# Patient Record
Sex: MALE | Employment: OTHER | URBAN - METROPOLITAN AREA
[De-identification: names, ages, dates, MRNs, and addresses within clinical notes are randomized per-mention and may not be internally consistent; named-entity substitution may affect disease eponyms.]

---

## 2017-01-04 ENCOUNTER — APPOINTMENT (OUTPATIENT)
Dept: LAB | Age: 82
End: 2017-01-04
Attending: INTERNAL MEDICINE
Payer: MEDICARE

## 2017-01-04 ENCOUNTER — OFFICE VISIT (OUTPATIENT)
Dept: NEPHROLOGY | Facility: CLINIC | Age: 82
End: 2017-01-04

## 2017-01-04 VITALS — BODY MASS INDEX: 35 KG/M2 | DIASTOLIC BLOOD PRESSURE: 72 MMHG | SYSTOLIC BLOOD PRESSURE: 144 MMHG | WEIGHT: 230 LBS

## 2017-01-04 DIAGNOSIS — N18.5 CKD (CHRONIC KIDNEY DISEASE), STAGE 5: ICD-10-CM

## 2017-01-04 DIAGNOSIS — N18.5 CKD (CHRONIC KIDNEY DISEASE), STAGE 5: Primary | ICD-10-CM

## 2017-01-04 LAB — HBV SURFACE AG SERPL QL IA: NONREACTIVE

## 2017-01-04 PROCEDURE — 36415 COLL VENOUS BLD VENIPUNCTURE: CPT

## 2017-01-04 PROCEDURE — 87340 HEPATITIS B SURFACE AG IA: CPT

## 2017-01-04 PROCEDURE — 99214 OFFICE O/P EST MOD 30 MIN: CPT | Performed by: INTERNAL MEDICINE

## 2017-01-04 RX ORDER — ACETAMINOPHEN 325 MG/1
650 TABLET ORAL EVERY 6 HOURS PRN
COMMUNITY
End: 2019-07-31 | Stop reason: ALTCHOICE

## 2017-01-04 NOTE — PROGRESS NOTES
Nephrology Progress Note      Sheridan Centeno is a 80year old male.     HPI:   Patient presents with:  Chronic Kidney Disease  Hypertension      Mr. Tracey Eason was seen in the nephrology clinic today in follow-up for management of progressive chronic kidne SUBSTANCE, EPIDURAL/SUBARACHNOID; LUMBAR/SACRAL N/A 4/17/2015    Comment Procedure: CAUDAL;  Surgeon: Alex Licea MD;  Location: [address] [place] [address] BY [facility] [address] [facility] 59  [facility] 5+ YR N/A 4/17/2015    Comment Procedure: CAUDAL;  Surge Medications (Active prior to today's visit):    Current Outpatient Prescriptions:  HYDROcodone-acetaminophen 5-325 MG Oral Tab Take 1 tablet by mouth every 4 (four) hours as needed for Pain.  Disp: 30 tablet Rfl: 0   TRAMADOL HCL 50 MG Oral Tab TAKE ONE scleral icterus, MMM  Neck: Supple, no SAY or thyromegaly  Cardiac: Regular rate and rhythm, S1, S2 normal, no murmur or rub  Lungs: Clear without wheezes, rales, rhonchi.     Abdomen: Soft, non-tender. + bowel sounds, no palpable organomegaly  Extremities: At this point he has developed symptoms related to worsening kidney function and would appear to have symptomatic uremia.   Although he is not in need of dialysis acutely at this time we had a long discussion regarding the fact that he should initiate dial

## 2017-01-11 ENCOUNTER — TELEPHONE (OUTPATIENT)
Dept: NEPHROLOGY | Facility: CLINIC | Age: 82
End: 2017-01-11

## 2017-01-15 ENCOUNTER — ANESTHESIA EVENT (OUTPATIENT)
Dept: CARDIAC SURGERY | Facility: HOSPITAL | Age: 82
End: 2017-01-15
Payer: MEDICARE

## 2017-01-16 ENCOUNTER — ANESTHESIA (OUTPATIENT)
Dept: CARDIAC SURGERY | Facility: HOSPITAL | Age: 82
End: 2017-01-16
Payer: MEDICARE

## 2017-01-16 NOTE — ANESTHESIA PREPROCEDURE EVALUATION
PRE-OP EVALUATION    Patient Name: Perla Calvert    Pre-op Diagnosis: end stage renal disease    Procedure(s):  revision of left arm AV fistula  no SA per surgeon    Surgeon(s) and Role:     Mary Alice Christensen MD - Primary    Pre-op vitals reviewed. reviewed.           (+) obesity  (+) hypertension   (+) hyperlipidemia                                  Endo/Other      (+) diabetes  type 2,                          Pulmonary                           Neuro/Psych                 Neuromuscular disease: lum EVENTS N/A 4/17/2015    Comment Procedure: CAUDAL;  Surgeon: Sathya Barnes MD;  Location: [facility] FOR PAIN MANAGEMENT    INJECTION, W/WO CONTRAST, DX/THERAPEUTIC SUBSTANCE, EPIDURAL/SUBARACHNOID; LUMBAR/SACRAL N/A 5/5/2015    Comment Procedure: CAUD Results  Component Value Date   INR 1.0 01/11/2017         Airway      Mallampati: II  Mouth opening: >3 FB  TM distance: 4 - 6 cm  Neck ROM: full Cardiovascular    Cardiovascular exam normal.         Dental      Dental appliance(s): upper dentures       P

## 2017-01-23 ENCOUNTER — SURGERY (OUTPATIENT)
Age: 82
End: 2017-01-23

## 2017-01-23 ENCOUNTER — HOSPITAL ENCOUNTER (OUTPATIENT)
Facility: HOSPITAL | Age: 82
Setting detail: OBSERVATION
LOS: 1 days | Discharge: HOME | End: 2017-01-23
Attending: SURGERY | Admitting: SURGERY
Payer: MEDICARE

## 2017-01-23 ENCOUNTER — APPOINTMENT (OUTPATIENT)
Dept: GENERAL RADIOLOGY | Facility: HOSPITAL | Age: 82
End: 2017-01-23
Attending: SURGERY
Payer: MEDICARE

## 2017-01-23 VITALS
RESPIRATION RATE: 16 BRPM | DIASTOLIC BLOOD PRESSURE: 65 MMHG | OXYGEN SATURATION: 96 % | HEART RATE: 68 BPM | SYSTOLIC BLOOD PRESSURE: 139 MMHG | WEIGHT: 217.19 LBS | TEMPERATURE: 98 F | HEIGHT: 68 IN | BODY MASS INDEX: 32.92 KG/M2

## 2017-01-23 LAB
ATRIAL RATE: 72 BPM
BUN BLD-MCNC: 44 MG/DL (ref 8–20)
CALCIUM BLD-MCNC: 8.1 MG/DL (ref 8.3–10.3)
CHLORIDE: 113 MMOL/L (ref 101–111)
CO2: 21 MMOL/L (ref 22–32)
CREAT BLD-MCNC: 5.51 MG/DL (ref 0.7–1.3)
GLUCOSE BLD-MCNC: 92 MG/DL (ref 70–99)
GLUCOSE BLD-MCNC: 95 MG/DL (ref 65–99)
P AXIS: 45 DEGREES
P-R INTERVAL: 172 MS
POTASSIUM SERPL-SCNC: 4.3 MMOL/L (ref 3.6–5.1)
Q-T INTERVAL: 438 MS
QRS DURATION: 138 MS
QTC CALCULATION (BEZET): 479 MS
R AXIS: 16 DEGREES
SODIUM SERPL-SCNC: 143 MMOL/L (ref 136–144)
T AXIS: 24 DEGREES
VENTRICULAR RATE: 72 BPM

## 2017-01-23 PROCEDURE — 71010 XR CHEST AP PORTABLE  (CPT=71010): CPT

## 2017-01-23 PROCEDURE — 05WY07Z REVISION OF AUTOLOGOUS TISSUE SUBSTITUTE IN UPPER VEIN, OPEN APPROACH: ICD-10-PCS | Performed by: SURGERY

## 2017-01-23 PROCEDURE — 80048 BASIC METABOLIC PNL TOTAL CA: CPT | Performed by: SURGERY

## 2017-01-23 PROCEDURE — 93010 ELECTROCARDIOGRAM REPORT: CPT | Performed by: INTERNAL MEDICINE

## 2017-01-23 PROCEDURE — 93005 ELECTROCARDIOGRAM TRACING: CPT

## 2017-01-23 PROCEDURE — 82962 GLUCOSE BLOOD TEST: CPT

## 2017-01-23 RX ORDER — NALOXONE HYDROCHLORIDE 0.4 MG/ML
80 INJECTION, SOLUTION INTRAMUSCULAR; INTRAVENOUS; SUBCUTANEOUS AS NEEDED
Status: DISCONTINUED | OUTPATIENT
Start: 2017-01-23 | End: 2017-01-23

## 2017-01-23 RX ORDER — LABETALOL HYDROCHLORIDE 5 MG/ML
5 INJECTION, SOLUTION INTRAVENOUS EVERY 5 MIN PRN
Status: DISCONTINUED | OUTPATIENT
Start: 2017-01-23 | End: 2017-01-23

## 2017-01-23 RX ORDER — HYDROCODONE BITARTRATE AND ACETAMINOPHEN 5; 325 MG/1; MG/1
1 TABLET ORAL EVERY 4 HOURS PRN
Qty: 30 TABLET | Refills: 0 | Status: SHIPPED | OUTPATIENT
Start: 2017-01-23 | End: 2017-04-05

## 2017-01-23 RX ORDER — HYDROCODONE BITARTRATE AND ACETAMINOPHEN 5; 325 MG/1; MG/1
2 TABLET ORAL AS NEEDED
Status: DISCONTINUED | OUTPATIENT
Start: 2017-01-23 | End: 2017-01-23

## 2017-01-23 RX ORDER — HYDROCODONE BITARTRATE AND ACETAMINOPHEN 5; 325 MG/1; MG/1
1 TABLET ORAL EVERY 4 HOURS PRN
Status: DISCONTINUED | OUTPATIENT
Start: 2017-01-23 | End: 2017-01-23

## 2017-01-23 RX ORDER — SODIUM CHLORIDE, SODIUM LACTATE, POTASSIUM CHLORIDE, CALCIUM CHLORIDE 600; 310; 30; 20 MG/100ML; MG/100ML; MG/100ML; MG/100ML
INJECTION, SOLUTION INTRAVENOUS CONTINUOUS
Status: DISCONTINUED | OUTPATIENT
Start: 2017-01-23 | End: 2017-01-23

## 2017-01-23 RX ORDER — HYDROMORPHONE HYDROCHLORIDE 1 MG/ML
0.4 INJECTION, SOLUTION INTRAMUSCULAR; INTRAVENOUS; SUBCUTANEOUS EVERY 5 MIN PRN
Status: DISCONTINUED | OUTPATIENT
Start: 2017-01-23 | End: 2017-01-23

## 2017-01-23 RX ORDER — CEFAZOLIN SODIUM 1 G/3ML
INJECTION, POWDER, FOR SOLUTION INTRAMUSCULAR; INTRAVENOUS
Status: DISCONTINUED | OUTPATIENT
Start: 2017-01-23 | End: 2017-01-23

## 2017-01-23 RX ORDER — DEXTROSE MONOHYDRATE 25 G/50ML
50 INJECTION, SOLUTION INTRAVENOUS
Status: DISCONTINUED | OUTPATIENT
Start: 2017-01-23 | End: 2017-01-23

## 2017-01-23 RX ORDER — BUPIVACAINE HYDROCHLORIDE 5 MG/ML
INJECTION, SOLUTION PERINEURAL AS NEEDED
Status: DISCONTINUED | OUTPATIENT
Start: 2017-01-23 | End: 2017-01-23 | Stop reason: HOSPADM

## 2017-01-23 RX ORDER — HYDROCODONE BITARTRATE AND ACETAMINOPHEN 5; 325 MG/1; MG/1
2 TABLET ORAL EVERY 4 HOURS PRN
Status: DISCONTINUED | OUTPATIENT
Start: 2017-01-23 | End: 2017-01-23

## 2017-01-23 RX ORDER — HYDROCODONE BITARTRATE AND ACETAMINOPHEN 5; 325 MG/1; MG/1
1 TABLET ORAL AS NEEDED
Status: DISCONTINUED | OUTPATIENT
Start: 2017-01-23 | End: 2017-01-23

## 2017-01-23 NOTE — ANESTHESIA POSTPROCEDURE EVALUATION
[facility] Patient Status:  Observation   Age/Gender 80year old male MRN [id number]   Location [address] Attending Nick Lange MD   Hosp Day # 0 PCP Sam Chan MD       Anesthesia Post-op Not

## 2017-01-23 NOTE — OPERATIVE REPORT
[facility] POST ANESTHESIA CARE UNIT  Brief Op Note     Orlean Edge Location: OR   [facility] [id number] MRN [id number]   Admission Date 1/23/2017 Operation Date 1/23/2017   Attending Physician Filomena Gonzalez MD Operating Physician Sergio De Oliveira MD

## 2017-01-23 NOTE — BRIEF OP NOTE
[address] [place] POST ANESTHESIA CARE UNIT  Brief Op Note     Cindi Payal Location: OR   [facility] [id number] MRN [id number]   Admission Date 1/23/2017 Operation Date 1/23/2017   Attending Physician Otilio Meigs, MD Operating Physician Mark Tai MD

## 2017-01-23 NOTE — PROGRESS NOTES
[facility]  Vascular Surgery    Junior Thomas Patient Status:  Inpatient    1935 MRN [id number]   [facility] Attending Javan Rojas MD   [facility] Day # 0 PCP Kaushik Romo MD         History of Present Il Stella Sibley MD;  Location: [facility] FOR PAIN MANAGEMENT    PATIENT [address] FOR IV ANTIBIOTIC SURGICAL SITE INFECTION PROPHYLAXIS.  N/A 4/17/2015    Comment Procedure: CAUDAL;  Surgeon: Stella Sibley MD;  Location: [address] [place] [address] facility-administered medications for this encounter.     Review of Systems:    CONSTITUTIONAL: denies fever, chills  ENT: denies sore throat, nasal drainage/congestion  CHEST/CVS: denies cough, sputum, trouble breathing/SOB, chest pain, CORBIN  GI: denies abd MD  1/23/2017  6:49 AM

## 2017-02-08 ENCOUNTER — TELEPHONE (OUTPATIENT)
Dept: NEPHROLOGY | Facility: CLINIC | Age: 82
End: 2017-02-08

## 2017-02-08 NOTE — TELEPHONE ENCOUNTER
I spoke with pt today, AVF ready to use. C/o ongoing wt loss/lack of appetite/fatigue. Plan to initiate HD at [address].   Paperwork is being done

## 2017-03-20 RX ORDER — AMLODIPINE BESYLATE 10 MG/1
TABLET ORAL
Qty: 30 TABLET | Refills: 5 | OUTPATIENT
Start: 2017-03-20

## 2017-03-27 ENCOUNTER — ANESTHESIA EVENT (OUTPATIENT)
Dept: CARDIAC SURGERY | Facility: HOSPITAL | Age: 82
End: 2017-03-27
Payer: MEDICARE

## 2017-03-27 ENCOUNTER — HOSPITAL ENCOUNTER (OUTPATIENT)
Facility: HOSPITAL | Age: 82
Setting detail: HOSPITAL OUTPATIENT SURGERY
Discharge: HOME | End: 2017-03-27
Attending: SURGERY | Admitting: SURGERY
Payer: MEDICARE

## 2017-03-27 ENCOUNTER — SURGERY (OUTPATIENT)
Age: 82
End: 2017-03-27

## 2017-03-27 ENCOUNTER — ANESTHESIA (OUTPATIENT)
Dept: CARDIAC SURGERY | Facility: HOSPITAL | Age: 82
End: 2017-03-27
Payer: MEDICARE

## 2017-03-27 VITALS
OXYGEN SATURATION: 99 % | HEART RATE: 59 BPM | TEMPERATURE: 97 F | DIASTOLIC BLOOD PRESSURE: 66 MMHG | BODY MASS INDEX: 31.57 KG/M2 | RESPIRATION RATE: 14 BRPM | SYSTOLIC BLOOD PRESSURE: 136 MMHG | WEIGHT: 208.31 LBS | HEIGHT: 68 IN

## 2017-03-27 LAB
ALBUMIN SERPL-MCNC: 2.6 G/DL (ref 3.5–4.8)
ALP LIVER SERPL-CCNC: 94 U/L (ref 45–117)
ALT SERPL-CCNC: 11 U/L (ref 17–63)
AST SERPL-CCNC: 15 U/L (ref 15–41)
ATRIAL RATE: 68 BPM
BASOPHILS # BLD AUTO: 0.04 X10(3) UL (ref 0–0.1)
BASOPHILS NFR BLD AUTO: 0.5 %
BILIRUB SERPL-MCNC: 0.4 MG/DL (ref 0.1–2)
BUN BLD-MCNC: 30 MG/DL (ref 8–20)
CALCIUM BLD-MCNC: 8.5 MG/DL (ref 8.3–10.3)
CHLORIDE: 105 MMOL/L (ref 101–111)
CO2: 30 MMOL/L (ref 22–32)
CREAT BLD-MCNC: 4.99 MG/DL (ref 0.7–1.3)
EOSINOPHIL # BLD AUTO: 0.17 X10(3) UL (ref 0–0.3)
EOSINOPHIL NFR BLD AUTO: 2.1 %
ERYTHROCYTE [DISTWIDTH] IN BLOOD BY AUTOMATED COUNT: 14.9 % (ref 11.5–16)
GLUCOSE BLD-MCNC: 101 MG/DL (ref 70–99)
GLUCOSE BLD-MCNC: 93 MG/DL (ref 65–99)
GLUCOSE BLD-MCNC: 98 MG/DL (ref 65–99)
HCT VFR BLD AUTO: 34.9 % (ref 37–53)
HGB BLD-MCNC: 11.1 G/DL (ref 13–17)
IMMATURE GRANULOCYTE COUNT: 0.03 X10(3) UL (ref 0–1)
IMMATURE GRANULOCYTE RATIO %: 0.4 %
LYMPHOCYTES # BLD AUTO: 1.84 X10(3) UL (ref 0.9–4)
LYMPHOCYTES NFR BLD AUTO: 22.7 %
M PROTEIN MFR SERPL ELPH: 5.9 G/DL (ref 6.1–8.3)
MCH RBC QN AUTO: 33.2 PG (ref 27–33.2)
MCHC RBC AUTO-ENTMCNC: 31.8 G/DL (ref 31–37)
MCV RBC AUTO: 104.5 FL (ref 80–99)
MONOCYTES # BLD AUTO: 0.63 X10(3) UL (ref 0.1–0.6)
MONOCYTES NFR BLD AUTO: 7.8 %
NEUTROPHIL ABS PRELIM: 5.4 X10 (3) UL (ref 1.3–6.7)
NEUTROPHILS # BLD AUTO: 5.4 X10(3) UL (ref 1.3–6.7)
NEUTROPHILS NFR BLD AUTO: 66.5 %
P AXIS: 49 DEGREES
P-R INTERVAL: 170 MS
PLATELET # BLD AUTO: 177 10(3)UL (ref 150–450)
POTASSIUM SERPL-SCNC: 4.8 MMOL/L (ref 3.6–5.1)
Q-T INTERVAL: 458 MS
QRS DURATION: 132 MS
QTC CALCULATION (BEZET): 487 MS
R AXIS: 7 DEGREES
RBC # BLD AUTO: 3.34 X10(6)UL (ref 3.8–5.8)
RED CELL DISTRIBUTION WIDTH-SD: 57.1 FL (ref 35.1–46.3)
SODIUM SERPL-SCNC: 142 MMOL/L (ref 136–144)
T AXIS: 14 DEGREES
VENTRICULAR RATE: 68 BPM
WBC # BLD AUTO: 8.1 X10(3) UL (ref 4–13)

## 2017-03-27 PROCEDURE — 05WY07Z REVISION OF AUTOLOGOUS TISSUE SUBSTITUTE IN UPPER VEIN, OPEN APPROACH: ICD-10-PCS | Performed by: SURGERY

## 2017-03-27 PROCEDURE — 80053 COMPREHEN METABOLIC PANEL: CPT | Performed by: SURGERY

## 2017-03-27 PROCEDURE — 82962 GLUCOSE BLOOD TEST: CPT

## 2017-03-27 PROCEDURE — 93010 ELECTROCARDIOGRAM REPORT: CPT | Performed by: INTERNAL MEDICINE

## 2017-03-27 PROCEDURE — 85025 COMPLETE CBC W/AUTO DIFF WBC: CPT | Performed by: SURGERY

## 2017-03-27 PROCEDURE — 93005 ELECTROCARDIOGRAM TRACING: CPT

## 2017-03-27 RX ORDER — DEXTROSE MONOHYDRATE 25 G/50ML
50 INJECTION, SOLUTION INTRAVENOUS
Status: DISCONTINUED | OUTPATIENT
Start: 2017-03-27 | End: 2017-03-27

## 2017-03-27 RX ORDER — HYDROCODONE BITARTRATE AND ACETAMINOPHEN 5; 325 MG/1; MG/1
1 TABLET ORAL EVERY 4 HOURS PRN
Qty: 30 TABLET | Refills: 0 | Status: SHIPPED | OUTPATIENT
Start: 2017-03-27 | End: 2017-04-05

## 2017-03-27 RX ORDER — HYDROMORPHONE HYDROCHLORIDE 1 MG/ML
INJECTION, SOLUTION INTRAMUSCULAR; INTRAVENOUS; SUBCUTANEOUS
Status: COMPLETED
Start: 2017-03-27 | End: 2017-03-27

## 2017-03-27 RX ORDER — HYDROCODONE BITARTRATE AND ACETAMINOPHEN 5; 325 MG/1; MG/1
1 TABLET ORAL AS NEEDED
Status: DISCONTINUED | OUTPATIENT
Start: 2017-03-27 | End: 2017-03-27

## 2017-03-27 RX ORDER — HYDROCODONE BITARTRATE AND ACETAMINOPHEN 5; 325 MG/1; MG/1
2 TABLET ORAL AS NEEDED
Status: DISCONTINUED | OUTPATIENT
Start: 2017-03-27 | End: 2017-03-27

## 2017-03-27 RX ORDER — NALOXONE HYDROCHLORIDE 0.4 MG/ML
80 INJECTION, SOLUTION INTRAMUSCULAR; INTRAVENOUS; SUBCUTANEOUS AS NEEDED
Status: DISCONTINUED | OUTPATIENT
Start: 2017-03-27 | End: 2017-03-27

## 2017-03-27 RX ORDER — SODIUM CHLORIDE, SODIUM LACTATE, POTASSIUM CHLORIDE, CALCIUM CHLORIDE 600; 310; 30; 20 MG/100ML; MG/100ML; MG/100ML; MG/100ML
INJECTION, SOLUTION INTRAVENOUS CONTINUOUS
Status: DISCONTINUED | OUTPATIENT
Start: 2017-03-27 | End: 2017-03-27

## 2017-03-27 RX ORDER — ONDANSETRON 2 MG/ML
4 INJECTION INTRAMUSCULAR; INTRAVENOUS AS NEEDED
Status: DISCONTINUED | OUTPATIENT
Start: 2017-03-27 | End: 2017-03-27

## 2017-03-27 RX ORDER — MEPERIDINE HYDROCHLORIDE 25 MG/ML
12.5 INJECTION INTRAMUSCULAR; INTRAVENOUS; SUBCUTANEOUS AS NEEDED
Status: DISCONTINUED | OUTPATIENT
Start: 2017-03-27 | End: 2017-03-27

## 2017-03-27 RX ORDER — MIDAZOLAM HYDROCHLORIDE 1 MG/ML
1 INJECTION INTRAMUSCULAR; INTRAVENOUS EVERY 5 MIN PRN
Status: DISCONTINUED | OUTPATIENT
Start: 2017-03-27 | End: 2017-03-27

## 2017-03-27 RX ORDER — CLINDAMYCIN PHOSPHATE 900 MG/50ML
INJECTION INTRAVENOUS
Status: DISCONTINUED | OUTPATIENT
Start: 2017-03-27 | End: 2017-03-27

## 2017-03-27 RX ORDER — BUPIVACAINE HYDROCHLORIDE 5 MG/ML
INJECTION, SOLUTION PERINEURAL AS NEEDED
Status: DISCONTINUED | OUTPATIENT
Start: 2017-03-27 | End: 2017-03-27 | Stop reason: HOSPADM

## 2017-03-27 RX ORDER — HYDROMORPHONE HYDROCHLORIDE 1 MG/ML
0.4 INJECTION, SOLUTION INTRAMUSCULAR; INTRAVENOUS; SUBCUTANEOUS EVERY 5 MIN PRN
Status: DISCONTINUED | OUTPATIENT
Start: 2017-03-27 | End: 2017-03-27

## 2017-03-27 NOTE — ANESTHESIA PREPROCEDURE EVALUATION
PRE-OP EVALUATION    Patient Name: Davidson Sandra    Pre-op Diagnosis: end stage renal     Procedure(s):  revision of left arteriovenous fistula   NO SA PER DR BANDA    Surgeon(s) and Role:     Jed Crow MD - Primary    Pre-op vitals reviewed. noted on gated SPECT analysis. - Normal ejection fraction. Prepared and signed by  Amita Centeno M.D.  11/09/2016 15:30      ECG reviewed.           (+) obesity  (+) hypertension   (+) hyperlipidemia                                  Endo/Other      (+) d Lior Jones MD;  Location: [facility] FOR PAIN MANAGEMENT    PATIENT DOCUMENTED NOT TO HAVE EXPERIENCED ANY OF THE FOLLOWING EVENTS N/A 4/17/2015    Comment Procedure: CAUDAL;  Surgeon: Lior Jones MD;  Location: [address] [place] [address] Results  Component Value Date    03/27/2017    01/11/2017   K 4.8 03/27/2017   K 5.1 01/11/2017    03/27/2017    01/11/2017   CO2 30.0 03/27/2017   CO2 22.0 01/11/2017   BUN 30* 03/27/2017   BUN 38* 01/11/2017   CREATSERUM 4.99* 03/

## 2017-03-27 NOTE — OPERATIVE REPORT
[facility] POST ANESTHESIA CARE UNIT  Brief Op Note     Tamisonia Malodnado Location: OR   [facility] [id number] MRN [id number]   Admission Date 3/27/2017 Operation Date 3/27/2017   Attending Physician Emily Antoine MD Operating Physician Emma Telles MD was Doppler interrogated to confirm no significant change. Vein branch was ligated. This was repeated a third time at the level of the deltopectoral groove. All incisions were then irrigated and closed with interrupted Vicryl and Monocryl.   Areas for Lg Das

## 2017-03-27 NOTE — H&P
Patient seen and marked for revision of fistula. Left arm with branches diverting flow.  I have discussed with the patient and/or legal representative the potential benefits, risks, and side effects of the procedure; the likelihood of the patient achieving

## 2017-03-27 NOTE — BRIEF OP NOTE
[address] [place] POST ANESTHESIA CARE UNIT  Brief Op Note     Terris Ban Location: OR   [facility] [id number] MRN [id number]   Admission Date 3/27/2017 Operation Date 3/27/2017   Attending Physician Sreekanth Gilmore MD Operating Physician Miriam Carter MD

## 2017-03-27 NOTE — ANESTHESIA POSTPROCEDURE EVALUATION
[facility] [place] Patient Status:  Inpatient   Age/Gender 80year old male MRN [id number]   Location [address] [facility] Attending Nina Villanueva MD   [facility] Day # 0 PCP Pascual Reno MD       Anesthesia Post-op Note

## 2017-03-29 NOTE — PAYOR COMM NOTE
Review Type: ADMISSION   Reviewer: Tamiko Laguerre       Date: March 29, 2017 - 10:47 AM  Payor: [address] [place] [address] Number: [id number]  Admit date: 3/27/2017  5:24 AM   Admitted from Emergency Dept.:     REVIEWER COMMENTS    Shyanne Newman

## 2017-04-05 PROBLEM — I15.0 RENOVASCULAR HYPERTENSION: Status: ACTIVE | Noted: 2017-04-05

## 2017-05-17 PROBLEM — E11.29 MICROALBUMINURIA DUE TO TYPE 2 DIABETES MELLITUS (HCC): Status: ACTIVE | Noted: 2017-05-17

## 2017-05-17 PROBLEM — N18.6 DIABETES MELLITUS WITH ESRD (END-STAGE RENAL DISEASE) (HCC): Status: ACTIVE | Noted: 2017-05-17

## 2017-05-17 PROBLEM — E11.42 DM TYPE 2 WITH DIABETIC PERIPHERAL NEUROPATHY (HCC): Status: ACTIVE | Noted: 2017-05-17

## 2017-05-17 PROBLEM — R80.9 MICROALBUMINURIA DUE TO TYPE 2 DIABETES MELLITUS: Status: ACTIVE | Noted: 2017-05-17

## 2017-05-17 PROBLEM — E11.8 TYPE 2 DIABETES MELLITUS WITH COMPLICATION, WITHOUT LONG-TERM CURRENT USE OF INSULIN (HCC): Status: ACTIVE | Noted: 2017-05-17

## 2017-05-17 PROBLEM — E11.22 DIABETES MELLITUS WITH ESRD (END-STAGE RENAL DISEASE) (HCC): Status: ACTIVE | Noted: 2017-05-17

## 2017-05-17 PROBLEM — R80.9 MICROALBUMINURIA DUE TO TYPE 2 DIABETES MELLITUS (HCC): Status: ACTIVE | Noted: 2017-05-17

## 2017-05-17 PROBLEM — E11.29 MICROALBUMINURIA DUE TO TYPE 2 DIABETES MELLITUS: Status: ACTIVE | Noted: 2017-05-17

## 2017-05-17 PROBLEM — Z86.39 HX OF DIABETIC RETINOPATHY: Status: ACTIVE | Noted: 2017-05-17

## 2017-05-19 PROCEDURE — 82570 ASSAY OF URINE CREATININE: CPT | Performed by: INTERNAL MEDICINE

## 2017-05-19 PROCEDURE — 82043 UR ALBUMIN QUANTITATIVE: CPT | Performed by: INTERNAL MEDICINE

## 2017-06-29 PROBLEM — N18.6 END STAGE RENAL DISEASE (HCC): Status: ACTIVE | Noted: 2017-06-29

## 2017-08-16 PROBLEM — E11.3293 MILD NONPROLIFERATIVE DIABETIC RETINOPATHY OF BOTH EYES WITHOUT MACULAR EDEMA ASSOCIATED WITH TYPE 2 DIABETES MELLITUS (HCC): Status: ACTIVE | Noted: 2017-08-16

## 2017-08-16 PROBLEM — Z86.39 HX OF DIABETIC RETINOPATHY: Status: RESOLVED | Noted: 2017-05-17 | Resolved: 2017-08-16

## 2018-04-11 PROBLEM — M65.331 TRIGGER MIDDLE FINGER OF RIGHT HAND: Status: ACTIVE | Noted: 2018-04-11

## 2018-05-02 PROBLEM — R80.9 MICROALBUMINURIA DUE TO TYPE 2 DIABETES MELLITUS (HCC): Status: RESOLVED | Noted: 2017-05-17 | Resolved: 2018-05-02

## 2018-05-02 PROBLEM — E11.29 MICROALBUMINURIA DUE TO TYPE 2 DIABETES MELLITUS: Status: RESOLVED | Noted: 2017-05-17 | Resolved: 2018-05-02

## 2018-05-02 PROBLEM — R80.9 MICROALBUMINURIA DUE TO TYPE 2 DIABETES MELLITUS: Status: RESOLVED | Noted: 2017-05-17 | Resolved: 2018-05-02

## 2018-05-02 PROBLEM — M65.331 TRIGGER MIDDLE FINGER OF RIGHT HAND: Status: RESOLVED | Noted: 2018-04-11 | Resolved: 2018-05-02

## 2018-05-02 PROBLEM — E11.29 MICROALBUMINURIA DUE TO TYPE 2 DIABETES MELLITUS (HCC): Status: RESOLVED | Noted: 2017-05-17 | Resolved: 2018-05-02

## 2018-08-01 ENCOUNTER — OFFICE VISIT (OUTPATIENT)
Dept: PODIATRY CLINIC | Facility: CLINIC | Age: 83
End: 2018-08-01
Payer: MEDICARE

## 2018-08-01 DIAGNOSIS — E11.3293 MILD NONPROLIFERATIVE DIABETIC RETINOPATHY OF BOTH EYES WITHOUT MACULAR EDEMA ASSOCIATED WITH TYPE 2 DIABETES MELLITUS (HCC): ICD-10-CM

## 2018-08-01 DIAGNOSIS — E11.42 DM TYPE 2 WITH DIABETIC PERIPHERAL NEUROPATHY (HCC): Primary | ICD-10-CM

## 2018-08-01 DIAGNOSIS — B35.1 ONYCHOMYCOSIS: ICD-10-CM

## 2018-08-01 DIAGNOSIS — L84 TYLOMA: ICD-10-CM

## 2018-08-01 DIAGNOSIS — M20.12 VALGUS DEFORMITY OF BOTH GREAT TOES: ICD-10-CM

## 2018-08-01 DIAGNOSIS — M48.062 SPINAL STENOSIS OF LUMBAR REGION WITH NEUROGENIC CLAUDICATION: ICD-10-CM

## 2018-08-01 DIAGNOSIS — M20.11 VALGUS DEFORMITY OF BOTH GREAT TOES: ICD-10-CM

## 2018-08-01 PROCEDURE — 11721 DEBRIDE NAIL 6 OR MORE: CPT | Performed by: PODIATRIST

## 2018-08-01 PROCEDURE — 99213 OFFICE O/P EST LOW 20 MIN: CPT | Performed by: PODIATRIST

## 2018-08-01 RX ORDER — CHOLECALCIFEROL (VITAMIN D3) 50 MCG
2000 CAPSULE ORAL DAILY
Refills: 6 | COMMUNITY
Start: 2018-06-28

## 2018-08-01 RX ORDER — ROPINIROLE 0.25 MG/1
TABLET, FILM COATED ORAL
Refills: 6 | COMMUNITY
Start: 2018-06-13 | End: 2019-04-19

## 2018-08-01 NOTE — PROGRESS NOTES
Jemma Rollins is a 82year old male. Patient presents with:  Diabetic Foot Care: Saw Dr. Gil Louis 05/02/18. Last A1c was 5.1 on 04/18/18. BG was 110 this AM. Denies any pain, numbness or tingling in feet today.          HPI:   This pleasant gentleman retur [employer]    • DM kidney disease ([facility] [address]    • GOUT    • HYPERLIPIDEMIA    • HYPERTENSION    • Osteoarthritis       Past Surgical History:  No date: BACK SURGERY      Comment: lumbar fusion  No date: CATARACT      Comment: left eye  5/5/2015: PATIENT Homer Risa PREOPERATIVE ORDER FOR IV ANT* N/A      Comment: Procedure: CAUDAL;  Surgeon: Stella Sibley MD;  Location: [address] [place] [address] MANAGEMENT  5/5/2015: PATIENT [facility] PREOPERATIVE ORDER FOR IV ANT* N/A      Comment: Proced headaches    EXAM:   There were no vitals taken for this visit. Physical Exam  GENERAL: well developed, well nourished, in no apparent distress  EXTREMITIES:   1.  Integument: The skin was examined on both feet is warm and dry his toenails 1-5 or significa

## 2018-09-17 ENCOUNTER — HOSPITAL ENCOUNTER (OUTPATIENT)
Dept: INTERVENTIONAL RADIOLOGY/VASCULAR | Facility: HOSPITAL | Age: 83
Discharge: HOME | End: 2018-09-17
Attending: SURGERY | Admitting: SURGERY
Payer: MEDICARE

## 2018-09-17 VITALS
SYSTOLIC BLOOD PRESSURE: 176 MMHG | DIASTOLIC BLOOD PRESSURE: 66 MMHG | RESPIRATION RATE: 20 BRPM | OXYGEN SATURATION: 99 % | TEMPERATURE: 98 F | HEART RATE: 62 BPM

## 2018-09-17 DIAGNOSIS — N18.6 ESRD (END STAGE RENAL DISEASE) (HCC): ICD-10-CM

## 2018-09-17 LAB — GLUCOSE BLD-MCNC: 91 MG/DL (ref 65–99)

## 2018-09-17 PROCEDURE — 99153 MOD SED SAME PHYS/QHP EA: CPT

## 2018-09-17 PROCEDURE — 82962 GLUCOSE BLOOD TEST: CPT

## 2018-09-17 PROCEDURE — 36902 INTRO CATH DIALYSIS CIRCUIT: CPT

## 2018-09-17 PROCEDURE — 057F3ZZ DILATION OF LEFT CEPHALIC VEIN, PERCUTANEOUS APPROACH: ICD-10-PCS | Performed by: SURGERY

## 2018-09-17 PROCEDURE — 99152 MOD SED SAME PHYS/QHP 5/>YRS: CPT

## 2018-09-17 RX ORDER — MIDAZOLAM HYDROCHLORIDE 1 MG/ML
INJECTION INTRAMUSCULAR; INTRAVENOUS
Status: COMPLETED
Start: 2018-09-17 | End: 2018-09-17

## 2018-09-17 RX ORDER — LIDOCAINE HYDROCHLORIDE 10 MG/ML
INJECTION, SOLUTION EPIDURAL; INFILTRATION; INTRACAUDAL; PERINEURAL
Status: COMPLETED
Start: 2018-09-17 | End: 2018-09-17

## 2018-09-17 RX ORDER — HEPARIN SODIUM 5000 [USP'U]/ML
INJECTION, SOLUTION INTRAVENOUS; SUBCUTANEOUS
Status: COMPLETED
Start: 2018-09-17 | End: 2018-09-17

## 2018-09-17 NOTE — BRIEF OP NOTE
Pre-Operative Diagnosis: ESRD     Post-Operative Diagnosis: same     Procedure Performed:   1. Fistulagram   2.  Balloon angioplasty of left cephalic vein for 40 percent stenosis with 8 mm and 10 mm balloon    Anesthesia: monitored conscious sedation 2 mg v

## 2018-09-17 NOTE — PROCEDURES
[facility]    PATIENT'S NAME: ST Tom BORREGO   ATTENDING PHYSICIAN: Devin Gaines M.D. OPERATING PHYSICIAN: Devin Gaines M.D.    PATIENT ACCOUNT#:   [de-identified]    LOCATION:  [place] [address] ED  MEDICAL RECORD #:   [id number] mm balloon with an acceptable result. I then turned my attention and evaluated the central veins by injecting contrast through sheath. There was no evidence of a central vein stenosis. Satisfied with the result, we then removed all devices.     Dictated

## 2018-09-17 NOTE — PROGRESS NOTES
Rc'd pt from IR in stable condition. VSS. Stitch with dressing to left arm is sift, clean and dry. No bleeding or hematoma. Good bruit and thrill. Pt denies c/o pain or discomfort. Dr Lona Martell at bedside. 12:00: Dressing stable. No c/o.  IV dc'd with ca

## 2018-09-26 ENCOUNTER — OFFICE VISIT (OUTPATIENT)
Dept: PODIATRY CLINIC | Facility: CLINIC | Age: 83
End: 2018-09-26
Payer: MEDICARE

## 2018-09-26 DIAGNOSIS — B35.1 ONYCHOMYCOSIS: ICD-10-CM

## 2018-09-26 DIAGNOSIS — L84 TYLOMA: ICD-10-CM

## 2018-09-26 DIAGNOSIS — M20.11 VALGUS DEFORMITY OF BOTH GREAT TOES: ICD-10-CM

## 2018-09-26 DIAGNOSIS — M20.12 VALGUS DEFORMITY OF BOTH GREAT TOES: ICD-10-CM

## 2018-09-26 DIAGNOSIS — E11.3293 MILD NONPROLIFERATIVE DIABETIC RETINOPATHY OF BOTH EYES WITHOUT MACULAR EDEMA ASSOCIATED WITH TYPE 2 DIABETES MELLITUS (HCC): ICD-10-CM

## 2018-09-26 DIAGNOSIS — M48.062 SPINAL STENOSIS OF LUMBAR REGION WITH NEUROGENIC CLAUDICATION: ICD-10-CM

## 2018-09-26 DIAGNOSIS — E11.42 DM TYPE 2 WITH DIABETIC PERIPHERAL NEUROPATHY (HCC): Primary | ICD-10-CM

## 2018-09-26 PROCEDURE — 11721 DEBRIDE NAIL 6 OR MORE: CPT | Performed by: PODIATRIST

## 2018-09-26 PROCEDURE — 99213 OFFICE O/P EST LOW 20 MIN: CPT | Performed by: PODIATRIST

## 2018-09-26 NOTE — PROGRESS NOTES
Elisa Fisher is a 82year old male. Patient presents with:  Diabetic Foot Care: Saw Dr Carlos Zepeda on 5/2/18. Last A1c was 5.1 on 4/18/18. BG was 97 this AM. Denies any pain in feet today.          HPI:   This pleasant patient returns to the clinic today for FISTULA; Left      Comment:  Performed by Sreekanth Gilmore MD at [facility] [employer]  1/23/2017: A.V. FISTULA; Left      Comment:  Performed by Sreekanth Gilmore MD at [address] [employer] [address]  11/21/2016: A.V. FISTULA;  Left      Comment:  Performed by Sreekanth Gilmore MD at [address] [place] [address] Drive POSSIBLE POLYPECTOMY [address];  Surgeon: Florrie Nageotte, MD;               Location: [address] [place] [address]  4/17/2015: PATIENT DOCUMENTED NOT TO HAVE EXPERIENCED ANY OF THE   FOLLOWING EVENTS; N/A      Comment:  Procedure: CAUDAL;  Surgeon: Christina Washburn, • Diabetes Brother       Social History    Socioeconomic History      Marital status:       Spouse name: Not on file      Number of children: Not on file      Years of education: Not on file      Highest education level: Not on file    Social Need skin was examined on both feet is warm and dry his toenails 1-5 or significantly thickened with yellow dystrophic changes subungual debris and distal lysis from the nailbed. This includes all 10 toenails. 2. Vascular:  The patient does have palpable puls

## 2018-12-05 ENCOUNTER — OFFICE VISIT (OUTPATIENT)
Dept: PODIATRY CLINIC | Facility: CLINIC | Age: 83
End: 2018-12-05
Payer: MEDICARE

## 2018-12-05 DIAGNOSIS — L84 TYLOMA: ICD-10-CM

## 2018-12-05 DIAGNOSIS — E11.3293 MILD NONPROLIFERATIVE DIABETIC RETINOPATHY OF BOTH EYES WITHOUT MACULAR EDEMA ASSOCIATED WITH TYPE 2 DIABETES MELLITUS (HCC): ICD-10-CM

## 2018-12-05 DIAGNOSIS — L97.511 SKIN ULCER OF TOE OF RIGHT FOOT, LIMITED TO BREAKDOWN OF SKIN (HCC): ICD-10-CM

## 2018-12-05 DIAGNOSIS — M20.12 VALGUS DEFORMITY OF BOTH GREAT TOES: ICD-10-CM

## 2018-12-05 DIAGNOSIS — M20.11 VALGUS DEFORMITY OF BOTH GREAT TOES: ICD-10-CM

## 2018-12-05 DIAGNOSIS — B35.1 ONYCHOMYCOSIS: ICD-10-CM

## 2018-12-05 DIAGNOSIS — E11.42 DM TYPE 2 WITH DIABETIC PERIPHERAL NEUROPATHY (HCC): Primary | ICD-10-CM

## 2018-12-05 DIAGNOSIS — M48.062 SPINAL STENOSIS OF LUMBAR REGION WITH NEUROGENIC CLAUDICATION: ICD-10-CM

## 2018-12-05 PROCEDURE — 99213 OFFICE O/P EST LOW 20 MIN: CPT | Performed by: PODIATRIST

## 2018-12-09 NOTE — PROGRESS NOTES
Andrés Tavarez is a 82year old male. Patient presents with:  Diabetic Foot Care: Saw Dr. Viral Judd on 10/17/18. Last A1c was 4.9. BG was not taken this AM. States BG is usually 110. Denies any pain in feet.          HPI:   Patient returns to the clinic for Past Surgical History:   Procedure Laterality Date   • A.V. FISTULA Left 3/27/2017    Performed by Nicolas Beck MD at [facility]   • A.V. FISTULA Left 1/23/2017    Performed by Nicolas Beck MD at [facility]   • A.V. FISTULA Left 11/21/2016    Performed denies heartburn  NEURO: denies headaches    EXAM:   There were no vitals taken for this visit. Physical Exam  GENERAL: well developed, well nourished, in no apparent distress  EXTREMITIES:   1.  Integument: The skin on both feet is warm but excessively dr progressing. The patient is very active he participates in exercise classes for right now I recommend that he discontinue until these continue to improve or show improvement. Once healed he may resume.   The importance of this and staying off his feet whi

## 2018-12-21 ENCOUNTER — OFFICE VISIT (OUTPATIENT)
Dept: PODIATRY CLINIC | Facility: CLINIC | Age: 83
End: 2018-12-21
Payer: MEDICARE

## 2018-12-21 ENCOUNTER — APPOINTMENT (OUTPATIENT)
Dept: GENERAL RADIOLOGY | Age: 83
End: 2018-12-21
Attending: EMERGENCY MEDICINE
Payer: MEDICARE

## 2018-12-21 ENCOUNTER — HOSPITAL ENCOUNTER (OUTPATIENT)
Age: 83
Discharge: HOME | End: 2018-12-21
Attending: EMERGENCY MEDICINE
Payer: MEDICARE

## 2018-12-21 VITALS
HEART RATE: 70 BPM | OXYGEN SATURATION: 95 % | RESPIRATION RATE: 18 BRPM | BODY MASS INDEX: 31.37 KG/M2 | WEIGHT: 207 LBS | SYSTOLIC BLOOD PRESSURE: 146 MMHG | TEMPERATURE: 98 F | HEIGHT: 68 IN | DIASTOLIC BLOOD PRESSURE: 52 MMHG

## 2018-12-21 DIAGNOSIS — L84 TYLOMA: ICD-10-CM

## 2018-12-21 DIAGNOSIS — M25.511 ACUTE PAIN OF RIGHT SHOULDER: Primary | ICD-10-CM

## 2018-12-21 DIAGNOSIS — B35.1 ONYCHOMYCOSIS: ICD-10-CM

## 2018-12-21 DIAGNOSIS — L97.511 SKIN ULCER OF TOE OF RIGHT FOOT, LIMITED TO BREAKDOWN OF SKIN (HCC): ICD-10-CM

## 2018-12-21 DIAGNOSIS — M20.12 VALGUS DEFORMITY OF BOTH GREAT TOES: ICD-10-CM

## 2018-12-21 DIAGNOSIS — M20.11 VALGUS DEFORMITY OF BOTH GREAT TOES: ICD-10-CM

## 2018-12-21 DIAGNOSIS — E11.42 DM TYPE 2 WITH DIABETIC PERIPHERAL NEUROPATHY (HCC): Primary | ICD-10-CM

## 2018-12-21 PROCEDURE — 99213 OFFICE O/P EST LOW 20 MIN: CPT

## 2018-12-21 PROCEDURE — 99213 OFFICE O/P EST LOW 20 MIN: CPT | Performed by: PODIATRIST

## 2018-12-21 PROCEDURE — 73030 X-RAY EXAM OF SHOULDER: CPT | Performed by: EMERGENCY MEDICINE

## 2018-12-21 PROCEDURE — 99203 OFFICE O/P NEW LOW 30 MIN: CPT

## 2018-12-21 NOTE — ED PROVIDER NOTES
Patient Seen in: [address] [facility]    History   Patient presents with:  Fall (musculoskeletal, neurologic)  Upper Extremity Injury (musculoskeletal)    Stated Complaint: Fall/Shoulder Pain    HPI    Patient states he slipped whi Pack years: 63        Types: Cigarettes        Quit date: 1998        Years since quittin.1      Smokeless tobacco: Never Used      Tobacco comment: quit 20 years ago    Alcohol use: No      Alcohol/week: 0.0 oz    Drug use: No      Review of Sys Approved by (CST): Alyssia Jim MD on 12/21/2018 at 14:42              MDM   Discussed with patient could have rotator cuff tear and would likely need further imaging of the shoulder.   Will place arm in sling for 3 days            Disposition an

## 2018-12-21 NOTE — ED INITIAL ASSESSMENT (HPI)
PATIENT STATES MISSING THE LAST STEP \"ON A STAIRWAY THIS AM\"  STATES HE REGAINED HIS BALANCE BUT STRUCK HIS RIGHT SHOULDER AGAINST A WALL WITH INCREASED SHOULDER PAIN AND DECREASED ROM OF HIS RIGHT SHOULDER SINCE INCIDENT.   PATIENT DENIES STRIKING HIS HE

## 2018-12-26 NOTE — PROGRESS NOTES
Hallie Dumont is a 82year old male. Patient presents with:  Diabetic Ulcer: bilateral hallux -- Denies drainage or pain in toes.  BG was 110 this AM.         HPI:   Patient returns the clinic for checkup on his ulceration of the right hallux and a smal [facility])    • Dialysis patient [facility])     [place] [address].    • DM kidney disease ([facility] [address]    • GOUT    • HYPERLIPIDEMIA    • HYPERTENSION    • Osteoarthritis       Past Surgical History:   Procedure Laterality Date   • A.V. FISTULA Left 3/27/2017    Pe any unusual skin lesions or rashes  RESPIRATORY: denies shortness of breath with exertion  CARDIOVASCULAR: denies chest pain on exertion  GI: denies abdominal pain and denies heartburn  NEURO: denies headaches    EXAM:   There were no vitals taken for this

## 2019-01-09 ENCOUNTER — OFFICE VISIT (OUTPATIENT)
Dept: PODIATRY CLINIC | Facility: CLINIC | Age: 84
End: 2019-01-09
Payer: MEDICARE

## 2019-01-09 DIAGNOSIS — M20.11 VALGUS DEFORMITY OF BOTH GREAT TOES: ICD-10-CM

## 2019-01-09 DIAGNOSIS — L84 TYLOMA: ICD-10-CM

## 2019-01-09 DIAGNOSIS — E11.42 DM TYPE 2 WITH DIABETIC PERIPHERAL NEUROPATHY (HCC): Primary | ICD-10-CM

## 2019-01-09 DIAGNOSIS — M20.12 VALGUS DEFORMITY OF BOTH GREAT TOES: ICD-10-CM

## 2019-01-09 PROCEDURE — 99213 OFFICE O/P EST LOW 20 MIN: CPT | Performed by: PODIATRIST

## 2019-01-09 NOTE — PROGRESS NOTES
Jaxon Villa is a 82year old male. Patient presents with:  Diabetic Ulcer: bilateral hallux -- Right hallux has scab. Denies any pain in hallux today.          HPI:   Patient returns to the clinic he has no complaints of fever chills thinks that the u Medical History:   Diagnosis Date   • CKD (chronic kidney disease) stage 4, GFR 15-29 ml/min ([facility])    • Diabetes ([facility]    • Dialysis patient [facility])     [place] [address].    • DM kidney disease ([facility]    • GOUT    • HYPERLIPIDEMIA    • HYPERTENSION Belt: Yes          REVIEW OF SYSTEMS:   Review of Systems  GENERAL HEALTH: feels well otherwise  SKIN: denies any unusual skin lesions or rashes  RESPIRATORY: denies shortness of breath with exertion  CARDIOVASCULAR: denies chest pain on exertion  GI: nadir

## 2019-01-11 ENCOUNTER — PATIENT MESSAGE (OUTPATIENT)
Dept: PODIATRY CLINIC | Facility: CLINIC | Age: 84
End: 2019-01-11

## 2019-01-11 NOTE — TELEPHONE ENCOUNTER
Regarding: Other  Contact: [phone]  ----- Message from Mychart Generic sent at 1/11/2019 11:43 AM CST -----    Dear Doctor Yoshi Campos, I just received a letter from [place] that you are no longer an \"in-network\" provider however because I ha

## 2019-02-20 ENCOUNTER — OFFICE VISIT (OUTPATIENT)
Dept: PODIATRY CLINIC | Facility: CLINIC | Age: 84
End: 2019-02-20
Payer: MEDICARE

## 2019-02-20 DIAGNOSIS — B35.1 ONYCHOMYCOSIS: ICD-10-CM

## 2019-02-20 DIAGNOSIS — M20.12 VALGUS DEFORMITY OF BOTH GREAT TOES: ICD-10-CM

## 2019-02-20 DIAGNOSIS — L84 TYLOMA: ICD-10-CM

## 2019-02-20 DIAGNOSIS — L97.521 SKIN ULCER OF TOE OF LEFT FOOT, LIMITED TO BREAKDOWN OF SKIN (HCC): Primary | ICD-10-CM

## 2019-02-20 DIAGNOSIS — E11.42 DM TYPE 2 WITH DIABETIC PERIPHERAL NEUROPATHY (HCC): ICD-10-CM

## 2019-02-20 DIAGNOSIS — M20.11 VALGUS DEFORMITY OF BOTH GREAT TOES: ICD-10-CM

## 2019-02-20 PROBLEM — R58 BLEEDING: Status: ACTIVE | Noted: 2018-09-25

## 2019-02-20 PROBLEM — D69.6 THROMBOCYTOPENIA (HCC): Status: ACTIVE | Noted: 2018-09-25

## 2019-02-20 PROBLEM — D63.1 ANEMIA DUE TO STAGE 5 CHRONIC KIDNEY DISEASE: Status: ACTIVE | Noted: 2019-02-20

## 2019-02-20 PROBLEM — R58 BLEEDING: Status: RESOLVED | Noted: 2018-09-25 | Resolved: 2019-02-20

## 2019-02-20 PROBLEM — T16.9XXA FB EAR: Status: ACTIVE | Noted: 2017-02-20

## 2019-02-20 PROBLEM — D53.9 MACROCYTIC ANEMIA: Status: ACTIVE | Noted: 2018-09-25

## 2019-02-20 PROBLEM — N18.5 ANEMIA DUE TO STAGE 5 CHRONIC KIDNEY DISEASE (HCC): Status: ACTIVE | Noted: 2019-02-20

## 2019-02-20 PROBLEM — N18.5 ANEMIA DUE TO STAGE 5 CHRONIC KIDNEY DISEASE: Status: ACTIVE | Noted: 2019-02-20

## 2019-02-20 PROBLEM — T16.9XXA FB EAR: Status: RESOLVED | Noted: 2017-02-20 | Resolved: 2019-02-20

## 2019-02-20 PROBLEM — D63.1 ANEMIA DUE TO STAGE 5 CHRONIC KIDNEY DISEASE (HCC): Status: ACTIVE | Noted: 2019-02-20

## 2019-02-20 PROBLEM — M10.9 GOUT OF MULTIPLE SITES: Status: ACTIVE | Noted: 2019-02-20

## 2019-02-20 PROCEDURE — 99213 OFFICE O/P EST LOW 20 MIN: CPT | Performed by: PODIATRIST

## 2019-02-25 NOTE — PROGRESS NOTES
Saud Hou is a 82year old male.  Patient presents with:  Diabetic Ulcer: Bilateral big toe f/u - states he is good - no more drainage - no pain - he is also here for diabetic care f/u - this AM his XD=995        HPI:   Patient returns to the clinic • Dialysis patient [facility])     [place] [address].    • DM kidney disease ([facility] [address]    • GOUT    • HYPERLIPIDEMIA    • HYPERTENSION    • Osteoarthritis       Past Surgical History:   Procedure Laterality Date   • A.V. FISTULA Left 3/27/2017    Performed unusual skin lesions or rashes  RESPIRATORY: denies shortness of breath with exertion  CARDIOVASCULAR: denies chest pain on exertion  GI: denies abdominal pain and denies heartburn  NEURO: denies headaches    EXAM:   There were no vitals taken for this vis works out and does exercise programs without therefore I have encouraged him to wear his diabetic shoes all the time because that is in part why this keeps recurring.   I explained to the patient he understood when he comes back next until bring his shoe ge

## 2019-04-10 PROBLEM — I77.0 AVF (ARTERIOVENOUS FISTULA) (HCC): Status: ACTIVE | Noted: 2019-04-10

## 2019-04-19 PROBLEM — R42 DIZZINESS: Status: ACTIVE | Noted: 2019-04-19

## 2019-04-22 ENCOUNTER — OFFICE VISIT (OUTPATIENT)
Dept: PODIATRY CLINIC | Facility: CLINIC | Age: 84
End: 2019-04-22
Payer: MEDICARE

## 2019-04-22 DIAGNOSIS — M20.12 VALGUS DEFORMITY OF BOTH GREAT TOES: ICD-10-CM

## 2019-04-22 DIAGNOSIS — M20.11 VALGUS DEFORMITY OF BOTH GREAT TOES: ICD-10-CM

## 2019-04-22 DIAGNOSIS — B35.1 ONYCHOMYCOSIS: ICD-10-CM

## 2019-04-22 DIAGNOSIS — N18.6 DIABETES MELLITUS WITH ESRD (END-STAGE RENAL DISEASE) (HCC): Primary | ICD-10-CM

## 2019-04-22 DIAGNOSIS — E11.22 DIABETES MELLITUS WITH ESRD (END-STAGE RENAL DISEASE) (HCC): Primary | ICD-10-CM

## 2019-04-22 DIAGNOSIS — L84 TYLOMA: ICD-10-CM

## 2019-04-22 DIAGNOSIS — E11.42 DM TYPE 2 WITH DIABETIC PERIPHERAL NEUROPATHY (HCC): ICD-10-CM

## 2019-04-22 PROCEDURE — 99213 OFFICE O/P EST LOW 20 MIN: CPT | Performed by: PODIATRIST

## 2019-04-22 NOTE — PROGRESS NOTES
Deena Stoner is a 82year old male.  Patient presents with:  Diabetic Foot Care: 2 mo f/u - last KuQ5O=4.9 from 4/17/19 and LOV with PCP Dr Mark Garcia was 4/10/19 - has no c/o regarding his feet - this AM his BS=92        HPI:   Patient returns to the clini GOUT    • HYPERLIPIDEMIA    • HYPERTENSION    • Osteoarthritis       Past Surgical History:   Procedure Laterality Date   • A.V. FISTULA Left 3/27/2017    Performed by Kemi Bernardo MD at [facility]   • A.V. FISTULA Left 1/23/2017    Performed by Colton Walker rashes  RESPIRATORY: denies shortness of breath with exertion  CARDIOVASCULAR: denies chest pain on exertion  GI: denies abdominal pain and denies heartburn  NEURO: denies headaches    EXAM:   There were no vitals taken for this visit.   Physical Exam    GE have insoles he can take the insoles out of the ones that do not fit use them and his other shoes. Today gave him a prescription for [employer] [facility] for new shoes. There were no sores ulcerations or other problems noted at this visit.     No follow-ups on rebeca

## 2019-04-23 ENCOUNTER — TELEPHONE (OUTPATIENT)
Dept: PODIATRY CLINIC | Facility: CLINIC | Age: 84
End: 2019-04-23

## 2019-04-29 ENCOUNTER — TELEPHONE (OUTPATIENT)
Dept: PODIATRY CLINIC | Facility: CLINIC | Age: 84
End: 2019-04-29

## 2019-04-29 NOTE — TELEPHONE ENCOUNTER
Pt calling in regards to forms for orthopedic shoes fitting. pls reference pt e-mail that was sent directly to provider thank you.

## 2019-04-30 NOTE — TELEPHONE ENCOUNTER
S/w pt and he states he went to [place] [address] in [place] with [facility] and they told him they needed us to call them with more info. Informed him I would call and see what was needed.    Called [employer] and s/w [facility] and she states pt needs a diabetic statement

## 2019-05-24 PROBLEM — I15.1 HYPERTENSION SECONDARY TO OTHER RENAL DISORDERS: Status: ACTIVE | Noted: 2017-04-05

## 2019-05-24 PROBLEM — N28.89 HYPERTENSION SECONDARY TO OTHER RENAL DISORDERS: Status: ACTIVE | Noted: 2017-04-05

## 2019-06-15 PROBLEM — Z96.652 S/P TKR (TOTAL KNEE REPLACEMENT), LEFT: Status: ACTIVE | Noted: 2019-06-15

## 2019-08-13 PROBLEM — D64.9 ANEMIA, UNSPECIFIED TYPE: Status: ACTIVE | Noted: 2018-09-25

## 2019-08-13 PROBLEM — Z79.4 TYPE 2 DIABETES MELLITUS WITH STAGE 5 CHRONIC KIDNEY DISEASE NOT ON CHRONIC DIALYSIS, WITH LONG-TERM CURRENT USE OF INSULIN (HCC): Status: ACTIVE | Noted: 2017-05-17

## 2019-08-13 PROBLEM — Z99.2 ESRD (END STAGE RENAL DISEASE) ON DIALYSIS (HCC): Status: ACTIVE | Noted: 2017-06-29

## 2019-08-13 PROBLEM — N18.6 ESRD (END STAGE RENAL DISEASE) ON DIALYSIS (HCC): Status: ACTIVE | Noted: 2017-06-29

## 2019-08-13 PROBLEM — N18.5 TYPE 2 DIABETES MELLITUS WITH STAGE 5 CHRONIC KIDNEY DISEASE NOT ON CHRONIC DIALYSIS, WITH LONG-TERM CURRENT USE OF INSULIN (HCC): Status: ACTIVE | Noted: 2017-05-17

## 2019-08-13 PROBLEM — R19.5 OCCULT BLOOD IN STOOLS: Status: ACTIVE | Noted: 2019-08-13

## 2019-08-13 PROBLEM — E11.22 TYPE 2 DIABETES MELLITUS WITH STAGE 5 CHRONIC KIDNEY DISEASE NOT ON CHRONIC DIALYSIS, WITH LONG-TERM CURRENT USE OF INSULIN (HCC): Status: ACTIVE | Noted: 2017-05-17

## 2019-09-10 PROBLEM — D12.6 ADENOMATOUS POLYP OF COLON, UNSPECIFIED PART OF COLON: Status: ACTIVE | Noted: 2019-09-10

## 2019-09-10 PROBLEM — K21.9 GASTROESOPHAGEAL REFLUX DISEASE WITHOUT ESOPHAGITIS: Status: ACTIVE | Noted: 2019-09-10

## 2019-09-10 PROBLEM — D12.6 HIGH GRADE DYSPLASIA IN COLONIC ADENOMA: Status: ACTIVE | Noted: 2019-09-10

## 2019-09-26 PROBLEM — E66.9 OBESITY (BMI 30-39.9): Status: ACTIVE | Noted: 2019-09-26

## 2020-03-03 PROBLEM — R01.1 CARDIAC MURMUR: Status: ACTIVE | Noted: 2020-03-03

## 2020-04-15 PROBLEM — L97.521 SKIN ULCER OF TOE OF LEFT FOOT, LIMITED TO BREAKDOWN OF SKIN (HCC): Status: ACTIVE | Noted: 2020-04-15

## 2020-09-01 ENCOUNTER — APPOINTMENT (OUTPATIENT)
Dept: GENERAL RADIOLOGY | Facility: HOSPITAL | Age: 85
DRG: 233 | End: 2020-09-01
Attending: EMERGENCY MEDICINE
Payer: MEDICARE

## 2020-09-01 ENCOUNTER — APPOINTMENT (OUTPATIENT)
Dept: NUCLEAR MEDICINE | Facility: HOSPITAL | Age: 85
DRG: 233 | End: 2020-09-01
Attending: EMERGENCY MEDICINE
Payer: MEDICARE

## 2020-09-01 ENCOUNTER — HOSPITAL ENCOUNTER (INPATIENT)
Facility: HOSPITAL | Age: 85
LOS: 13 days | Discharge: HOME HEALTH | DRG: 233 | End: 2020-09-14
Attending: EMERGENCY MEDICINE | Admitting: THORACIC SURGERY (CARDIOTHORACIC VASCULAR SURGERY)
Payer: MEDICARE

## 2020-09-01 ENCOUNTER — APPOINTMENT (OUTPATIENT)
Dept: CT IMAGING | Facility: HOSPITAL | Age: 85
DRG: 233 | End: 2020-09-01
Attending: EMERGENCY MEDICINE
Payer: MEDICARE

## 2020-09-01 DIAGNOSIS — R06.00 DYSPNEA ON EXERTION: ICD-10-CM

## 2020-09-01 DIAGNOSIS — I50.31 ACUTE DIASTOLIC CONGESTIVE HEART FAILURE (HCC): Primary | ICD-10-CM

## 2020-09-01 PROBLEM — R06.09 DYSPNEA ON EXERTION: Status: ACTIVE | Noted: 2020-09-01

## 2020-09-01 LAB
ALBUMIN SERPL-MCNC: 2.9 G/DL (ref 3.4–5)
ALBUMIN/GLOB SERPL: 0.8 {RATIO} (ref 1–2)
ALP LIVER SERPL-CCNC: 114 U/L (ref 45–117)
ALT SERPL-CCNC: 15 U/L (ref 16–61)
ANION GAP SERPL CALC-SCNC: 0 MMOL/L (ref 0–18)
APTT PPP: 33.2 SECONDS (ref 25.4–36.1)
AST SERPL-CCNC: 8 U/L (ref 15–37)
ATRIAL RATE: 66 BPM
BASOPHILS # BLD AUTO: 0.07 X10(3) UL (ref 0–0.2)
BASOPHILS NFR BLD AUTO: 0.8 %
BILIRUB SERPL-MCNC: 0.7 MG/DL (ref 0.1–2)
BUN BLD-MCNC: 28 MG/DL (ref 7–18)
BUN/CREAT SERPL: 4.5 (ref 10–20)
CALCIUM BLD-MCNC: 8.7 MG/DL (ref 8.5–10.1)
CHLORIDE SERPL-SCNC: 98 MMOL/L (ref 98–112)
CK SERPL-CCNC: 37 U/L (ref 39–308)
CO2 SERPL-SCNC: 33 MMOL/L (ref 21–32)
CREAT BLD-MCNC: 6.29 MG/DL (ref 0.7–1.3)
D-DIMER: 2.02 UG/ML FEU (ref ?–0.83)
DEPRECATED RDW RBC AUTO: 55.1 FL (ref 35.1–46.3)
EOSINOPHIL # BLD AUTO: 0.25 X10(3) UL (ref 0–0.7)
EOSINOPHIL NFR BLD AUTO: 3 %
ERYTHROCYTE [DISTWIDTH] IN BLOOD BY AUTOMATED COUNT: 14.5 % (ref 11–15)
GLOBULIN PLAS-MCNC: 3.8 G/DL (ref 2.8–4.4)
GLUCOSE BLD-MCNC: 108 MG/DL (ref 70–99)
GLUCOSE BLD-MCNC: 145 MG/DL (ref 70–99)
HCT VFR BLD AUTO: 28.6 % (ref 39–53)
HGB BLD-MCNC: 9.1 G/DL (ref 13–17.5)
IMM GRANULOCYTES # BLD AUTO: 0.04 X10(3) UL (ref 0–1)
IMM GRANULOCYTES NFR BLD: 0.5 %
INR BLD: 1.13 (ref 0.89–1.11)
LYMPHOCYTES # BLD AUTO: 1.1 X10(3) UL (ref 1–4)
LYMPHOCYTES NFR BLD AUTO: 13.2 %
M PROTEIN MFR SERPL ELPH: 6.7 G/DL (ref 6.4–8.2)
MCH RBC QN AUTO: 34.2 PG (ref 26–34)
MCHC RBC AUTO-ENTMCNC: 31.8 G/DL (ref 31–37)
MCV RBC AUTO: 107.5 FL (ref 80–100)
MONOCYTES # BLD AUTO: 0.74 X10(3) UL (ref 0.1–1)
MONOCYTES NFR BLD AUTO: 8.9 %
NEUTROPHILS # BLD AUTO: 6.13 X10 (3) UL (ref 1.5–7.7)
NEUTROPHILS # BLD AUTO: 6.13 X10(3) UL (ref 1.5–7.7)
NEUTROPHILS NFR BLD AUTO: 73.6 %
NT-PROBNP SERPL-MCNC: ABNORMAL PG/ML (ref ?–450)
OSMOLALITY SERPL CALC.SUM OF ELEC: 280 MOSM/KG (ref 275–295)
P AXIS: 42 DEGREES
P-R INTERVAL: 184 MS
PLATELET # BLD AUTO: 196 10(3)UL (ref 150–450)
POTASSIUM SERPL-SCNC: 3.9 MMOL/L (ref 3.5–5.1)
PSA SERPL DL<=0.01 NG/ML-MCNC: 14.9 SECONDS (ref 12.4–14.6)
Q-T INTERVAL: 476 MS
QRS DURATION: 152 MS
QTC CALCULATION (BEZET): 499 MS
R AXIS: -11 DEGREES
RBC # BLD AUTO: 2.66 X10(6)UL (ref 3.8–5.8)
SODIUM SERPL-SCNC: 131 MMOL/L (ref 136–145)
T AXIS: -7 DEGREES
TROPONIN I SERPL-MCNC: <0.045 NG/ML (ref ?–0.04)
TROPONIN I SERPL-MCNC: <0.045 NG/ML (ref ?–0.04)
VENTRICULAR RATE: 66 BPM
WBC # BLD AUTO: 8.3 X10(3) UL (ref 4–11)

## 2020-09-01 PROCEDURE — 85025 COMPLETE CBC W/AUTO DIFF WBC: CPT | Performed by: EMERGENCY MEDICINE

## 2020-09-01 PROCEDURE — 85379 FIBRIN DEGRADATION QUANT: CPT | Performed by: EMERGENCY MEDICINE

## 2020-09-01 PROCEDURE — 84484 ASSAY OF TROPONIN QUANT: CPT | Performed by: EMERGENCY MEDICINE

## 2020-09-01 PROCEDURE — 93005 ELECTROCARDIOGRAM TRACING: CPT

## 2020-09-01 PROCEDURE — 85730 THROMBOPLASTIN TIME PARTIAL: CPT | Performed by: EMERGENCY MEDICINE

## 2020-09-01 PROCEDURE — 82550 ASSAY OF CK (CPK): CPT | Performed by: INTERNAL MEDICINE

## 2020-09-01 PROCEDURE — 82962 GLUCOSE BLOOD TEST: CPT

## 2020-09-01 PROCEDURE — 99285 EMERGENCY DEPT VISIT HI MDM: CPT

## 2020-09-01 PROCEDURE — 70450 CT HEAD/BRAIN W/O DYE: CPT | Performed by: EMERGENCY MEDICINE

## 2020-09-01 PROCEDURE — 85610 PROTHROMBIN TIME: CPT | Performed by: EMERGENCY MEDICINE

## 2020-09-01 PROCEDURE — 93010 ELECTROCARDIOGRAM REPORT: CPT

## 2020-09-01 PROCEDURE — 78580 LUNG PERFUSION IMAGING: CPT | Performed by: EMERGENCY MEDICINE

## 2020-09-01 PROCEDURE — 83880 ASSAY OF NATRIURETIC PEPTIDE: CPT | Performed by: EMERGENCY MEDICINE

## 2020-09-01 PROCEDURE — 71045 X-RAY EXAM CHEST 1 VIEW: CPT | Performed by: EMERGENCY MEDICINE

## 2020-09-01 PROCEDURE — 80053 COMPREHEN METABOLIC PANEL: CPT | Performed by: EMERGENCY MEDICINE

## 2020-09-01 PROCEDURE — 84484 ASSAY OF TROPONIN QUANT: CPT | Performed by: INTERNAL MEDICINE

## 2020-09-01 PROCEDURE — 96374 THER/PROPH/DIAG INJ IV PUSH: CPT

## 2020-09-01 RX ORDER — AMLODIPINE BESYLATE 5 MG/1
10 TABLET ORAL DAILY
Status: DISCONTINUED | OUTPATIENT
Start: 2020-09-02 | End: 2020-09-08

## 2020-09-01 RX ORDER — CLONIDINE HYDROCHLORIDE 0.1 MG/1
0.1 TABLET ORAL 3 TIMES DAILY
Status: DISCONTINUED | OUTPATIENT
Start: 2020-09-01 | End: 2020-09-01

## 2020-09-01 RX ORDER — FUROSEMIDE 10 MG/ML
40 INJECTION INTRAMUSCULAR; INTRAVENOUS ONCE
Status: COMPLETED | OUTPATIENT
Start: 2020-09-01 | End: 2020-09-01

## 2020-09-01 RX ORDER — LOSARTAN POTASSIUM 50 MG/1
50 TABLET ORAL 2 TIMES DAILY
Status: DISCONTINUED | OUTPATIENT
Start: 2020-09-01 | End: 2020-09-01

## 2020-09-01 RX ORDER — DEXTROSE MONOHYDRATE 25 G/50ML
50 INJECTION, SOLUTION INTRAVENOUS
Status: DISCONTINUED | OUTPATIENT
Start: 2020-09-01 | End: 2020-09-08

## 2020-09-01 RX ORDER — AMLODIPINE BESYLATE 5 MG/1
10 TABLET ORAL DAILY
Status: DISCONTINUED | OUTPATIENT
Start: 2020-09-02 | End: 2020-09-01

## 2020-09-01 RX ORDER — ALLOPURINOL 300 MG/1
300 TABLET ORAL DAILY
Status: DISCONTINUED | OUTPATIENT
Start: 2020-09-02 | End: 2020-09-14

## 2020-09-01 RX ORDER — ATORVASTATIN CALCIUM 20 MG/1
20 TABLET, FILM COATED ORAL NIGHTLY
Status: DISCONTINUED | OUTPATIENT
Start: 2020-09-01 | End: 2020-09-01

## 2020-09-01 RX ORDER — FUROSEMIDE 40 MG/1
40 TABLET ORAL DAILY
Status: DISCONTINUED | OUTPATIENT
Start: 2020-09-02 | End: 2020-09-01

## 2020-09-01 RX ORDER — HEPARIN SODIUM 5000 [USP'U]/ML
5000 INJECTION, SOLUTION INTRAVENOUS; SUBCUTANEOUS EVERY 12 HOURS SCHEDULED
Status: DISCONTINUED | OUTPATIENT
Start: 2020-09-01 | End: 2020-09-08

## 2020-09-01 RX ORDER — ATORVASTATIN CALCIUM 20 MG/1
20 TABLET, FILM COATED ORAL NIGHTLY
Status: DISCONTINUED | OUTPATIENT
Start: 2020-09-01 | End: 2020-09-14

## 2020-09-01 RX ORDER — LOSARTAN POTASSIUM 50 MG/1
50 TABLET ORAL 2 TIMES DAILY
Status: DISCONTINUED | OUTPATIENT
Start: 2020-09-01 | End: 2020-09-08

## 2020-09-01 RX ORDER — ONDANSETRON 2 MG/ML
4 INJECTION INTRAMUSCULAR; INTRAVENOUS EVERY 4 HOURS PRN
Status: DISCONTINUED | OUTPATIENT
Start: 2020-09-01 | End: 2020-09-08

## 2020-09-01 RX ORDER — ASCORBIC ACID, THIAMINE, RIBOFLAVIN, NIACINAMIDE, PYRIDOXINE, FOLIC ACID, COBALAMIN, BIOTIN, PANTOTHENIC ACID 100; 1.5; 1.7; 20; 10; 1; 6; 300; 1 MG/1; MG/1; MG/1; MG/1; MG/1; MG/1; UG/1; UG/1; MG/1
1 TABLET, COATED ORAL DAILY
Status: DISCONTINUED | OUTPATIENT
Start: 2020-09-02 | End: 2020-09-14

## 2020-09-01 NOTE — ED PROVIDER NOTES
Patient Seen in: [facility] Emergency Department      History   Patient presents with:  Dyspnea WILLIE SOB  Fatigue    Stated Complaint: Dizzy, SOB on dialysis     HPI    82year-old with past medical history of CKD, on dialysis last fully dialyzed yes PROCEDURE UNLISTED     • TOTAL KNEE REPLACEMENT                      Social History    Tobacco Use      Smoking status: Former Smoker        Packs/day: 2.00        Years: 30.00        Pack years: 62        Types: Cigarettes        Quit date: 11/1/1998 Coordination: Coordination normal.   Psychiatric:         Mood and Affect: Mood normal.          ED Course     Labs Reviewed   COMP METABOLIC PANEL (14) - Abnormal; Notable for the following components:       Result Value    Glucose 145 (*)     Sodium stemi              Ct Brain Or Head (17212)    Result Date: 9/1/2020  PROCEDURE:  CT BRAIN OR HEAD (95331)  COMPARISON:  None. INDICATIONS:  Dizzy, SOB on dialysis  TECHNIQUE:  Noncontrast CT scanning is performed through the brain.  Dose reduction techniq on 9/01/2020 at 12:12 PM     Finalized by (CST): Sarita Ayon MD on 9/01/2020 at 12:15 PM       Xr Chest Ap Portable  (cpt=71045)    Result Date: 9/1/2020            PROCEDURE:  XR CHEST AP PORTABLE  (CPT=71045)  TECHNIQUE:  AP chest radiograph was obtain diagnosis)  Dyspnea on exertion    Disposition:  Admit  9/1/2020  1:01 pm    Follow-up:  No follow-up provider specified.         Medications Prescribed:  Current Discharge Medication List                       Present on Admission  Date Reviewed: 8/20/2020

## 2020-09-01 NOTE — CONSULTS
[facility]  Report of Consultation    Laney Kelly Patient Status:  Inpatient    1935 MRN [id number]   [facility] 2NE-A Attending Bella Kimball MD   Hosp Day # 0 PCP Kalen Lopez MD     Reason for Cons Infusion:       PRN Medications:   ondansetron HCl, glucose **OR** Glucose-Vitamin C **OR** dextrose **OR** glucose **OR** Glucose-Vitamin C    Outpatient Medications:   No current facility-administered medications on file prior to encounter.    atorvastati parts and rash    Review of Systems:   No fevers, chills, change in weight or bowel habits. Ten point review of systems is otherwise negative or unremarkable.     Physical Exam:    09/01/20  1425   BP: 105/37   Pulse: 70   Resp: (!) 165   Temp:      Wt Steven Brown Left ventricle: The cavity size is normal. Wall thickness is normal.     Systolic function is normal. The estimated ejection fraction is 55-60%. Wall motion is normal; there are no regional wall motion abnormalities.      Features are consistent with a

## 2020-09-01 NOTE — ED INITIAL ASSESSMENT (HPI)
Pt to ED from home with wife at bedside. Pt reports for one week he has felt dizzy, increased weakness and gets SOB with small amounts of activity. Pt reports he normal does exercise classes every morning but this week he has been unable to do so.  Pt gets

## 2020-09-01 NOTE — CONSULTS
[facility]    Report of Consultation    [facility] Patient Status:  Inpatient    1935 MRN [id number]   [facility] 2NE-A Attending Luis Fernando Mcintyre MD   Hosp Day # 0 PCP Aniya Rivas MD     Date of consult: 2020 COLONOSCOPY      30 yrs ago    • ESOPHAGOGASTRODUODENOSCOPY, POSSIBLE BIOPSY, POSSIBLE POLYPECTOMY [address]93 N/A 10/8/2012    Performed by Tho Raman MD at [facility] N/A 4/17/2015    Performed by Sathya Barnes MD at Sodium (Porcine) 5000 UNIT/ML injection 5,000 Units, 5,000 Units, Subcutaneous, 2 times per day  No current outpatient medications on file.         PHYSICAL EXAM:     Vital Signs: /37 (BP Location: Right arm)   Pulse 70   Temp 97.9 °F (36.6 °C) (Oral) 572.00 05/19/2017    CREUR 72.80 05/19/2017     Lab Results   Component Value Date    SPECGRAVITY 1.025 07/25/2014    NITRITE Negative 07/25/2014         IMAGING:     All imaging studies personally reviewed.     Ct Brain Or Head (03585)    Result Date: 9/1/ Schedule: MWF  -- Access: FLORINA JOE  -- Over the years have had trouble with removing more than 1.5L for the last few years due to side effects. He is very resistant despite many attempts educate him.  Discussed w him today, will aim for 2L tomorrow and see h

## 2020-09-01 NOTE — PLAN OF CARE
Received from ER. Alert and ox3. No sob room air. Denies c/o chest pain at this time but concerne about progressingly getting weaker. Tele NSR with hr-70s. Routine admission care rendered. Will notify MD for admitting orders. No BP left arm. HL right ac. Will initi

## 2020-09-02 LAB
ALBUMIN SERPL-MCNC: 2.8 G/DL (ref 3.4–5)
ANION GAP SERPL CALC-SCNC: 8 MMOL/L (ref 0–18)
BUN BLD-MCNC: 40 MG/DL (ref 7–18)
BUN/CREAT SERPL: 5.2 (ref 10–20)
CALCIUM BLD-MCNC: 9.2 MG/DL (ref 8.5–10.1)
CHLORIDE SERPL-SCNC: 99 MMOL/L (ref 98–112)
CO2 SERPL-SCNC: 27 MMOL/L (ref 21–32)
CREAT BLD-MCNC: 7.69 MG/DL (ref 0.7–1.3)
GLUCOSE BLD-MCNC: 104 MG/DL (ref 70–99)
GLUCOSE BLD-MCNC: 122 MG/DL (ref 70–99)
GLUCOSE BLD-MCNC: 132 MG/DL (ref 70–99)
GLUCOSE BLD-MCNC: 90 MG/DL (ref 70–99)
GLUCOSE BLD-MCNC: 97 MG/DL (ref 70–99)
HAV IGM SER QL: 2.8 MG/DL (ref 1.6–2.6)
OSMOLALITY SERPL CALC.SUM OF ELEC: 290 MOSM/KG (ref 275–295)
PHOSPHATE SERPL-MCNC: 5.2 MG/DL (ref 2.5–4.9)
POTASSIUM SERPL-SCNC: 4.4 MMOL/L (ref 3.5–5.1)
SARS-COV-2 RNA RESP QL NAA+PROBE: NOT DETECTED
SODIUM SERPL-SCNC: 134 MMOL/L (ref 136–145)

## 2020-09-02 PROCEDURE — 5A1D70Z PERFORMANCE OF URINARY FILTRATION, INTERMITTENT, LESS THAN 6 HOURS PER DAY: ICD-10-PCS | Performed by: INTERNAL MEDICINE

## 2020-09-02 PROCEDURE — 80069 RENAL FUNCTION PANEL: CPT | Performed by: INTERNAL MEDICINE

## 2020-09-02 PROCEDURE — 97161 PT EVAL LOW COMPLEX 20 MIN: CPT

## 2020-09-02 PROCEDURE — 82962 GLUCOSE BLOOD TEST: CPT

## 2020-09-02 PROCEDURE — 83735 ASSAY OF MAGNESIUM: CPT | Performed by: INTERNAL MEDICINE

## 2020-09-02 PROCEDURE — 90935 HEMODIALYSIS ONE EVALUATION: CPT | Performed by: INTERNAL MEDICINE

## 2020-09-02 RX ORDER — ACETAMINOPHEN 325 MG/1
650 TABLET ORAL EVERY 6 HOURS PRN
Status: DISCONTINUED | OUTPATIENT
Start: 2020-09-02 | End: 2020-09-08

## 2020-09-02 NOTE — PROGRESS NOTES
[facility] hospitalist daily note  Patient was seen/examined on 9/2/20    S: pt was orthostatic, clonidine and diuretic on hold for now  No chest pain, no SOB at rest, no abd pain, no nausea    Medications in Epic    PE    09/02/20  1215   BP: (!) 183/80   Pulse:

## 2020-09-02 NOTE — PLAN OF CARE
Assumed care at 0730. Pt A&O x4. Denies dizziness. Wears glasses, dentures and hearing aids. Denies chest pain and SOB. Lung sounds clear. NSR with rare PVCs on tele. Up with standby assist. Left AV fistula present, thrill and bruit noted.      POC: orthost

## 2020-09-02 NOTE — PAYOR COMM NOTE
--------------  ADMISSION REVIEW     Payor: [facility] [place] #:  [id number]  Authorization Number: [id number]       ED Provider Notes             Patient Seen in: [facility] Emergency Department      History   Patient presents wit by Mena Izquierdo MD at [address]   • OTHER      AV fistula creation-Nov/2016   • REVISION AV FISTULA      January/2017   • SPINE SURGERY PROCEDURE UNLISTED     • TOTAL KNEE REPLACEMENT         Review of Systems    Positive for stat Glucose 145 (*)     Sodium 131 (*)     CO2 33.0 (*)     BUN 28 (*)     Creatinine 6.29 (*)     BUN/CREA Ratio 4.5 (*)     GFR, Non- 8 (*)     GFR, -American 9 (*)     AST 8 (*)     ALT 15 (*)     Albumin 2.9 (*)     A/G Ratio 0.8 (*) brain. Dose reduction techniques were used. Dose information is transmitted to the [facility] [employer] [facility]) NRDR ([address]) which includes the Dose Index Registry.   PATIENT STATED HISTORY: (As transcribed by Technologist) TECHNIQUE:  AP chest radiograph was obtained.   COMPARISON:  [facility] , XR, XR CHEST AP PORTABLE  (CPT=71010), 1/23/2017, 6:14 AM.  INDICATIONS:  Dizzy, SOB on dialysis  PATIENT STATED HISTORY: (As transcribed by Technologist)  Patient shares he gets dialysis to dizziness for 1 week, generalized weakness and CORBIN. No chest pain or SOB at rest. No abd pain, no nausea. No bleeding. No fever. No PND/orthopnea.  Pain quantity, quality, duration, radiation absent  No new numbness/tingling/vision changes      ROS 10 sy antihypertensives               -D/C Clonidine  2. Continue Norvasc  3. Continue Cozaar at 50mg PO BID  4. Hold diuretic  5. Statin  6. Volume optimization with HD  7. F/U orthostatics  8. Repeat CV isoenzymes.     9.  Consider stress cardiolite once orthostatics  Monitor on telemetry  Fall precautions  Up with assist  PT eval  No focal neuro deficit on exam and CT head with chronic changes     SOB; CXR unremarkable, VQ low prob for PE.  Suspect symptoms due to Acute on chronic diastolic CHF  proBNP 31,

## 2020-09-02 NOTE — PROGRESS NOTES
[facility]    Nephrology Progress Note    Sheridan Revel Attending:  Jessica Santamaria MD     Cc: ESRD    SUBJECTIVE     Getting HD  No complaints    PHYSICAL EXAM   Vital signs: BP (!) 183/80 (BP Location: Right arm)   Pulse 69   Temp 98.3 °F 09/02/2020    CL 99 09/02/2020    CO2 27.0 09/02/2020     09/02/2020    CA 9.2 09/02/2020    ALB 2.8 09/02/2020    MG 2.8 09/02/2020    PHOS 5.2 09/02/2020    TROP <0.045 09/01/2020    CK 37 09/01/2020    PGLU 97 09/02/2020       IMAGING   All imagi Moderate vascular     calcification at the skull base. SINUSES:           No sign of acute sinusitis. MASTOIDS:          No sign of acute inflammation. SKULL:             No evidence for fracture or osseous abnormality.     OTHER:             Mul    HTN  -- Ideally more UF w HD but pt resistant in past, now agreeable. In ESRD pts volume management will help. -- off clonidine. Remains on amlodipine and cozaar  -- +orthostatic, so would aim for 150-160 pre dialysis    Hyponatremia  -- monitor.  Wi

## 2020-09-02 NOTE — PLAN OF CARE
Assumed care for pt at 23:30 on 9/1    A&Ox4  Denies dizziness or chest discomfort  VSS on Ra, spO2 93%  NSR with BBB on tele, rare PVC  L AV fistula with thrill/bruit, CORDELL  Up with standby assist.     Plan: Orthostatic BP daily, Dialysis on 9/3, possible

## 2020-09-02 NOTE — PROGRESS NOTES
Assumed pt care at [facility]. A&Ox4, glasses, dentures, and hearing aids at bedside. RA, denies chestpain/SOB, lung sounds clear. VSS, NSR on tele. Voids. Up with SBA. L AV fistula present, bruit and thrill noted.  POC HD in AM, orthos daily, poss stress test. P

## 2020-09-02 NOTE — PROGRESS NOTES
[facility] Cardiology  Progress Note    Bety Mccann Patient Status:  Inpatient    1935 MRN [id number]   [facility] 2NE-A Attending Luis Fernando Mcintyre MD   Hosp Day # 1 PCP Aniya Rivas MD     Subjective:   No chest p distress. HEENT:  Normocephalic. Atraumatic. No icterus. Neck:  There is no jugular venous distention. Cardiovascular:  Cardiovascular examination demonstrates a regular rate and rhythm. There is normal S1, S2. There is no S3 or S4.   There are no m increased filling     pressure (grade 2 diastolic dysfunction). 2. Aortic valve: Trileaflet; mildly thickened, mildly calcified leaflets.     There is mild stenosis. There is mild regurgitation. Peak velocity (S):     2.4m/sec. Mean gradient (S): 12mm Hg.

## 2020-09-02 NOTE — CERTIFICATION
**Certification    PHYSICIAN Certification of Need for Inpatient Hospitalization    Based on the his current state of illness, Agus Krueger requires inpatient hospitalization for his suspect CHF

## 2020-09-02 NOTE — PROGRESS NOTES
09/02/20 0516 09/02/20 0519 09/02/20 0522   Vital Signs   BP (!) 175/62 (!) 173/70 148/51   BP Location Right arm Right arm Right arm   BP Method Automatic Automatic Automatic   Patient Position Lying Sitting Standing

## 2020-09-02 NOTE — PHYSICAL THERAPY NOTE
PHYSICAL THERAPY QUICK EVALUATION - INPATIENT    Room Number: 4145/6499-E  Evaluation Date: 9/2/2020  Presenting Problem: acute diastolic congestive hear failure  Physician Order: PT Eval and Treat    CT brain dated 9/1/20:  =====  CONCLUSION:  Chronic c Two level  Stairs to Enter : 2  Railing: Yes  Stairs to Bedroom: 12  Railing: Yes    Lives With: Spouse  Drives: Yes     Patient Regularly Uses: Glasses    Prior Level of [place]: per pt report, pt lives in a two level home with spouse.  PTA pt is I wi (ft): 20  Assistive Device: None  Pattern: Within Functional Limits  Stoop/Curb Assistance: Not tested       Skilled Therapy Provided: PT orders received, chart reviewed, RN approved PT session.  PT with PPE donned to include: 2 masks, gloves, goggles, and presents during this admission. GOALS  Patient was able to achieve the following goals . ..     Patient was able to transfer At previous, functional level  Safely and independently   Patient able to ambulate on level surfaces At previous, functional level

## 2020-09-03 ENCOUNTER — APPOINTMENT (OUTPATIENT)
Dept: GENERAL RADIOLOGY | Facility: HOSPITAL | Age: 85
DRG: 233 | End: 2020-09-03
Attending: HOSPITALIST
Payer: MEDICARE

## 2020-09-03 ENCOUNTER — APPOINTMENT (OUTPATIENT)
Dept: CV DIAGNOSTICS | Facility: HOSPITAL | Age: 85
DRG: 233 | End: 2020-09-03
Attending: HOSPITALIST
Payer: MEDICARE

## 2020-09-03 LAB
ALBUMIN SERPL-MCNC: 3 G/DL (ref 3.4–5)
ANION GAP SERPL CALC-SCNC: 9 MMOL/L (ref 0–18)
BUN BLD-MCNC: 24 MG/DL (ref 7–18)
BUN/CREAT SERPL: 4.7 (ref 10–20)
CALCIUM BLD-MCNC: 9.7 MG/DL (ref 8.5–10.1)
CHLORIDE SERPL-SCNC: 101 MMOL/L (ref 98–112)
CO2 SERPL-SCNC: 25 MMOL/L (ref 21–32)
CREAT BLD-MCNC: 5.06 MG/DL (ref 0.7–1.3)
GLUCOSE BLD-MCNC: 100 MG/DL (ref 70–99)
GLUCOSE BLD-MCNC: 102 MG/DL (ref 70–99)
GLUCOSE BLD-MCNC: 103 MG/DL (ref 70–99)
GLUCOSE BLD-MCNC: 105 MG/DL (ref 70–99)
GLUCOSE BLD-MCNC: 125 MG/DL (ref 70–99)
HAV IGM SER QL: 2.6 MG/DL (ref 1.6–2.6)
OSMOLALITY SERPL CALC.SUM OF ELEC: 284 MOSM/KG (ref 275–295)
PHOSPHATE SERPL-MCNC: 3.8 MG/DL (ref 2.5–4.9)
POTASSIUM SERPL-SCNC: 4.2 MMOL/L (ref 3.5–5.1)
SARS-COV-2 RNA RESP QL NAA+PROBE: NOT DETECTED
SODIUM SERPL-SCNC: 135 MMOL/L (ref 136–145)

## 2020-09-03 PROCEDURE — 80069 RENAL FUNCTION PANEL: CPT | Performed by: INTERNAL MEDICINE

## 2020-09-03 PROCEDURE — 83735 ASSAY OF MAGNESIUM: CPT | Performed by: INTERNAL MEDICINE

## 2020-09-03 PROCEDURE — 71045 X-RAY EXAM CHEST 1 VIEW: CPT | Performed by: HOSPITALIST

## 2020-09-03 PROCEDURE — 82962 GLUCOSE BLOOD TEST: CPT

## 2020-09-03 PROCEDURE — 94640 AIRWAY INHALATION TREATMENT: CPT

## 2020-09-03 RX ORDER — IPRATROPIUM BROMIDE AND ALBUTEROL SULFATE 2.5; .5 MG/3ML; MG/3ML
3 SOLUTION RESPIRATORY (INHALATION) EVERY 6 HOURS PRN
Status: DISCONTINUED | OUTPATIENT
Start: 2020-09-03 | End: 2020-09-14

## 2020-09-03 RX ORDER — POLYETHYLENE GLYCOL 3350 17 G/17G
17 POWDER, FOR SOLUTION ORAL DAILY PRN
Status: DISCONTINUED | OUTPATIENT
Start: 2020-09-03 | End: 2020-09-08

## 2020-09-03 NOTE — PROGRESS NOTES
[facility]    Nephrology Progress Note    Jaxon Villa Attending:  Charis Rivers MD     Cc: ESRD    SUBJECTIVE     Attempted to see pt x 2, unavailable.  Discussed with pt over the phone  Notes SOB/Dyspnea overnight, improved this am  No n 5000 UNIT/ML injection 5,000 Units, 5,000 Units, Subcutaneous, 2 times per day  losartan Potassium (COZAAR) tab 50 mg, 50 mg, Oral, BID        LABS     Lab Results   Component Value Date    CREATSERUM 5.06 09/03/2020    BUN 24 09/03/2020     09/03/20 AM         Finalized by (CST): Milton Waldrop MD on 9/03/2020 at 7:48 AM        NM LUNG PERFUSION SCAN ONLY  (CPT=78580)   Final Result    PROCEDURE:  NM LUNG PERFUSION SCAN ONLY  (CPT=78580)         COMPARISON:  [facility] , XR, XR CHEST AP PORTABLE  (CPT=71 osseous abnormality. OTHER:             Multiple calcifications within the subcutaneous tissues     of the scalp noted may be vascular                               =====    CONCLUSION:  Chronic changes. No acute disease.               Dictated by (CST) dialysis today. ECHO from last week noted and reviewed. D/w pulm and cards and hospitalist. Repeat COVID sent. Will plan for R + LHC possibly tomorrow if covid neg to get better sense of volume status to guide fluid removal w HD.  If breathing worsens would

## 2020-09-03 NOTE — PROGRESS NOTES
[facility] Hospitalist Progress Note     PCP: Shira Yang MD    SUBJECTIVE:  Pt continues to have dizziness and LH. He had a lot of dyspnea overnight with chest pain. He states that this morning, he finally felt less chest pain and dyspnea.   He states he sti <0.045 <0.045           Meds:     • lidocaine-menthol  1 patch Transdermal Daily   • amLODIPine Besylate  10 mg Oral Daily   • atorvastatin  20 mg Oral Nightly   • allopurinol  300 mg Oral Daily   • multivitamin  1 tablet Oral Daily   • Heparin Sodium (Por patient and coordinating care:  35 minutes    Thank You,  Kyree Rayo,   [place] Hospitalist

## 2020-09-03 NOTE — PLAN OF CARE
Assumed care at 0730. A&O x4. RA O2 99% w/ occasional episodes of SOB. SR with a BBB. Positive orthostatics, otherwise VSS. No complaints of pain at this time. Stand by assist, continue to monitor dizziness and SOB.      POC: Stress test scheduled today, da

## 2020-09-03 NOTE — CONSULTS
Pulmonary / Critical Care H&P/Consult       NAME: Elva Mohamud Street: 8239/6256-V - MRN: [id number] - Age: 82year old - :  1935    Date of Admission: 2020  9:30 AM  Admission Diagnosis: Dyspnea on exertion [G11.42]  Acute diastolic conges N/A 10/8/2012    Performed by Juan Luis Smiley MD at [address] [facility],[address] N/A 4/17/2015    Performed by Stella Sibley MD at [address]   • OTHER      AV fistula creation-Nov/2016   • REVISION AV FISTULA      J on file        Attends meetings of clubs or organizations: Not on file        Relationship status: Not on file      Intimate partner violence:        Fear of current or ex partner: Not on file        Emotionally abused: Not on file        Physically abused 300 MG Oral Tab, TAKE 1 TABLET BY MOUTH EVERY DAY, Disp: 30 tablet, Rfl: 11  DIALYVITE/ZINC Oral Tab, TAKE ONE TABLET BY MOUTH EVERY DAY, Disp: 100 tablet, Rfl: PRN  ONETOUCH DELICA LANCETS 75N Does not apply Misc, TEST ONCE DAILY. LL:S39. 9. NIDDM, Disp: 100 conjunctiva/corneas clear   Neck:   Supple, symmetrical, trachea midline, no adenopathy;        thyroid:  No enlargement/tenderness/nodules; no carotid    bruit or JVD   Back:     Symmetric, no curvature, ROM normal, no CVA tenderness   Lungs:     Slight b unlikely. Less likely would be pneumonitis (given that cxr on admit was relatively unremarkable).    - repeat cxr in am  - bp and fluid management per cards / renal  - agree with repeat covid testing which is in process  - further cardiac w/u with stress t

## 2020-09-03 NOTE — PLAN OF CARE
Assumed care of pt around 1930. Pt Aox4. RA. Denies pain. NSR on tele. VSS. Plan for stress test in am. L AV fistula open to air. Will continue to monitor. 0045: pt feeling more SOB overnight, lung sounds clear. MD paged orders for neb treatment.  Pt re

## 2020-09-03 NOTE — PROGRESS NOTES
[facility] Cardiology  Progress Note    Victorina Ponce Patient Status:  Inpatient    1935 MRN [id number]   [facility] 2NE-A Attending Edgar Gunderson MD   Hosp Day # 2 PCP Melania Cortez MD     Subjective:   No chest p OTHER (SEE COMMENTS)    Comment:\"Black out\"  Keflex                      Comment:Swollen body parts and rash  Pcns [Penicillins]          Comment:Swollen body parts and rash    Physical Exam:   General:  Well-developed / Well-nourished.   No a Left ventricle:  The cavity size is normal. Wall thickness is normal.     Systolic function is normal. The estimated ejection fraction is 55-60%.     Wall motion is normal; there are no regional wall motion abnormalities.     Features are consistent with a HC if R/O Kitty Epps MD  9/3/2020

## 2020-09-03 NOTE — PLAN OF CARE
Discussed case with Dr. Leandro Mcclelland and Dr. Mariusz Jara. Developing ground glass opacities on chest Xray with increasing dyspnea this morning. Not thought to be volume related. Echocardiogram from 8/28 reviewed. Undergoing repeat testing for COVID.      Cancel today'

## 2020-09-03 NOTE — PROGRESS NOTES
09/03/20 0828 09/03/20 0829 09/03/20 0831   Vital Signs   Pulse 79 76 90   Heart Rate Source Monitor  --   --    Resp 17  --   --    Respiratory Quality Normal  --   --    /87 143/63 105/53   BP Location Right arm Right arm Right arm   BP Method A

## 2020-09-04 ENCOUNTER — APPOINTMENT (OUTPATIENT)
Dept: CT IMAGING | Facility: HOSPITAL | Age: 85
DRG: 233 | End: 2020-09-04
Attending: THORACIC SURGERY (CARDIOTHORACIC VASCULAR SURGERY)
Payer: MEDICARE

## 2020-09-04 ENCOUNTER — ANESTHESIA EVENT (OUTPATIENT)
Dept: CARDIAC SURGERY | Facility: HOSPITAL | Age: 85
DRG: 233 | End: 2020-09-04
Payer: MEDICARE

## 2020-09-04 ENCOUNTER — APPOINTMENT (OUTPATIENT)
Dept: GENERAL RADIOLOGY | Facility: HOSPITAL | Age: 85
DRG: 233 | End: 2020-09-04
Attending: INTERNAL MEDICINE
Payer: MEDICARE

## 2020-09-04 ENCOUNTER — APPOINTMENT (OUTPATIENT)
Dept: INTERVENTIONAL RADIOLOGY/VASCULAR | Facility: HOSPITAL | Age: 85
DRG: 233 | End: 2020-09-04
Attending: PHYSICIAN ASSISTANT
Payer: MEDICARE

## 2020-09-04 LAB
ALBUMIN SERPL-MCNC: 3 G/DL (ref 3.4–5)
ANION GAP SERPL CALC-SCNC: 8 MMOL/L (ref 0–18)
ATRIAL RATE: 80 BPM
BUN BLD-MCNC: 42 MG/DL (ref 7–18)
BUN/CREAT SERPL: 6 (ref 10–20)
CALCIUM BLD-MCNC: 8.9 MG/DL (ref 8.5–10.1)
CHLORIDE SERPL-SCNC: 101 MMOL/L (ref 98–112)
CO2 SERPL-SCNC: 28 MMOL/L (ref 21–32)
CREAT BLD-MCNC: 7.01 MG/DL (ref 0.7–1.3)
DEPRECATED RDW RBC AUTO: 53.8 FL (ref 35.1–46.3)
ERYTHROCYTE [DISTWIDTH] IN BLOOD BY AUTOMATED COUNT: 14.5 % (ref 11–15)
GLUCOSE BLD-MCNC: 104 MG/DL (ref 70–99)
GLUCOSE BLD-MCNC: 109 MG/DL (ref 70–99)
GLUCOSE BLD-MCNC: 92 MG/DL (ref 70–99)
HAV IGM SER QL: 2.9 MG/DL (ref 1.6–2.6)
HCT VFR BLD AUTO: 31 % (ref 39–53)
HGB BLD-MCNC: 10 G/DL (ref 13–17.5)
MCH RBC QN AUTO: 33.7 PG (ref 26–34)
MCHC RBC AUTO-ENTMCNC: 32.3 G/DL (ref 31–37)
MCV RBC AUTO: 104.4 FL (ref 80–100)
OSMOLALITY SERPL CALC.SUM OF ELEC: 294 MOSM/KG (ref 275–295)
P AXIS: 65 DEGREES
P-R INTERVAL: 170 MS
PHOSPHATE SERPL-MCNC: 4.5 MG/DL (ref 2.5–4.9)
PLATELET # BLD AUTO: 196 10(3)UL (ref 150–450)
POTASSIUM SERPL-SCNC: 4.6 MMOL/L (ref 3.5–5.1)
Q-T INTERVAL: 448 MS
QRS DURATION: 138 MS
QTC CALCULATION (BEZET): 516 MS
R AXIS: 6 DEGREES
RBC # BLD AUTO: 2.97 X10(6)UL (ref 3.8–5.8)
SODIUM SERPL-SCNC: 137 MMOL/L (ref 136–145)
T AXIS: 15 DEGREES
VENTRICULAR RATE: 80 BPM
WBC # BLD AUTO: 12 X10(3) UL (ref 4–11)

## 2020-09-04 PROCEDURE — 93005 ELECTROCARDIOGRAM TRACING: CPT

## 2020-09-04 PROCEDURE — 93010 ELECTROCARDIOGRAM REPORT: CPT | Performed by: INTERNAL MEDICINE

## 2020-09-04 PROCEDURE — 71046 X-RAY EXAM CHEST 2 VIEWS: CPT | Performed by: INTERNAL MEDICINE

## 2020-09-04 PROCEDURE — 71250 CT THORAX DX C-: CPT | Performed by: THORACIC SURGERY (CARDIOTHORACIC VASCULAR SURGERY)

## 2020-09-04 PROCEDURE — 93456 R HRT CORONARY ARTERY ANGIO: CPT

## 2020-09-04 PROCEDURE — 90935 HEMODIALYSIS ONE EVALUATION: CPT | Performed by: INTERNAL MEDICINE

## 2020-09-04 PROCEDURE — 80069 RENAL FUNCTION PANEL: CPT | Performed by: INTERNAL MEDICINE

## 2020-09-04 PROCEDURE — 82962 GLUCOSE BLOOD TEST: CPT

## 2020-09-04 PROCEDURE — B211YZZ FLUOROSCOPY OF MULTIPLE CORONARY ARTERIES USING OTHER CONTRAST: ICD-10-PCS | Performed by: INTERNAL MEDICINE

## 2020-09-04 PROCEDURE — 85027 COMPLETE CBC AUTOMATED: CPT | Performed by: INTERNAL MEDICINE

## 2020-09-04 PROCEDURE — 83735 ASSAY OF MAGNESIUM: CPT | Performed by: INTERNAL MEDICINE

## 2020-09-04 PROCEDURE — 93454 CORONARY ARTERY ANGIO S&I: CPT

## 2020-09-04 PROCEDURE — 99152 MOD SED SAME PHYS/QHP 5/>YRS: CPT

## 2020-09-04 PROCEDURE — 4A023N8 MEASUREMENT OF CARDIAC SAMPLING AND PRESSURE, BILATERAL, PERCUTANEOUS APPROACH: ICD-10-PCS | Performed by: INTERNAL MEDICINE

## 2020-09-04 RX ORDER — HEPARIN SODIUM 5000 [USP'U]/ML
INJECTION, SOLUTION INTRAVENOUS; SUBCUTANEOUS
Status: COMPLETED
Start: 2020-09-04 | End: 2020-09-04

## 2020-09-04 RX ORDER — SODIUM CHLORIDE 9 MG/ML
INJECTION, SOLUTION INTRAVENOUS
Status: ACTIVE | OUTPATIENT
Start: 2020-09-05 | End: 2020-09-05

## 2020-09-04 RX ORDER — MIDAZOLAM HYDROCHLORIDE 1 MG/ML
INJECTION INTRAMUSCULAR; INTRAVENOUS
Status: COMPLETED
Start: 2020-09-04 | End: 2020-09-04

## 2020-09-04 RX ORDER — LIDOCAINE HYDROCHLORIDE 10 MG/ML
INJECTION, SOLUTION EPIDURAL; INFILTRATION; INTRACAUDAL; PERINEURAL
Status: COMPLETED
Start: 2020-09-04 | End: 2020-09-04

## 2020-09-04 RX ORDER — SODIUM CHLORIDE 9 MG/ML
INJECTION, SOLUTION INTRAVENOUS
Status: DISCONTINUED | OUTPATIENT
Start: 2020-09-05 | End: 2020-09-04 | Stop reason: HOSPADM

## 2020-09-04 RX ORDER — DOCUSATE SODIUM 100 MG/1
100 CAPSULE, LIQUID FILLED ORAL 2 TIMES DAILY
Status: DISCONTINUED | OUTPATIENT
Start: 2020-09-04 | End: 2020-09-05

## 2020-09-04 NOTE — PLAN OF CARE
Received patient at 0730. Alert and orientated x4. Tele Rhythm NSR. O2 saturation 98% on RA. Breath sounds clear. No C/O chest pain or Sob. Orthostatic (+), reported dizziness on standing. Pt tolerating ambulation. Left arm precautions, L AV fistula.  CXR c

## 2020-09-04 NOTE — PROGRESS NOTES
[facility]    Nephrology Progress Note    Robyn Roach Attending:  César Forbes MD     Cc: ESRD    SUBJECTIVE     Laying comfortable in bed not in acute distress.   Does still report having SOB    PHYSICAL EXAM   Vital signs: BP (!) 164/7 Oral, Q15 Min PRN  amLODIPine Besylate (NORVASC) tab 10 mg, 10 mg, Oral, Daily  atorvastatin (LIPITOR) tab 20 mg, 20 mg, Oral, Nightly  allopurinol (ZYLOPRIM) tab 300 mg, 300 mg, Oral, Daily  multivitamin (DIALYVITE - RENAL) tab 1 tablet, 1 tablet, Oral, D Cardiomegaly with mild     pulmonary vascular congestion. The overall findings could be due to     pulmonary edema from congestive failure, with     atypical pneumonia from COVID-19 infection or other etiologies not     entirely excluded.   Clinical correl matter ischemic change in the periventricular deep white matter. No     extra-axial fluid collections. No midline shift. The basal cisterns are     normal and patent. Moderate vascular     calcification at the skull base.     SINUSES:           No sign assess volume status.     HTN  -- Continue amlodipine and losartan  -- monitor orthostatics. Hyponatremia  -- monitor. Improved with iHD     Weakness . Elevated BNP  -- Angiogram today.       Thank you for allowing me to participate in the care of this

## 2020-09-04 NOTE — PROGRESS NOTES
Cath:    LM: 95% with filling defect and calcium. LAD: Ostial disease from LM. CX: Ostail 95%, probably co-dominant vessel. RCA: Occluded. May be non-dominant. Limited pictures given severe disease.     RA: 0  RV35/2  PA 31/10  PCW 8  CO 6.2    Femoral

## 2020-09-04 NOTE — PROGRESS NOTES
[facility] Hospitalist Progress Note     PCP: Emery Gonzales MD    SUBJECTIVE:  Pt feels sob presently while lying flat.     OBJECTIVE:  Temp:  [98.2 °F (36.8 °C)-98.8 °F (37.1 °C)] 98.2 °F (36.8 °C)  Pulse:  [69-81] 69  Resp:  [17-18] 18  BP: (128-172)/(59-71) 16 lidocaine-menthol  1 patch Transdermal Daily   • docusate sodium  100 mg Oral BID   • amLODIPine Besylate  10 mg Oral Daily   • atorvastatin  20 mg Oral Nightly   • allopurinol  300 mg Oral Daily   • multivitamin  1 tablet Oral Daily   • Heparin Sodium (Po

## 2020-09-04 NOTE — CONSULTS
[address]    PATIENT'S NAME: ST Amarilys BORREGO   ATTENDING PHYSICIAN: Antoinette Arvizu D.O.   CONSULTING PHYSICIAN: Anisa Bustos MD   PATIENT ACCOUNT#:   [de-identified]    LOCATION:  [address] [place] [address]  MEDICAL RECORD #:   [id number]       DATE OF BIRTH:  [birth year] There is no murmur. There is no S3.    ABDOMEN:  Benign. EXTREMITIES:  Bilateral knee replacements. There is no edema. Right heart catheterization demonstrated low filling pressures.     IMPRESSION:  The patient has significant left main coronary chauncey

## 2020-09-04 NOTE — ANESTHESIA PREPROCEDURE EVALUATION
PRE-OP EVALUATION    Patient Name: Chelsea Garcia    Pre-op Diagnosis: Coronary artery disease    Procedure(s):  Coronary artery bypass   IP    Surgeon(s) and Role:     * Elena Virk MD - Primary    Pre-op vitals reviewed.   Temp: 98.1 °F (36.7 °C)  Pu PRN  amLODIPine Besylate (NORVASC) tab 10 mg, 10 mg, Oral, Daily  atorvastatin (LIPITOR) tab 20 mg, 20 mg, Oral, Nightly  allopurinol (ZYLOPRIM) tab 300 mg, 300 mg, Oral, Daily  multivitamin (DIALYVITE - RENAL) tab 1 tablet, 1 tablet, Oral, Daily  Heparin complications         GI/Hepatic/Renal      (+) GERD and well controlled      (+) chronic renal disease (dialysis today.) and ESRD and hemodialysis                   Cardiovascular    Negative cardiovascular ROS.     Exercise tolerance: good     MET: >4 [facility])     Dyspnea on exertion        Past Surgical History:   Procedure Laterality Date   • A.V. FISTULA Left 3/27/2017    Performed by Otilio Meigs, MD at [address] [place] [address]   • A.V. FISTULA Left 1/23/2017    Performed by Otilio Meigs, MD at [address] [place] [address]   • A.V. F 09/07/2020    CA 9.0 09/07/2020     Lab Results   Component Value Date    INR 1.12 (H) 09/05/2020         Airway      Mallampati: II  Mouth opening: >3 FB  TM distance: > 6 cm  Neck ROM: full Cardiovascular      Rhythm: regular  Rate: normal  (-) murmur

## 2020-09-04 NOTE — PLAN OF CARE
Received patient from cath lab at [phone]. Right groin site C/D/I, soft, no hematoma. Pulses palpable. Recovered per protocol. Dr. Anya Bobo at bedside to discuss open heart surgery. Patient taken for CT of chest to evaluate aorta.  Chest CT showed grown glass lesio perfusion - ex.  Angina  - Evaluate fluid balance, assess for edema, trend weights  Outcome: Progressing  Goal: Absence of cardiac arrhythmias or at baseline  Description  INTERVENTIONS:  - Continuous cardiac monitoring, monitor vital signs, obtain 12 lead

## 2020-09-04 NOTE — PROGRESS NOTES
[facility] Cardiology  Progress Note    Sugeysharan Weber Patient Status:  Inpatient    1935 MRN [id number]   [facility] 2NE-A Attending Marthenia Goodell, MD   Hosp Day # 3 PCP Rola Mejia MD     Subjective:   No chest p OTHER (SEE COMMENTS)    Comment:\"Black out\"  Keflex                      Comment:Swollen body parts and rash  Pcns [Penicillins]          Comment:Swollen body parts and rash    Physical Exam:   General:  Well-developed / Well-nourished.   No acute distres Recent Labs   Lab 09/01/20  0953 09/01/20  1858   TROP <0.045 <0.045   CK  --  37*         Tele: SR    Diagnostics:  Echo 8/27/20:      Summary:     1. Left ventricle:  The cavity size is normal. Wall thickness is normal.     Systolic function is norm pending RHC measurements  7. May need further medication titration for HTN once euvolemic. 8.  R+L HC today. The nature of cardiac catheterization has been reviewed in detail.   Risks including, but not limited to the major risks of CVA, MI, and death ha

## 2020-09-04 NOTE — PROGRESS NOTES
09/04/20 1213 09/04/20 1216 09/04/20 1218   Vital Signs   /45 148/67 106/50   BP Location Right arm Right arm Right arm   BP Method Automatic Automatic Automatic   Patient Position Lying Sitting Standing     Pt reported feeling dizzy upon standing

## 2020-09-04 NOTE — PLAN OF CARE
Assumed care of pt around 1930. Pt Aox4. RA. Denies pain. NSR on tele. VSS. Idolation precautions maintained. PCR negative, MD notified. Isolation dc. Plan for angiogram tomorrow and then dialysis after procedure. Will continue to monitor.        Problem: D

## 2020-09-04 NOTE — PROGRESS NOTES
[facility] Patient Status:  Inpatient    1935 MRN [id number]   [facility] 2NE-A Attending Dorinda Isaac, DO   Hosp Day # 3 PCP Kaushik Romo MD     Pulm / Critical Care Progress Note     S: overall pt is feelin .0 196.0     Recent Labs   Lab 09/01/20  0953   INR 1.13*         Recent Labs   Lab 09/02/20  0832 09/03/20  0616 09/04/20  0545   * 135* 137   K 4.4 4.2 4.6   CL 99 101 101   CO2 27.0 25.0 28.0   BUN 40* 24* 42*     Creatinine, Serum (mg/dL

## 2020-09-05 ENCOUNTER — ANESTHESIA (OUTPATIENT)
Dept: CARDIAC SURGERY | Facility: HOSPITAL | Age: 85
DRG: 233 | End: 2020-09-05
Payer: MEDICARE

## 2020-09-05 LAB
ALBUMIN SERPL-MCNC: 3 G/DL (ref 3.4–5)
ANION GAP SERPL CALC-SCNC: 1 MMOL/L (ref 0–18)
APTT PPP: 32.3 SECONDS (ref 25.4–36.1)
BUN BLD-MCNC: 25 MG/DL (ref 7–18)
BUN/CREAT SERPL: 5 (ref 10–20)
CALCIUM BLD-MCNC: 9.4 MG/DL (ref 8.5–10.1)
CHLORIDE SERPL-SCNC: 103 MMOL/L (ref 98–112)
CO2 SERPL-SCNC: 32 MMOL/L (ref 21–32)
CREAT BLD-MCNC: 4.96 MG/DL (ref 0.7–1.3)
GLUCOSE BLD-MCNC: 104 MG/DL (ref 70–99)
GLUCOSE BLD-MCNC: 132 MG/DL (ref 70–99)
GLUCOSE BLD-MCNC: 85 MG/DL (ref 70–99)
GLUCOSE BLD-MCNC: 91 MG/DL (ref 70–99)
GLUCOSE BLD-MCNC: 94 MG/DL (ref 70–99)
HAV IGM SER QL: 2.4 MG/DL (ref 1.6–2.6)
INR BLD: 1.12 (ref 0.89–1.11)
OSMOLALITY SERPL CALC.SUM OF ELEC: 286 MOSM/KG (ref 275–295)
PHOSPHATE SERPL-MCNC: 3.6 MG/DL (ref 2.5–4.9)
POTASSIUM SERPL-SCNC: 4.3 MMOL/L (ref 3.5–5.1)
PSA SERPL DL<=0.01 NG/ML-MCNC: 14.8 SECONDS (ref 12.4–14.6)
SODIUM SERPL-SCNC: 136 MMOL/L (ref 136–145)

## 2020-09-05 PROCEDURE — 85610 PROTHROMBIN TIME: CPT | Performed by: NURSE PRACTITIONER

## 2020-09-05 PROCEDURE — 85730 THROMBOPLASTIN TIME PARTIAL: CPT | Performed by: NURSE PRACTITIONER

## 2020-09-05 PROCEDURE — 83735 ASSAY OF MAGNESIUM: CPT | Performed by: INTERNAL MEDICINE

## 2020-09-05 PROCEDURE — 82962 GLUCOSE BLOOD TEST: CPT

## 2020-09-05 PROCEDURE — 80069 RENAL FUNCTION PANEL: CPT | Performed by: INTERNAL MEDICINE

## 2020-09-05 RX ORDER — DOXYCYCLINE HYCLATE 100 MG/1
100 CAPSULE ORAL EVERY 12 HOURS SCHEDULED
Status: DISPENSED | OUTPATIENT
Start: 2020-09-05 | End: 2020-09-12

## 2020-09-05 RX ORDER — BISMUTH SUBSALICYLATE 262 MG/1
1 TABLET, CHEWABLE ORAL 3 TIMES DAILY PRN
Status: DISCONTINUED | OUTPATIENT
Start: 2020-09-05 | End: 2020-09-14

## 2020-09-05 NOTE — PROGRESS NOTES
[facility] Hospitalist Progress Note     [facility]      SUBJECTIVE:  Feeling ok resting in bed  SOB with exertion  Having diarrhea after stool softeners    OBJECTIVE:  Temp:  [97.6 °F (36.4 °C)-98.8 °F (37.1 °C)] 98.3 °F (36.8 °C)  Pulse:  [72-98] 95  Res hypoxia  COMPARISON:  [facility] , XR, XR CHEST AP PORTABLE  (CPT=71045), 9/03/2020, 5:41 AM.  [facility] , XR, XR CHEST AP PORTABLE  (CPT=71045), 9/01/2020, 10:15 AM.  TECHNIQUE:  PA and lateral chest radiographs were obtained.   PATIENT STATED HISTORY: (As transc No mass or axillary adenopathy.   LIMITED ABDOMEN:  Limited images of the upper abdomen demonstrate cholelithiasis, small hiatal hernia, a small atrophic left kidney, and right renal low-density masses, the largest is located within the superior pole medial No evidence for fracture or osseous abnormality. OTHER:             Multiple calcifications within the subcutaneous tissues of the scalp noted may be vascular            CONCLUSION:  Chronic changes. No acute disease.    Dictated by (CST): Sage Manning development of mixed interstitial and ground-glass opacities throughout the bilateral lungs. Cardiomegaly with mild pulmonary vascular congestion.   The overall findings could be due to pulmonary edema from congestive failure, with atypical pneumonia from tab 4 tablet, 4 tablet, Oral, Q15 Min PRN    Or  dextrose 50 % injection 50 mL, 50 mL, Intravenous, Q15 Min PRN    Or  glucose (DEX4) oral liquid 30 g, 30 g, Oral, Q15 Min PRN    Or  Glucose-Vitamin C (DEX-4) chewable tab 8 tablet, 8 tablet, Oral, Q15 Min

## 2020-09-05 NOTE — PROGRESS NOTES
Pulmonary Progress Note      NAME: [address] [place] [address] Street: 2756/5582-R - MRN: [id number] - Age: 82year old - : 1935    Assessment/Plan:    1.  Dyspnea on exertion, abnormal CT chest  - [facility] showed left main, cx and RCA disease  - CT chest has demon hours) at 9/5/2020 9246  Last data filed at 9/4/2020 2015  Gross per 24 hour   Intake 0 ml   Output 800 ml   Net -800 ml     Physical Exam:  BP (!) 177/84   Pulse 88   Temp 98.2 °F (36.8 °C) (Temporal)   Resp 18   Ht 5' 9\" (1.753 m)   Wt 185 lb 3.2 oz (84

## 2020-09-05 NOTE — PLAN OF CARE
0730 A/Ox4, up in chair, denies pain. Lungs clear bilat posteriorly, RA, dyspnea with minimal exertion noted, no SOB at rest. NSR, -160 at rest, no edema, orthostatic +. Tolerated ambulating in room to BR and sitting in chair for meals.  C/O loose st hypotension and signs of decreased cardiac output  - Evaluate effectiveness of vasoactive medications to optimize hemodynamic stability  - Monitor arterial and/or venous puncture sites for bleeding and/or hematoma  - Assess quality of pulses, skin color an

## 2020-09-05 NOTE — PROGRESS NOTES
[facility]    Nephrology Progress Note    Chelsea Garcia Attending:  Sudeep Tucker MD     Cc: ESRD    SUBJECTIVE     Tolerated HD yesterday without any issues.     PHYSICAL EXAM   Vital signs: BP (!) 174/77   Pulse 98   Temp 98.3 °F (36.8 °C Oral, Q15 Min PRN  amLODIPine Besylate (NORVASC) tab 10 mg, 10 mg, Oral, Daily  atorvastatin (LIPITOR) tab 20 mg, 20 mg, Oral, Nightly  allopurinol (ZYLOPRIM) tab 300 mg, 300 mg, Oral, Daily  multivitamin (DIALYVITE - RENAL) tab 1 tablet, 1 tablet, Oral, D contrast.      ADRI:  No mass or adenopathy. MEDIASTINUM:  Multiple scattered nodes may be reactive. CARDIAC:  Coronary artery calcifications. PLEURA:  No mass or effusion. THORACIC AORTA:  Atherosclerosis.      CHEST WALL:  No mass or axi There is resolution of the previous     interstitial edema and venous congestion. Decrease in the small left     effusion. No mass or pneumonia. No lymphadenopathy. Stable degenerative disc     disease.                    Dictated by (CST): Roque PROCEDURE:  NM LUNG PERFUSION SCAN ONLY  (CPT=78580)         COMPARISON:  [facility] , XR, XR CHEST AP PORTABLE  (CPT=71045), 9/01/2020,     10:15 AM.         INDICATIONS:  Felt dizzy and weakness with short of breath.          TECHNIQUE:  Tc-99m MAA was inject =====    CONCLUSION:  Chronic changes. No acute disease.               Dictated by (CST): Benjamín Lake MD on 9/01/2020 at 11:13 AM         Finalized by (CST): Benjamín Lake MD on 9/01/2020 at 11:24 AM        XR CHEST AP PORT

## 2020-09-05 NOTE — PLAN OF CARE
Assumed care of patient at [address] [facility]. Patient is AOX4, following commands, and CORTES. Patient denies nausea or pain and states dyspnea with exertion is improving.  Dialysis treatment finished at approx 2015, output 800ml due to patient feeling dizzy and hypotensiv

## 2020-09-05 NOTE — PROGRESS NOTES
Cards    FU: CAD    CORBIN with mild activity. Otherwise no events. Pulm eval and CTS eval.    No CP.     Doxycycline Hyclate (VIBRAMYCIN) cap 100 mg, 100 mg, Oral, 2 times per day  0.9% NaCl infusion, , Intravenous, On Call  docusate sodium (COLACE) cap PTT 32.3 09/05/2020    INR 1.12 09/05/2020    MG 2.4 09/05/2020    PHOS 3.6 09/05/2020     CT: Reviewed. Angio: LM disease, occluded RCA. A/P: Severe CAD. 1. CAD: Suggest CABG, timing per CT.   PCI possible but technically suboptimal.  2. Lungs: C

## 2020-09-06 LAB
ALBUMIN SERPL-MCNC: 2.9 G/DL (ref 3.4–5)
ANION GAP SERPL CALC-SCNC: 4 MMOL/L (ref 0–18)
BASOPHILS # BLD AUTO: 0.07 X10(3) UL (ref 0–0.2)
BASOPHILS NFR BLD AUTO: 0.6 %
BUN BLD-MCNC: 40 MG/DL (ref 7–18)
BUN/CREAT SERPL: 5.5 (ref 10–20)
CALCIUM BLD-MCNC: 9.3 MG/DL (ref 8.5–10.1)
CHLORIDE SERPL-SCNC: 105 MMOL/L (ref 98–112)
CO2 SERPL-SCNC: 27 MMOL/L (ref 21–32)
CREAT BLD-MCNC: 7.33 MG/DL (ref 0.7–1.3)
DEPRECATED RDW RBC AUTO: 56.3 FL (ref 35.1–46.3)
EOSINOPHIL # BLD AUTO: 0.34 X10(3) UL (ref 0–0.7)
EOSINOPHIL NFR BLD AUTO: 3.1 %
ERYTHROCYTE [DISTWIDTH] IN BLOOD BY AUTOMATED COUNT: 14.6 % (ref 11–15)
GLUCOSE BLD-MCNC: 108 MG/DL (ref 70–99)
GLUCOSE BLD-MCNC: 124 MG/DL (ref 70–99)
GLUCOSE BLD-MCNC: 80 MG/DL (ref 70–99)
GLUCOSE BLD-MCNC: 83 MG/DL (ref 70–99)
GLUCOSE BLD-MCNC: 94 MG/DL (ref 70–99)
GLUCOSE BLD-MCNC: 96 MG/DL (ref 70–99)
HAV IGM SER QL: 2.4 MG/DL (ref 1.6–2.6)
HCT VFR BLD AUTO: 30.5 % (ref 39–53)
HGB BLD-MCNC: 9.5 G/DL (ref 13–17.5)
IMM GRANULOCYTES # BLD AUTO: 0.04 X10(3) UL (ref 0–1)
IMM GRANULOCYTES NFR BLD: 0.4 %
LYMPHOCYTES # BLD AUTO: 1.62 X10(3) UL (ref 1–4)
LYMPHOCYTES NFR BLD AUTO: 14.7 %
MCH RBC QN AUTO: 33.1 PG (ref 26–34)
MCHC RBC AUTO-ENTMCNC: 31.1 G/DL (ref 31–37)
MCV RBC AUTO: 106.3 FL (ref 80–100)
MONOCYTES # BLD AUTO: 0.83 X10(3) UL (ref 0.1–1)
MONOCYTES NFR BLD AUTO: 7.5 %
NEUTROPHILS # BLD AUTO: 8.15 X10 (3) UL (ref 1.5–7.7)
NEUTROPHILS # BLD AUTO: 8.15 X10(3) UL (ref 1.5–7.7)
NEUTROPHILS NFR BLD AUTO: 73.7 %
OSMOLALITY SERPL CALC.SUM OF ELEC: 292 MOSM/KG (ref 275–295)
PHOSPHATE SERPL-MCNC: 4.2 MG/DL (ref 2.5–4.9)
PLATELET # BLD AUTO: 181 10(3)UL (ref 150–450)
POTASSIUM SERPL-SCNC: 4.3 MMOL/L (ref 3.5–5.1)
RBC # BLD AUTO: 2.87 X10(6)UL (ref 3.8–5.8)
SODIUM SERPL-SCNC: 136 MMOL/L (ref 136–145)
WBC # BLD AUTO: 11.1 X10(3) UL (ref 4–11)

## 2020-09-06 PROCEDURE — 93010 ELECTROCARDIOGRAM REPORT: CPT | Performed by: INTERNAL MEDICINE

## 2020-09-06 PROCEDURE — 82962 GLUCOSE BLOOD TEST: CPT

## 2020-09-06 PROCEDURE — 85025 COMPLETE CBC W/AUTO DIFF WBC: CPT | Performed by: INTERNAL MEDICINE

## 2020-09-06 PROCEDURE — 93005 ELECTROCARDIOGRAM TRACING: CPT

## 2020-09-06 PROCEDURE — 83735 ASSAY OF MAGNESIUM: CPT | Performed by: INTERNAL MEDICINE

## 2020-09-06 PROCEDURE — 80069 RENAL FUNCTION PANEL: CPT | Performed by: INTERNAL MEDICINE

## 2020-09-06 NOTE — PROGRESS NOTES
YESENIA Hospitalist Progress Note     [facility]      SUBJECTIVE:  No acute events overnight  Diarrhea improved  Walked in the treadwell this AM    OBJECTIVE:  Temp:  [97.6 °F (36.4 °C)-99.1 °F (37.3 °C)] 97.6 °F (36.4 °C)  Pulse:  [73-95] 83  Resp:  [15-26] CHEST PA + LAT CHEST (CPT=71046)  INDICATIONS:  hypoxia  COMPARISON:  [facility] , XR, XR CHEST AP PORTABLE  (CPT=71045), 9/03/2020, 5:41 AM.  [facility] , XR, XR CHEST AP PORTABLE  (CPT=71045), 9/01/2020, 10:15 AM.  TECHNIQUE:  PA and lateral chest radiographs we THORACIC AORTA:  Atherosclerosis. CHEST WALL:  No mass or axillary adenopathy.   LIMITED ABDOMEN:  Limited images of the upper abdomen demonstrate cholelithiasis, small hiatal hernia, a small atrophic left kidney, and right renal low-density masses, the lar No sign of acute inflammation. SKULL:             No evidence for fracture or osseous abnormality. OTHER:             Multiple calcifications within the subcutaneous tissues of the scalp noted may be vascular            CONCLUSION:  Chronic changes.   No ac aorta.            CONCLUSION:  Interval development of mixed interstitial and ground-glass opacities throughout the bilateral lungs. Cardiomegaly with mild pulmonary vascular congestion.   The overall findings could be due to pulmonary edema from congestiv g, 15 g, Oral, Q15 Min PRN    Or  Glucose-Vitamin C (DEX-4) chewable tab 4 tablet, 4 tablet, Oral, Q15 Min PRN    Or  dextrose 50 % injection 50 mL, 50 mL, Intravenous, Q15 Min PRN    Or  glucose (DEX4) oral liquid 30 g, 30 g, Oral, Q15 Min PRN    Or  Gluc [facility]  Internal Medicine  [facility]

## 2020-09-06 NOTE — PLAN OF CARE
Dialysis MWF, orders per nephrology  No PRN BP medications at this time, possibly need during night when patient is not out of bed (high SBP last noc)  + orthostatic BP as charted, patient symptomatic  Patient ambulated in treadwell today  Plan for dialysis [facility]

## 2020-09-06 NOTE — PROGRESS NOTES
[facility]    Nephrology Progress Note    Elisa Fisher Attending:  Raymond Porter MD     Cc: ESRD    SUBJECTIVE     Laying comfortably in bed not in acute distress at this time.     PHYSICAL EXAM   Vital signs: BP (!) 172/75   Pulse 74   Te Oral, Q15 Min PRN  amLODIPine Besylate (NORVASC) tab 10 mg, 10 mg, Oral, Daily  atorvastatin (LIPITOR) tab 20 mg, 20 mg, Oral, Nightly  allopurinol (ZYLOPRIM) tab 300 mg, 300 mg, Oral, Daily  multivitamin (DIALYVITE - RENAL) tab 1 tablet, 1 tablet, Oral, D excluded without intravenous contrast.      ADRI:  No mass or adenopathy. MEDIASTINUM:  Multiple scattered nodes may be reactive. CARDIAC:  Coronary artery calcifications. PLEURA:  No mass or effusion. THORACIC AORTA:  Atherosclerosis. There is a normal heart size. There is resolution of the previous     interstitial edema and venous congestion. Decrease in the small left     effusion. No mass or pneumonia. No lymphadenopathy. Stable degenerative disc     disease. (UBW=54813)   Final Result    PROCEDURE:  NM LUNG PERFUSION SCAN ONLY  (CPT=78580)         COMPARISON:  [facility] , XR, XR CHEST AP PORTABLE  (CPT=71045), 9/01/2020,     10:15 AM.         INDICATIONS:  Felt dizzy and weakness with short of breath.          LAWANDA noted may be vascular                               =====    CONCLUSION:  Chronic changes. No acute disease.               Dictated by (CST): Cely Christensen MD on 9/01/2020 at 11:13 AM         Finalized by (CST): Cely Christensen MD on 9/01/2020 at 1 allowing me to participate in the care of this patient. Please do not hesitate to call with questions or concerns.        Grisel Rollins MD  [address] [facility]  Nephrology

## 2020-09-06 NOTE — PLAN OF CARE
Pt received resting in bed visiting with his daughter. Pt is a/o x4 ,denies any complaints of pain. Pt is dyspneic with exertion. O2 sat >97% on room air. Pt remains hypertensive in the 180's. Will discuss with MD in am. HR 70's NSR.  Pt refuses subcutaneou

## 2020-09-06 NOTE — PROGRESS NOTES
Cards    FU: CAD    CORBIN with mild activity. Otherwise no events. Pulm eval and CTS eval.    No CP.     Doxycycline Hyclate (VIBRAMYCIN) cap 100 mg, 100 mg, Oral, 2 times per day  Bismuth Subsalicylate (PEPTO-BISMOL) chew tab CHEW 262 mg, 1 tablet, Ivar Gram GLU 94 09/06/2020    CA 9.3 09/06/2020    ALB 2.9 09/06/2020    MG 2.4 09/06/2020    PHOS 4.2 09/06/2020     CT: Reviewed. Angio: LM disease, occluded RCA. A/P: Severe CAD. 1. CAD: Plan CABG Tuesday. 2. Lungs: CT reviewed. Could be infectious.   An

## 2020-09-06 NOTE — PROGRESS NOTES
09/06/20 0750 09/06/20 0753 09/06/20 0755   Vitals   Pulse 75 80 95   Resp 21 19 23   BP (!) 165/75 116/72 108/56   MAP (mmHg) 100 84 71   BP Location Right arm Right arm Right arm   BP Method Automatic Automatic Automatic   Patient Position Lying Sitti

## 2020-09-07 LAB
ALBUMIN SERPL-MCNC: 3 G/DL (ref 3.4–5)
ANION GAP SERPL CALC-SCNC: 7 MMOL/L (ref 0–18)
ANTIBODY SCREEN: NEGATIVE
BUN BLD-MCNC: 55 MG/DL (ref 7–18)
BUN/CREAT SERPL: 6.3 (ref 10–20)
CALCIUM BLD-MCNC: 9 MG/DL (ref 8.5–10.1)
CHLORIDE SERPL-SCNC: 102 MMOL/L (ref 98–112)
CO2 SERPL-SCNC: 28 MMOL/L (ref 21–32)
CREAT BLD-MCNC: 8.8 MG/DL (ref 0.7–1.3)
GLUCOSE BLD-MCNC: 110 MG/DL (ref 70–99)
GLUCOSE BLD-MCNC: 156 MG/DL (ref 70–99)
GLUCOSE BLD-MCNC: 167 MG/DL (ref 70–99)
GLUCOSE BLD-MCNC: 90 MG/DL (ref 70–99)
GLUCOSE BLD-MCNC: 97 MG/DL (ref 70–99)
HAV IGM SER QL: 2.3 MG/DL (ref 1.6–2.6)
OSMOLALITY SERPL CALC.SUM OF ELEC: 299 MOSM/KG (ref 275–295)
PHOSPHATE SERPL-MCNC: 5 MG/DL (ref 2.5–4.9)
POTASSIUM SERPL-SCNC: 4.5 MMOL/L (ref 3.5–5.1)
RH BLOOD TYPE: POSITIVE
SODIUM SERPL-SCNC: 137 MMOL/L (ref 136–145)

## 2020-09-07 PROCEDURE — 80069 RENAL FUNCTION PANEL: CPT | Performed by: INTERNAL MEDICINE

## 2020-09-07 PROCEDURE — 90935 HEMODIALYSIS ONE EVALUATION: CPT | Performed by: INTERNAL MEDICINE

## 2020-09-07 PROCEDURE — 83735 ASSAY OF MAGNESIUM: CPT | Performed by: INTERNAL MEDICINE

## 2020-09-07 PROCEDURE — 86920 COMPATIBILITY TEST SPIN: CPT

## 2020-09-07 PROCEDURE — 86900 BLOOD TYPING SEROLOGIC ABO: CPT | Performed by: THORACIC SURGERY (CARDIOTHORACIC VASCULAR SURGERY)

## 2020-09-07 PROCEDURE — P9047 ALBUMIN (HUMAN), 25%, 50ML: HCPCS

## 2020-09-07 PROCEDURE — 82962 GLUCOSE BLOOD TEST: CPT

## 2020-09-07 PROCEDURE — 86901 BLOOD TYPING SEROLOGIC RH(D): CPT | Performed by: THORACIC SURGERY (CARDIOTHORACIC VASCULAR SURGERY)

## 2020-09-07 PROCEDURE — 86850 RBC ANTIBODY SCREEN: CPT | Performed by: THORACIC SURGERY (CARDIOTHORACIC VASCULAR SURGERY)

## 2020-09-07 RX ORDER — SODIUM CHLORIDE 9 MG/ML
INJECTION, SOLUTION INTRAVENOUS
Status: COMPLETED | OUTPATIENT
Start: 2020-09-08 | End: 2020-09-07

## 2020-09-07 RX ORDER — CARVEDILOL 6.25 MG/1
6.25 TABLET ORAL 2 TIMES DAILY WITH MEALS
Status: DISCONTINUED | OUTPATIENT
Start: 2020-09-07 | End: 2020-09-08

## 2020-09-07 NOTE — PROGRESS NOTES
Pt awake and alert. Dialysis earlier this morning. 2L taken off. Tolerated well. Up to chair for lunch. Any c/o pain. Plan of care explained.

## 2020-09-07 NOTE — PROGRESS NOTES
[facility]    Nephrology Progress Note    Dorcas Fields Attending:  Pedrito Davies MD     Cc: ESRD    SUBJECTIVE     Seen and examined on HD tolerating well.     PHYSICAL EXAM   Vital signs: BP (!) 163/63   Pulse 74   Temp 97.9 °F (36.6 °C) PRN  amLODIPine Besylate (NORVASC) tab 10 mg, 10 mg, Oral, Daily  atorvastatin (LIPITOR) tab 20 mg, 20 mg, Oral, Nightly  allopurinol (ZYLOPRIM) tab 300 mg, 300 mg, Oral, Daily  multivitamin (DIALYVITE - RENAL) tab 1 tablet, 1 tablet, Oral, Daily  Heparin nodes may be reactive. CARDIAC:  Coronary artery calcifications. PLEURA:  No mass or effusion. THORACIC AORTA:  Atherosclerosis. CHEST WALL:  No mass or axillary adenopathy.       LIMITED ABDOMEN:  Limited images of the upper abdomen demons Decrease in the small left     effusion. No mass or pneumonia. No lymphadenopathy. Stable degenerative disc     disease.                    Dictated by (CST): Cely Christensen MD on 9/04/2020 at 9:39 AM         Finalized by (CST): Cely Christensen XR, XR CHEST AP PORTABLE  (CPT=71045), 9/01/2020,     10:15 AM.         INDICATIONS:  Felt dizzy and weakness with short of breath. TECHNIQUE:  Tc-99m MAA was injected intravenously and images subsequently     obtained.          PHARMACEUTICAL:  4.3 Dictated by (CST): Dorian Rayo MD on 9/01/2020 at 11:13 AM         Finalized by (CST): Dorian Rayo MD on 9/01/2020 at 11:24 AM        XR CHEST AP PORTABLE  (CPT=71045)   Final Result                          =====    PROCEDURE:  XR C

## 2020-09-07 NOTE — PROGRESS NOTES
Cards    FU: CAD    CORBIN with mild activity. Otherwise no events. Pulm eval and CTS eval.    No CP.     Doxycycline Hyclate (VIBRAMYCIN) cap 100 mg, 100 mg, Oral, 2 times per day  Bismuth Subsalicylate (PEPTO-BISMOL) chew tab CHEW 262 mg, 1 tablet, Naomi Hinojosa 09/07/2020     CT: Reviewed. Angio: LM disease, occluded RCA. A/P: Severe CAD. 1. CAD: Plan CABG Tuesday. 2. Lungs: CT reviewed. Could be infectious. Antibiotics per pulm. 3. ESRD: Nephrology following.   HD.  4. HTN: Anti-hypertensives per nephr

## 2020-09-07 NOTE — PLAN OF CARE
Patient care assumed 1900 in bed, NSR with slight ELOISE and hypertensive (SBP in 180s, Mds aware). LUE fistula with present bruit and thrill. No edema, good pulses. Possible S4 heart sound, present systolic murmur.      0100: RN noted possible increased ELOISE o

## 2020-09-07 NOTE — PROGRESS NOTES
YESENIA Hospitalist Progress Note     [facility]      SUBJECTIVE:  Getting HD today  Feeling fine    OBJECTIVE:  Temp:  [97.9 °F (36.6 °C)-99.1 °F (37.3 °C)] 97.9 °F (36.6 °C)  Pulse:  [71-94] 81  Resp:  [14-23] 20  BP: (114-191)/(42-87) 114/66    Intak 9/4/2020  PROCEDURE:  XR CHEST PA + LAT CHEST (CPT=71046)  INDICATIONS:  hypoxia  COMPARISON:  [facility] , XR, XR CHEST AP PORTABLE  (CPT=71045), 9/03/2020, 5:41 AM.  [facility] , XR, XR CHEST AP PORTABLE  (CPT=71045), 9/01/2020, 10:15 AM.  TECHNIQUE:  PA and lat PLEURA:  No mass or effusion. THORACIC AORTA:  Atherosclerosis. CHEST WALL:  No mass or axillary adenopathy.   LIMITED ABDOMEN:  Limited images of the upper abdomen demonstrate cholelithiasis, small hiatal hernia, a small atrophic left kidney, and right re sinusitis. MASTOIDS:          No sign of acute inflammation. SKULL:             No evidence for fracture or osseous abnormality.  OTHER:             Multiple calcifications within the subcutaneous tissues of the scalp noted may be vascular            CONCL plaque in the tortuous thoracic aorta. CONCLUSION:  Interval development of mixed interstitial and ground-glass opacities throughout the bilateral lungs. Cardiomegaly with mild pulmonary vascular congestion.   The overall findings could be due t PRN  glucose (DEX4) oral liquid 15 g, 15 g, Oral, Q15 Min PRN    Or  Glucose-Vitamin C (DEX-4) chewable tab 4 tablet, 4 tablet, Oral, Q15 Min PRN    Or  dextrose 50 % injection 50 mL, 50 mL, Intravenous, Q15 Min PRN    Or  glucose (DEX4) oral liquid 30 g, subQ    Dispo: inpatient    Updated patient and wife at [address] [address]  Internal Medicine  [place]

## 2020-09-07 NOTE — PROGRESS NOTES
[facility] Patient Status:  Inpatient    1935 MRN [id number]   [facility] 6NE-A Attending Dylan Leal MD   [address] Day # 6 PCP Sylvia Robertson MD     Critical Care Progress Note     Date of Admission: 2020 (1.753 m)   Wt 183 lb 13.8 oz (83.4 kg)   SpO2 99%   BMI 27.15 kg/m²      Ventilator Settings: RA      Wt Readings from Last 3 Encounters:  09/06/20 : 183 lb 13.8 oz (83.4 kg)  08/20/20 : 193 lb (87.5 kg)  08/11/20 : 198 lb 1.6 oz (89.9 kg)       I/O last contraindication to any planned coronary intervention.    2. Dispo  -will follow     Gil Pérez MD  9/7/2020  11:42 AM

## 2020-09-08 ENCOUNTER — APPOINTMENT (OUTPATIENT)
Dept: GENERAL RADIOLOGY | Facility: HOSPITAL | Age: 85
DRG: 233 | End: 2020-09-08
Attending: PHYSICIAN ASSISTANT
Payer: MEDICARE

## 2020-09-08 LAB
ALBUMIN SERPL-MCNC: 2.9 G/DL (ref 3.4–5)
ANION GAP SERPL CALC-SCNC: 5 MMOL/L (ref 0–18)
APTT PPP: 30.8 SECONDS (ref 25.4–36.1)
ARTERIAL BLD GAS O2 SATURATION: 96 % (ref 92–100)
ARTERIAL BLD GAS O2 SATURATION: 96 % (ref 92–100)
ARTERIAL BLOOD GAS BASE EXCESS: -1 MMOL/L (ref ?–2)
ARTERIAL BLOOD GAS BASE EXCESS: 0.8 MMOL/L (ref ?–2)
ARTERIAL BLOOD GAS HCO3: 24.2 MEQ/L (ref 22–26)
ARTERIAL BLOOD GAS HCO3: 25.3 MEQ/L (ref 22–26)
ARTERIAL BLOOD GAS PCO2: 39 MM HG (ref 35–45)
ARTERIAL BLOOD GAS PCO2: 42 MM HG (ref 35–45)
ARTERIAL BLOOD GAS PH: 7.38 (ref 7.35–7.45)
ARTERIAL BLOOD GAS PH: 7.43 (ref 7.35–7.45)
ARTERIAL BLOOD GAS PO2: 105 MM HG (ref 80–105)
ARTERIAL BLOOD GAS PO2: 115 MM HG (ref 80–105)
ATRIAL RATE: 60 BPM
ATRIAL RATE: 81 BPM
BASOPHILS # BLD AUTO: 0.08 X10(3) UL (ref 0–0.2)
BASOPHILS # BLD AUTO: 0.08 X10(3) UL (ref 0–0.2)
BASOPHILS NFR BLD AUTO: 0.3 %
BASOPHILS NFR BLD AUTO: 0.9 %
BUN BLD-MCNC: 30 MG/DL (ref 7–18)
BUN BLD-MCNC: 31 MG/DL (ref 7–18)
BUN/CREAT SERPL: 5.2 (ref 10–20)
CALCIUM BLD-MCNC: 8.9 MG/DL (ref 8.5–10.1)
CALCIUM BLD-MCNC: 9.3 MG/DL (ref 8.5–10.1)
CALCULATED O2 SATURATION: 98 % (ref 92–100)
CALCULATED O2 SATURATION: 98 % (ref 92–100)
CARBOXYHEMOGLOBIN: 1.7 % SAT (ref 0–3)
CARBOXYHEMOGLOBIN: 1.8 % SAT (ref 0–3)
CHLORIDE SERPL-SCNC: 102 MMOL/L (ref 98–112)
CHLORIDE SERPL-SCNC: 104 MMOL/L (ref 98–112)
CO2 SERPL-SCNC: 28 MMOL/L (ref 21–32)
CO2 SERPL-SCNC: 30 MMOL/L (ref 21–32)
CREAT BLD-MCNC: 5.71 MG/DL (ref 0.7–1.3)
CREAT BLD-MCNC: 5.78 MG/DL (ref 0.7–1.3)
DEPRECATED RDW RBC AUTO: 54.1 FL (ref 35.1–46.3)
DEPRECATED RDW RBC AUTO: 64.7 FL (ref 35.1–46.3)
EOSINOPHIL # BLD AUTO: 0.13 X10(3) UL (ref 0–0.7)
EOSINOPHIL # BLD AUTO: 0.35 X10(3) UL (ref 0–0.7)
EOSINOPHIL NFR BLD AUTO: 0.4 %
EOSINOPHIL NFR BLD AUTO: 3.7 %
ERYTHROCYTE [DISTWIDTH] IN BLOOD BY AUTOMATED COUNT: 14.2 % (ref 11–15)
ERYTHROCYTE [DISTWIDTH] IN BLOOD BY AUTOMATED COUNT: 17.2 % (ref 11–15)
FIBRINOGEN: 355 MG/DL (ref 200–446)
FIO2: 40 %
FIO2: 50 %
GLUCOSE BLD-MCNC: 103 MG/DL (ref 70–99)
GLUCOSE BLD-MCNC: 107 MG/DL (ref 70–99)
GLUCOSE BLD-MCNC: 107 MG/DL (ref 70–99)
GLUCOSE BLD-MCNC: 113 MG/DL (ref 70–99)
GLUCOSE BLD-MCNC: 113 MG/DL (ref 70–99)
GLUCOSE BLD-MCNC: 114 MG/DL (ref 70–99)
GLUCOSE BLD-MCNC: 119 MG/DL (ref 70–99)
GLUCOSE BLD-MCNC: 125 MG/DL (ref 70–99)
GLUCOSE BLD-MCNC: 127 MG/DL (ref 70–99)
GLUCOSE BLD-MCNC: 128 MG/DL (ref 70–99)
GLUCOSE BLD-MCNC: 132 MG/DL (ref 70–99)
GLUCOSE BLD-MCNC: 152 MG/DL (ref 70–99)
GLUCOSE BLD-MCNC: 156 MG/DL (ref 70–99)
GLUCOSE BLD-MCNC: 157 MG/DL (ref 70–99)
GLUCOSE BLD-MCNC: 157 MG/DL (ref 70–99)
GLUCOSE BLD-MCNC: 158 MG/DL (ref 70–99)
GLUCOSE BLD-MCNC: 86 MG/DL (ref 70–99)
GLUCOSE BLD-MCNC: 88 MG/DL (ref 70–99)
GLUCOSE BLD-MCNC: 96 MG/DL (ref 70–99)
HAV IGM SER QL: 2.1 MG/DL (ref 1.6–2.6)
HAV IGM SER QL: 2.7 MG/DL (ref 1.6–2.6)
HCT VFR BLD AUTO: 27.6 % (ref 39–53)
HCT VFR BLD AUTO: 29.7 % (ref 39–53)
HGB BLD-MCNC: 8.8 G/DL (ref 13–17.5)
HGB BLD-MCNC: 9.5 G/DL (ref 13–17.5)
IMM GRANULOCYTES # BLD AUTO: 0.03 X10(3) UL (ref 0–1)
IMM GRANULOCYTES # BLD AUTO: 0.39 X10(3) UL (ref 0–1)
IMM GRANULOCYTES NFR BLD: 0.3 %
IMM GRANULOCYTES NFR BLD: 1.2 %
INR BLD: 1.16 (ref 0.89–1.11)
INR BLD: 1.43 (ref 0.89–1.11)
IONIZED CALCIUM: 1.24 MMOL/L (ref 1.12–1.32)
ISTAT ACTIVATED CLOTTING TIME: 103 SECONDS (ref 74–137)
ISTAT ACTIVATED CLOTTING TIME: 125 SECONDS (ref 74–137)
ISTAT ACTIVATED CLOTTING TIME: 351 SECONDS (ref 74–137)
ISTAT ACTIVATED CLOTTING TIME: 362 SECONDS (ref 74–137)
ISTAT ACTIVATED CLOTTING TIME: 450 SECONDS (ref 74–137)
ISTAT ACTIVATED CLOTTING TIME: 516 SECONDS (ref 74–137)
ISTAT BLOOD GAS BASE EXCESS: -1 MMOL/L (ref ?–30)
ISTAT BLOOD GAS BASE EXCESS: -2 MMOL/L (ref ?–30)
ISTAT BLOOD GAS BASE EXCESS: -6 MMOL/L (ref ?–30)
ISTAT BLOOD GAS BASE EXCESS: 1 MMOL/L (ref ?–30)
ISTAT BLOOD GAS BASE EXCESS: 2 MMOL/L (ref ?–30)
ISTAT BLOOD GAS BASE EXCESS: 2 MMOL/L (ref ?–30)
ISTAT BLOOD GAS BASE EXCESS: 7 MMOL/L (ref ?–30)
ISTAT BLOOD GAS FIO2: 50 %
ISTAT BLOOD GAS HCO3: 19.1 MEQ/L (ref 22–26)
ISTAT BLOOD GAS HCO3: 23.9 MEQ/L (ref 22–26)
ISTAT BLOOD GAS HCO3: 24.3 MEQ/L (ref 22–26)
ISTAT BLOOD GAS HCO3: 24.5 MEQ/L (ref 22–26)
ISTAT BLOOD GAS HCO3: 24.7 MEQ/L (ref 22–26)
ISTAT BLOOD GAS HCO3: 26 MEQ/L (ref 22–26)
ISTAT BLOOD GAS HCO3: 26 MEQ/L (ref 22–26)
ISTAT BLOOD GAS HCO3: 26.7 MEQ/L (ref 22–26)
ISTAT BLOOD GAS HCO3: 31.5 MEQ/L (ref 22–26)
ISTAT BLOOD GAS O2 SATURATION: 100 % (ref 92–100)
ISTAT BLOOD GAS O2 SATURATION: 74 % (ref 60–85)
ISTAT BLOOD GAS O2 SATURATION: 74 % (ref 60–85)
ISTAT BLOOD GAS O2 SATURATION: 81 % (ref 60–85)
ISTAT BLOOD GAS O2 SATURATION: 82 % (ref 60–85)
ISTAT BLOOD GAS O2 SATURATION: 97 % (ref 92–100)
ISTAT BLOOD GAS PCO2: 34.2 MMHG (ref 35–45)
ISTAT BLOOD GAS PCO2: 36.7 MMHG (ref 38–50)
ISTAT BLOOD GAS PCO2: 39.3 MMHG (ref 38–50)
ISTAT BLOOD GAS PCO2: 43.5 MMHG (ref 35–45)
ISTAT BLOOD GAS PCO2: 43.8 MMHG (ref 38–50)
ISTAT BLOOD GAS PCO2: 45.9 MMHG (ref 35–45)
ISTAT BLOOD GAS PCO2: 48.5 MMHG (ref 35–45)
ISTAT BLOOD GAS PCO2: 48.7 MMHG (ref 35–45)
ISTAT BLOOD GAS PCO2: 51.1 MMHG (ref 38–50)
ISTAT BLOOD GAS PH: 7.29 (ref 7.32–7.43)
ISTAT BLOOD GAS PH: 7.31 (ref 7.35–7.45)
ISTAT BLOOD GAS PH: 7.36 (ref 7.32–7.43)
ISTAT BLOOD GAS PH: 7.36 (ref 7.35–7.45)
ISTAT BLOOD GAS PH: 7.36 (ref 7.35–7.45)
ISTAT BLOOD GAS PH: 7.4 (ref 7.35–7.45)
ISTAT BLOOD GAS PH: 7.42 (ref 7.32–7.43)
ISTAT BLOOD GAS PH: 7.42 (ref 7.35–7.45)
ISTAT BLOOD GAS PH: 7.43 (ref 7.32–7.43)
ISTAT BLOOD GAS PO2: 226 MMHG (ref 80–105)
ISTAT BLOOD GAS PO2: 289 MMHG (ref 80–105)
ISTAT BLOOD GAS PO2: 317 MMHG (ref 80–105)
ISTAT BLOOD GAS PO2: 99 MMHG (ref 80–105)
ISTAT BLOOD GAS PO2: <70 MMHG (ref 35–40)
ISTAT BLOOD GAS PO2: >400 MMHG (ref 80–105)
ISTAT BLOOD GAS TCO2: 20 MMOL/L (ref 22–32)
ISTAT BLOOD GAS TCO2: 25 MMOL/L (ref 22–32)
ISTAT BLOOD GAS TCO2: 26 MMOL/L (ref 22–32)
ISTAT BLOOD GAS TCO2: 27 MMOL/L (ref 22–32)
ISTAT BLOOD GAS TCO2: 27 MMOL/L (ref 22–32)
ISTAT BLOOD GAS TCO2: 28 MMOL/L (ref 22–32)
ISTAT BLOOD GAS TCO2: 33 MMOL/L (ref 22–32)
ISTAT HEMATOCRIT: 20 %
ISTAT HEMATOCRIT: 20 %
ISTAT HEMATOCRIT: 22 %
ISTAT HEMATOCRIT: 23 %
ISTAT HEMATOCRIT: 24 %
ISTAT HEMATOCRIT: 26 %
ISTAT HEMATOCRIT: 28 %
ISTAT IONIZED CALCIUM: 1.04 MMOL/L (ref 1.12–1.32)
ISTAT IONIZED CALCIUM: 1.11 MMOL/L (ref 1.12–1.32)
ISTAT IONIZED CALCIUM: 1.13 MMOL/L (ref 1.12–1.32)
ISTAT IONIZED CALCIUM: 1.13 MMOL/L (ref 1.12–1.32)
ISTAT IONIZED CALCIUM: 1.2 MMOL/L (ref 1.12–1.32)
ISTAT IONIZED CALCIUM: 1.24 MMOL/L (ref 1.12–1.32)
ISTAT IONIZED CALCIUM: 1.24 MMOL/L (ref 1.12–1.32)
ISTAT IONIZED CALCIUM: 1.29 MMOL/L (ref 1.12–1.32)
ISTAT IONIZED CALCIUM: 1.39 MMOL/L (ref 1.12–1.32)
ISTAT POTASSIUM: 4 MMOL/L (ref 3.6–5.1)
ISTAT POTASSIUM: 4.7 MMOL/L (ref 3.6–5.1)
ISTAT POTASSIUM: 5.4 MMOL/L (ref 3.6–5.1)
ISTAT POTASSIUM: 5.5 MMOL/L (ref 3.6–5.1)
ISTAT POTASSIUM: 6.5 MMOL/L (ref 3.6–5.1)
ISTAT POTASSIUM: 6.9 MMOL/L (ref 3.6–5.1)
ISTAT POTASSIUM: 7.6 MMOL/L (ref 3.6–5.1)
ISTAT POTASSIUM: >7.9 MMOL/L (ref 3.6–5.1)
ISTAT POTASSIUM: >7.9 MMOL/L (ref 3.6–5.1)
ISTAT SODIUM: 130 MMOL/L (ref 136–145)
ISTAT SODIUM: 132 MMOL/L (ref 136–145)
ISTAT SODIUM: 134 MMOL/L (ref 136–145)
ISTAT SODIUM: 136 MMOL/L (ref 136–145)
ISTAT SODIUM: 137 MMOL/L (ref 136–145)
ISTAT SODIUM: 138 MMOL/L (ref 136–145)
ISTAT SODIUM: 142 MMOL/L (ref 136–145)
LACTIC ACID ARTERIAL: <1.6 MMOL/L (ref 0.5–2)
LYMPHOCYTES # BLD AUTO: 1.26 X10(3) UL (ref 1–4)
LYMPHOCYTES # BLD AUTO: 1.72 X10(3) UL (ref 1–4)
LYMPHOCYTES NFR BLD AUTO: 18.4 %
LYMPHOCYTES NFR BLD AUTO: 3.9 %
MCH RBC QN AUTO: 32.6 PG (ref 26–34)
MCH RBC QN AUTO: 33.7 PG (ref 26–34)
MCHC RBC AUTO-ENTMCNC: 31.9 G/DL (ref 31–37)
MCHC RBC AUTO-ENTMCNC: 32 G/DL (ref 31–37)
MCV RBC AUTO: 102.1 FL (ref 80–100)
MCV RBC AUTO: 105.7 FL (ref 80–100)
METHEMOGLOBIN: 0.5 % SAT (ref 0.4–1.5)
METHEMOGLOBIN: 0.5 % SAT (ref 0.4–1.5)
MONOCYTES # BLD AUTO: 0.75 X10(3) UL (ref 0.1–1)
MONOCYTES # BLD AUTO: 1.33 X10(3) UL (ref 0.1–1)
MONOCYTES NFR BLD AUTO: 4.2 %
MONOCYTES NFR BLD AUTO: 8 %
NEUTROPHILS # BLD AUTO: 28.81 X10 (3) UL (ref 1.5–7.7)
NEUTROPHILS # BLD AUTO: 28.81 X10(3) UL (ref 1.5–7.7)
NEUTROPHILS # BLD AUTO: 6.44 X10 (3) UL (ref 1.5–7.7)
NEUTROPHILS # BLD AUTO: 6.44 X10(3) UL (ref 1.5–7.7)
NEUTROPHILS NFR BLD AUTO: 68.7 %
NEUTROPHILS NFR BLD AUTO: 90 %
OSMOLALITY SERPL CALC.SUM OF ELEC: 290 MOSM/KG (ref 275–295)
P AXIS: 56 DEGREES
P AXIS: 61 DEGREES
P-R INTERVAL: 174 MS
P-R INTERVAL: 206 MS
P/F RATIO: 233.3 MMHG
P/F RATIO: 272.9 MMHG
PATIENT TEMPERATURE: 97.2 F
PATIENT TEMPERATURE: 98 F
PEEP: 5 CM H2O
PEEP: 5 CM H2O
PHOSPHATE SERPL-MCNC: 3.8 MG/DL (ref 2.5–4.9)
PLATELET # BLD AUTO: 158 10(3)UL (ref 150–450)
PLATELET # BLD AUTO: 180 10(3)UL (ref 150–450)
POTASSIUM BLOOD GAS: 5.9 MMOL/L (ref 3.6–5.1)
POTASSIUM SERPL-SCNC: 4.6 MMOL/L (ref 3.5–5.1)
POTASSIUM SERPL-SCNC: 4.9 MMOL/L (ref 3.5–5.1)
POTASSIUM SERPL-SCNC: 5.5 MMOL/L (ref 3.5–5.1)
POTASSIUM SERPL-SCNC: 6 MMOL/L (ref 3.5–5.1)
PSA SERPL DL<=0.01 NG/ML-MCNC: 15.2 SECONDS (ref 12.4–14.6)
PSA SERPL DL<=0.01 NG/ML-MCNC: 17.9 SECONDS (ref 12.4–14.6)
Q-T INTERVAL: 440 MS
Q-T INTERVAL: 492 MS
QRS DURATION: 142 MS
QRS DURATION: 152 MS
QTC CALCULATION (BEZET): 492 MS
QTC CALCULATION (BEZET): 511 MS
R AXIS: -55 DEGREES
R AXIS: -9 DEGREES
RBC # BLD AUTO: 2.61 X10(6)UL (ref 3.8–5.8)
RBC # BLD AUTO: 2.91 X10(6)UL (ref 3.8–5.8)
SODIUM BLOOD GAS: 136 MMOL/L (ref 136–144)
SODIUM SERPL-SCNC: 136 MMOL/L (ref 136–145)
SODIUM SERPL-SCNC: 137 MMOL/L (ref 136–145)
T AXIS: 21 DEGREES
T AXIS: 93 DEGREES
TIDAL VOLUME: 500 ML
TIDAL VOLUME: 500 ML
TOTAL HEMOGLOBIN: 10.1 G/DL (ref 12.6–17.4)
TOTAL HEMOGLOBIN: 9 G/DL (ref 12.6–17.4)
VENT RATE: 13 /MIN
VENT RATE: 13 /MIN
VENTRICULAR RATE: 60 BPM
VENTRICULAR RATE: 81 BPM
WBC # BLD AUTO: 32 X10(3) UL (ref 4–11)
WBC # BLD AUTO: 9.4 X10(3) UL (ref 4–11)

## 2020-09-08 PROCEDURE — 84132 ASSAY OF SERUM POTASSIUM: CPT

## 2020-09-08 PROCEDURE — 83050 HGB METHEMOGLOBIN QUAN: CPT | Performed by: INTERNAL MEDICINE

## 2020-09-08 PROCEDURE — 88342 IMHCHEM/IMCYTCHM 1ST ANTB: CPT | Performed by: THORACIC SURGERY (CARDIOTHORACIC VASCULAR SURGERY)

## 2020-09-08 PROCEDURE — P9045 ALBUMIN (HUMAN), 5%, 250 ML: HCPCS | Performed by: THORACIC SURGERY (CARDIOTHORACIC VASCULAR SURGERY)

## 2020-09-08 PROCEDURE — 82375 ASSAY CARBOXYHB QUANT: CPT | Performed by: INTERNAL MEDICINE

## 2020-09-08 PROCEDURE — 82803 BLOOD GASES ANY COMBINATION: CPT

## 2020-09-08 PROCEDURE — 76942 ECHO GUIDE FOR BIOPSY: CPT | Performed by: INTERNAL MEDICINE

## 2020-09-08 PROCEDURE — 85610 PROTHROMBIN TIME: CPT | Performed by: INTERNAL MEDICINE

## 2020-09-08 PROCEDURE — 84295 ASSAY OF SERUM SODIUM: CPT | Performed by: INTERNAL MEDICINE

## 2020-09-08 PROCEDURE — 0BH18EZ INSERTION OF ENDOTRACHEAL AIRWAY INTO TRACHEA, VIA NATURAL OR ARTIFICIAL OPENING ENDOSCOPIC: ICD-10-PCS | Performed by: INTERNAL MEDICINE

## 2020-09-08 PROCEDURE — 85025 COMPLETE CBC W/AUTO DIFF WBC: CPT | Performed by: INTERNAL MEDICINE

## 2020-09-08 PROCEDURE — 82803 BLOOD GASES ANY COMBINATION: CPT | Performed by: INTERNAL MEDICINE

## 2020-09-08 PROCEDURE — 84132 ASSAY OF SERUM POTASSIUM: CPT | Performed by: INTERNAL MEDICINE

## 2020-09-08 PROCEDURE — S0028 INJECTION, FAMOTIDINE, 20 MG: HCPCS | Performed by: INTERNAL MEDICINE

## 2020-09-08 PROCEDURE — 30233N1 TRANSFUSION OF NONAUTOLOGOUS RED BLOOD CELLS INTO PERIPHERAL VEIN, PERCUTANEOUS APPROACH: ICD-10-PCS | Performed by: THORACIC SURGERY (CARDIOTHORACIC VASCULAR SURGERY)

## 2020-09-08 PROCEDURE — 5A1935Z RESPIRATORY VENTILATION, LESS THAN 24 CONSECUTIVE HOURS: ICD-10-PCS | Performed by: INTERNAL MEDICINE

## 2020-09-08 PROCEDURE — 82330 ASSAY OF CALCIUM: CPT

## 2020-09-08 PROCEDURE — 88341 IMHCHEM/IMCYTCHM EA ADD ANTB: CPT | Performed by: THORACIC SURGERY (CARDIOTHORACIC VASCULAR SURGERY)

## 2020-09-08 PROCEDURE — 83735 ASSAY OF MAGNESIUM: CPT | Performed by: PHYSICIAN ASSISTANT

## 2020-09-08 PROCEDURE — 84295 ASSAY OF SERUM SODIUM: CPT

## 2020-09-08 PROCEDURE — 83605 ASSAY OF LACTIC ACID: CPT | Performed by: INTERNAL MEDICINE

## 2020-09-08 PROCEDURE — 71045 X-RAY EXAM CHEST 1 VIEW: CPT | Performed by: PHYSICIAN ASSISTANT

## 2020-09-08 PROCEDURE — 82962 GLUCOSE BLOOD TEST: CPT

## 2020-09-08 PROCEDURE — 5A1221Z PERFORMANCE OF CARDIAC OUTPUT, CONTINUOUS: ICD-10-PCS | Performed by: THORACIC SURGERY (CARDIOTHORACIC VASCULAR SURGERY)

## 2020-09-08 PROCEDURE — 85025 COMPLETE CBC W/AUTO DIFF WBC: CPT | Performed by: THORACIC SURGERY (CARDIOTHORACIC VASCULAR SURGERY)

## 2020-09-08 PROCEDURE — 85347 COAGULATION TIME ACTIVATED: CPT

## 2020-09-08 PROCEDURE — 85014 HEMATOCRIT: CPT

## 2020-09-08 PROCEDURE — 90935 HEMODIALYSIS ONE EVALUATION: CPT | Performed by: INTERNAL MEDICINE

## 2020-09-08 PROCEDURE — 85384 FIBRINOGEN ACTIVITY: CPT | Performed by: PHYSICIAN ASSISTANT

## 2020-09-08 PROCEDURE — 02100Z9 BYPASS CORONARY ARTERY, ONE ARTERY FROM LEFT INTERNAL MAMMARY, OPEN APPROACH: ICD-10-PCS | Performed by: THORACIC SURGERY (CARDIOTHORACIC VASCULAR SURGERY)

## 2020-09-08 PROCEDURE — 021109W BYPASS CORONARY ARTERY, TWO ARTERIES FROM AORTA WITH AUTOLOGOUS VENOUS TISSUE, OPEN APPROACH: ICD-10-PCS | Performed by: THORACIC SURGERY (CARDIOTHORACIC VASCULAR SURGERY)

## 2020-09-08 PROCEDURE — 84132 ASSAY OF SERUM POTASSIUM: CPT | Performed by: THORACIC SURGERY (CARDIOTHORACIC VASCULAR SURGERY)

## 2020-09-08 PROCEDURE — 82330 ASSAY OF CALCIUM: CPT | Performed by: INTERNAL MEDICINE

## 2020-09-08 PROCEDURE — 85610 PROTHROMBIN TIME: CPT | Performed by: PHYSICIAN ASSISTANT

## 2020-09-08 PROCEDURE — 93010 ELECTROCARDIOGRAM REPORT: CPT | Performed by: INTERNAL MEDICINE

## 2020-09-08 PROCEDURE — 85018 HEMOGLOBIN: CPT | Performed by: INTERNAL MEDICINE

## 2020-09-08 PROCEDURE — 93005 ELECTROCARDIOGRAM TRACING: CPT

## 2020-09-08 PROCEDURE — 93312 ECHO TRANSESOPHAGEAL: CPT | Performed by: INTERNAL MEDICINE

## 2020-09-08 PROCEDURE — 88305 TISSUE EXAM BY PATHOLOGIST: CPT | Performed by: THORACIC SURGERY (CARDIOTHORACIC VASCULAR SURGERY)

## 2020-09-08 PROCEDURE — 83735 ASSAY OF MAGNESIUM: CPT | Performed by: INTERNAL MEDICINE

## 2020-09-08 PROCEDURE — 80069 RENAL FUNCTION PANEL: CPT | Performed by: INTERNAL MEDICINE

## 2020-09-08 PROCEDURE — 06BQ4ZZ EXCISION OF LEFT SAPHENOUS VEIN, PERCUTANEOUS ENDOSCOPIC APPROACH: ICD-10-PCS | Performed by: THORACIC SURGERY (CARDIOTHORACIC VASCULAR SURGERY)

## 2020-09-08 PROCEDURE — 80048 BASIC METABOLIC PNL TOTAL CA: CPT | Performed by: PHYSICIAN ASSISTANT

## 2020-09-08 PROCEDURE — 36430 TRANSFUSION BLD/BLD COMPNT: CPT | Performed by: INTERNAL MEDICINE

## 2020-09-08 PROCEDURE — 94002 VENT MGMT INPAT INIT DAY: CPT

## 2020-09-08 PROCEDURE — 85730 THROMBOPLASTIN TIME PARTIAL: CPT | Performed by: PHYSICIAN ASSISTANT

## 2020-09-08 DEVICE — IMPLANTABLE DEVICE
Type: IMPLANTABLE DEVICE | Site: CHEST | Status: FUNCTIONAL
Brand: STERNALOCK® BLU SYSTEM

## 2020-09-08 RX ORDER — IPRATROPIUM BROMIDE AND ALBUTEROL SULFATE 2.5; .5 MG/3ML; MG/3ML
3 SOLUTION RESPIRATORY (INHALATION) EVERY 4 HOURS PRN
Status: DISCONTINUED | OUTPATIENT
Start: 2020-09-08 | End: 2020-09-09

## 2020-09-08 RX ORDER — DEXMEDETOMIDINE HYDROCHLORIDE 4 UG/ML
INJECTION, SOLUTION INTRAVENOUS CONTINUOUS
Status: DISCONTINUED | OUTPATIENT
Start: 2020-09-08 | End: 2020-09-10

## 2020-09-08 RX ORDER — LEVOFLOXACIN 5 MG/ML
INJECTION, SOLUTION INTRAVENOUS AS NEEDED
Status: DISCONTINUED | OUTPATIENT
Start: 2020-09-08 | End: 2020-09-08 | Stop reason: SURG

## 2020-09-08 RX ORDER — ROCURONIUM BROMIDE 10 MG/ML
INJECTION, SOLUTION INTRAVENOUS AS NEEDED
Status: DISCONTINUED | OUTPATIENT
Start: 2020-09-08 | End: 2020-09-08 | Stop reason: SURG

## 2020-09-08 RX ORDER — POLYETHYLENE GLYCOL 3350 17 G/17G
17 POWDER, FOR SOLUTION ORAL DAILY PRN
Status: DISCONTINUED | OUTPATIENT
Start: 2020-09-08 | End: 2020-09-14

## 2020-09-08 RX ORDER — CHLORHEXIDINE GLUCONATE 0.12 MG/ML
15 RINSE ORAL
Status: DISCONTINUED | OUTPATIENT
Start: 2020-09-08 | End: 2020-09-09

## 2020-09-08 RX ORDER — NITROGLYCERIN 20 MG/100ML
INJECTION INTRAVENOUS CONTINUOUS PRN
Status: DISCONTINUED | OUTPATIENT
Start: 2020-09-08 | End: 2020-09-08 | Stop reason: SURG

## 2020-09-08 RX ORDER — MORPHINE SULFATE 2 MG/ML
4 INJECTION, SOLUTION INTRAMUSCULAR; INTRAVENOUS EVERY 2 HOUR PRN
Status: DISCONTINUED | OUTPATIENT
Start: 2020-09-08 | End: 2020-09-14

## 2020-09-08 RX ORDER — LIDOCAINE HYDROCHLORIDE 10 MG/ML
INJECTION, SOLUTION EPIDURAL; INFILTRATION; INTRACAUDAL; PERINEURAL AS NEEDED
Status: DISCONTINUED | OUTPATIENT
Start: 2020-09-08 | End: 2020-09-08 | Stop reason: SURG

## 2020-09-08 RX ORDER — DOBUTAMINE HYDROCHLORIDE 200 MG/100ML
INJECTION INTRAVENOUS CONTINUOUS PRN
Status: DISCONTINUED | OUTPATIENT
Start: 2020-09-08 | End: 2020-09-14

## 2020-09-08 RX ORDER — MIDAZOLAM HYDROCHLORIDE 1 MG/ML
1 INJECTION INTRAMUSCULAR; INTRAVENOUS EVERY 30 MIN PRN
Status: DISCONTINUED | OUTPATIENT
Start: 2020-09-08 | End: 2020-09-14

## 2020-09-08 RX ORDER — ASPIRIN 300 MG
300 SUPPOSITORY, RECTAL RECTAL DAILY
Status: DISCONTINUED | OUTPATIENT
Start: 2020-09-09 | End: 2020-09-14

## 2020-09-08 RX ORDER — PANTOPRAZOLE SODIUM 40 MG/1
40 TABLET, DELAYED RELEASE ORAL
Status: DISCONTINUED | OUTPATIENT
Start: 2020-09-09 | End: 2020-09-14

## 2020-09-08 RX ORDER — DEXTROSE MONOHYDRATE 25 G/50ML
50 INJECTION, SOLUTION INTRAVENOUS
Status: DISCONTINUED | OUTPATIENT
Start: 2020-09-08 | End: 2020-09-14

## 2020-09-08 RX ORDER — SODIUM CHLORIDE 9 MG/ML
INJECTION, SOLUTION INTRAVENOUS CONTINUOUS
Status: DISCONTINUED | OUTPATIENT
Start: 2020-09-08 | End: 2020-09-14

## 2020-09-08 RX ORDER — DOBUTAMINE HYDROCHLORIDE 200 MG/100ML
INJECTION INTRAVENOUS CONTINUOUS PRN
Status: DISCONTINUED | OUTPATIENT
Start: 2020-09-08 | End: 2020-09-08 | Stop reason: SURG

## 2020-09-08 RX ORDER — DEXMEDETOMIDINE HYDROCHLORIDE 4 UG/ML
INJECTION, SOLUTION INTRAVENOUS CONTINUOUS PRN
Status: DISCONTINUED | OUTPATIENT
Start: 2020-09-08 | End: 2020-09-08 | Stop reason: SURG

## 2020-09-08 RX ORDER — NITROGLYCERIN 20 MG/100ML
INJECTION INTRAVENOUS CONTINUOUS PRN
Status: DISCONTINUED | OUTPATIENT
Start: 2020-09-08 | End: 2020-09-14

## 2020-09-08 RX ORDER — HYDROCODONE BITARTRATE AND ACETAMINOPHEN 10; 325 MG/1; MG/1
2 TABLET ORAL EVERY 4 HOURS PRN
Status: DISCONTINUED | OUTPATIENT
Start: 2020-09-08 | End: 2020-09-14

## 2020-09-08 RX ORDER — BISACODYL 10 MG
10 SUPPOSITORY, RECTAL RECTAL
Status: DISCONTINUED | OUTPATIENT
Start: 2020-09-08 | End: 2020-09-14

## 2020-09-08 RX ORDER — VANCOMYCIN HYDROCHLORIDE 1 G/20ML
INJECTION, POWDER, LYOPHILIZED, FOR SOLUTION INTRAVENOUS AS NEEDED
Status: DISCONTINUED | OUTPATIENT
Start: 2020-09-08 | End: 2020-09-08 | Stop reason: SURG

## 2020-09-08 RX ORDER — DIPHENHYDRAMINE HYDROCHLORIDE 50 MG/ML
INJECTION INTRAMUSCULAR; INTRAVENOUS AS NEEDED
Status: DISCONTINUED | OUTPATIENT
Start: 2020-09-08 | End: 2020-09-08 | Stop reason: SURG

## 2020-09-08 RX ORDER — PHENYLEPHRINE HCL 10 MG/ML
VIAL (ML) INJECTION AS NEEDED
Status: DISCONTINUED | OUTPATIENT
Start: 2020-09-08 | End: 2020-09-08 | Stop reason: SURG

## 2020-09-08 RX ORDER — HEPARIN SODIUM 1000 [USP'U]/ML
INJECTION, SOLUTION INTRAVENOUS; SUBCUTANEOUS AS NEEDED
Status: DISCONTINUED | OUTPATIENT
Start: 2020-09-08 | End: 2020-09-08 | Stop reason: SURG

## 2020-09-08 RX ORDER — FAMOTIDINE 10 MG/ML
INJECTION, SOLUTION INTRAVENOUS AS NEEDED
Status: DISCONTINUED | OUTPATIENT
Start: 2020-09-08 | End: 2020-09-08 | Stop reason: SURG

## 2020-09-08 RX ORDER — ALBUMIN, HUMAN INJ 5% 5 %
250 SOLUTION INTRAVENOUS ONCE AS NEEDED
Status: DISCONTINUED | OUTPATIENT
Start: 2020-09-08 | End: 2020-09-14

## 2020-09-08 RX ORDER — DEXTROSE AND SODIUM CHLORIDE 5; .45 G/100ML; G/100ML
INJECTION, SOLUTION INTRAVENOUS CONTINUOUS
Status: DISCONTINUED | OUTPATIENT
Start: 2020-09-08 | End: 2020-09-10

## 2020-09-08 RX ORDER — MELATONIN
3 NIGHTLY PRN
Status: DISCONTINUED | OUTPATIENT
Start: 2020-09-08 | End: 2020-09-14

## 2020-09-08 RX ORDER — MORPHINE SULFATE 2 MG/ML
2 INJECTION, SOLUTION INTRAMUSCULAR; INTRAVENOUS EVERY 2 HOUR PRN
Status: DISCONTINUED | OUTPATIENT
Start: 2020-09-08 | End: 2020-09-14

## 2020-09-08 RX ORDER — DEXMEDETOMIDINE HYDROCHLORIDE 4 UG/ML
INJECTION, SOLUTION INTRAVENOUS CONTINUOUS
Status: DISCONTINUED | OUTPATIENT
Start: 2020-09-08 | End: 2020-09-08

## 2020-09-08 RX ORDER — MORPHINE SULFATE 2 MG/ML
6 INJECTION, SOLUTION INTRAMUSCULAR; INTRAVENOUS EVERY 2 HOUR PRN
Status: DISCONTINUED | OUTPATIENT
Start: 2020-09-08 | End: 2020-09-14

## 2020-09-08 RX ORDER — SODIUM CHLORIDE 9 MG/ML
INJECTION, SOLUTION INTRAVENOUS
Status: COMPLETED | OUTPATIENT
Start: 2020-09-09 | End: 2020-09-08

## 2020-09-08 RX ORDER — CARVEDILOL 3.12 MG/1
3.12 TABLET ORAL 2 TIMES DAILY WITH MEALS
Status: DISCONTINUED | OUTPATIENT
Start: 2020-09-08 | End: 2020-09-10

## 2020-09-08 RX ORDER — ALBUMIN, HUMAN INJ 5% 5 %
500 SOLUTION INTRAVENOUS ONCE
Status: COMPLETED | OUTPATIENT
Start: 2020-09-08 | End: 2020-09-08

## 2020-09-08 RX ORDER — EPHEDRINE SULFATE 50 MG/ML
INJECTION, SOLUTION INTRAVENOUS AS NEEDED
Status: DISCONTINUED | OUTPATIENT
Start: 2020-09-08 | End: 2020-09-08 | Stop reason: SURG

## 2020-09-08 RX ORDER — ONDANSETRON 2 MG/ML
4 INJECTION INTRAMUSCULAR; INTRAVENOUS EVERY 6 HOURS PRN
Status: DISCONTINUED | OUTPATIENT
Start: 2020-09-08 | End: 2020-09-14

## 2020-09-08 RX ORDER — MIDAZOLAM HYDROCHLORIDE 1 MG/ML
INJECTION INTRAMUSCULAR; INTRAVENOUS AS NEEDED
Status: DISCONTINUED | OUTPATIENT
Start: 2020-09-08 | End: 2020-09-08 | Stop reason: SURG

## 2020-09-08 RX ORDER — HYDROCODONE BITARTRATE AND ACETAMINOPHEN 10; 325 MG/1; MG/1
1 TABLET ORAL EVERY 4 HOURS PRN
Status: DISCONTINUED | OUTPATIENT
Start: 2020-09-08 | End: 2020-09-14

## 2020-09-08 RX ORDER — PROTAMINE SULFATE 10 MG/ML
INJECTION, SOLUTION INTRAVENOUS AS NEEDED
Status: DISCONTINUED | OUTPATIENT
Start: 2020-09-08 | End: 2020-09-08 | Stop reason: SURG

## 2020-09-08 RX ORDER — ASPIRIN 325 MG
325 TABLET, DELAYED RELEASE (ENTERIC COATED) ORAL DAILY
Status: DISCONTINUED | OUTPATIENT
Start: 2020-09-09 | End: 2020-09-14

## 2020-09-08 RX ADMIN — ROCURONIUM BROMIDE 50 MG: 10 INJECTION, SOLUTION INTRAVENOUS at 08:16:00

## 2020-09-08 RX ADMIN — VANCOMYCIN HYDROCHLORIDE 1000 MG: 1 INJECTION, POWDER, LYOPHILIZED, FOR SOLUTION INTRAVENOUS at 07:30:00

## 2020-09-08 RX ADMIN — EPHEDRINE SULFATE 5 MG: 50 INJECTION, SOLUTION INTRAVENOUS at 07:15:00

## 2020-09-08 RX ADMIN — PROTAMINE SULFATE 310 MG: 10 INJECTION, SOLUTION INTRAVENOUS at 10:53:00

## 2020-09-08 RX ADMIN — PHENYLEPHRINE HCL 100 MCG: 10 MG/ML VIAL (ML) INJECTION at 09:04:00

## 2020-09-08 RX ADMIN — LEVOFLOXACIN 500 MG: 5 INJECTION, SOLUTION INTRAVENOUS at 07:30:00

## 2020-09-08 RX ADMIN — MIDAZOLAM HYDROCHLORIDE 2 MG: 1 INJECTION INTRAMUSCULAR; INTRAVENOUS at 07:51:00

## 2020-09-08 RX ADMIN — ROCURONIUM BROMIDE 50 MG: 10 INJECTION, SOLUTION INTRAVENOUS at 10:07:00

## 2020-09-08 RX ADMIN — DIPHENHYDRAMINE HYDROCHLORIDE 50 MG: 50 INJECTION INTRAMUSCULAR; INTRAVENOUS at 06:32:00

## 2020-09-08 RX ADMIN — EPHEDRINE SULFATE 5 MG: 50 INJECTION, SOLUTION INTRAVENOUS at 07:17:00

## 2020-09-08 RX ADMIN — NITROGLYCERIN 10 MCG/MIN: 20 INJECTION INTRAVENOUS at 11:13:00

## 2020-09-08 RX ADMIN — DEXMEDETOMIDINE HYDROCHLORIDE 0.3 MCG/KG/HR: 4 INJECTION, SOLUTION INTRAVENOUS at 09:14:00

## 2020-09-08 RX ADMIN — PHENYLEPHRINE HCL 100 MCG: 10 MG/ML VIAL (ML) INJECTION at 09:00:00

## 2020-09-08 RX ADMIN — PROTAMINE SULFATE 10 MG: 10 INJECTION, SOLUTION INTRAVENOUS at 10:52:00

## 2020-09-08 RX ADMIN — SODIUM CHLORIDE: 9 INJECTION, SOLUTION INTRAVENOUS at 06:31:00

## 2020-09-08 RX ADMIN — PHENYLEPHRINE HCL 100 MCG: 10 MG/ML VIAL (ML) INJECTION at 09:02:00

## 2020-09-08 RX ADMIN — ROCURONIUM BROMIDE 50 MG: 10 INJECTION, SOLUTION INTRAVENOUS at 06:38:00

## 2020-09-08 RX ADMIN — DOBUTAMINE HYDROCHLORIDE 5 MCG/KG/MIN: 200 INJECTION INTRAVENOUS at 10:35:00

## 2020-09-08 RX ADMIN — FAMOTIDINE 20 MG: 10 INJECTION, SOLUTION INTRAVENOUS at 06:32:00

## 2020-09-08 RX ADMIN — MIDAZOLAM HYDROCHLORIDE 2 MG: 1 INJECTION INTRAMUSCULAR; INTRAVENOUS at 06:36:00

## 2020-09-08 RX ADMIN — MIDAZOLAM HYDROCHLORIDE 2 MG: 1 INJECTION INTRAMUSCULAR; INTRAVENOUS at 06:32:00

## 2020-09-08 RX ADMIN — HEPARIN SODIUM 21000 UNITS: 1000 INJECTION, SOLUTION INTRAVENOUS; SUBCUTANEOUS at 08:56:00

## 2020-09-08 RX ADMIN — HEPARIN SODIUM 5000 UNITS: 1000 INJECTION, SOLUTION INTRAVENOUS; SUBCUTANEOUS at 08:38:00

## 2020-09-08 RX ADMIN — LIDOCAINE HYDROCHLORIDE 50 MG: 10 INJECTION, SOLUTION EPIDURAL; INFILTRATION; INTRACAUDAL; PERINEURAL at 06:36:00

## 2020-09-08 NOTE — PROGRESS NOTES
[facility]    Nephrology Progress Note    Kena Kyle Attending:  Wilner Terrell MD       SUBJECTIVE:     Underwent CABG  Remains intubated  On epi and dobutamine  K uptrended to 6    PHYSICAL EXAM:     Vital Signs: /61 (BP Location: Rig 09/08/2020    LYPERCENT 3.9 09/08/2020    MOPERCENT 4.2 09/08/2020    EOPERCENT 0.4 09/08/2020    BAPERCENT 0.3 09/08/2020    NE 28.81 (H) 09/08/2020    LYMABS 1.26 09/08/2020    MOABSO 1.33 (H) 09/08/2020    EOABSO 0.13 09/08/2020    BAABSO 0.08 09/08/202 Intravenous, Q6H PRN  mupirocin (BACTROBAN) 2% nasal ointment OINT 1 Application, 1 Application, Nasal, BID  dextrose 5 %-0.45 % NaCl infusion,  mL/hr, Intravenous, Continuous  [START ON 9/9/2020] Pantoprazole Sodium (PROTONIX) 40 mg in Sodium Chlori Oral, Nightly  allopurinol (ZYLOPRIM) tab 300 mg, 300 mg, Oral, Daily  multivitamin (DIALYVITE - RENAL) tab 1 tablet, 1 tablet, Oral, Daily        ASSESSMENT/PLAN:     83 yo M with history of ESRD on iHD via LUE AVF MWF at [employer], HTN, HL, DM, pre

## 2020-09-08 NOTE — ANESTHESIA PROCEDURE NOTES
Airway  Date/Time: 9/8/2020 6:40 AM  Urgency: elective    Airway not difficult    General Information and Staff    Patient location during procedure: OR  Anesthesiologist: Edwardo Lopez MD  Performed: anesthesiologist     Indications and Patient Conditio

## 2020-09-08 NOTE — PROGRESS NOTES
GAG Hospitalist Progress Note     [facility]      SUBJECTIVE:  S/p OR today  Hyperkalemic post-op  On pressors post-op, remains intubate    OBJECTIVE:  Temp:  [97.8 °F (36.6 °C)-99.2 °F (37.3 °C)] 97.9 °F (36.6 °C)  Pulse:  [] 80  Resp:  [12-2 09/08/20  1507 09/08/20  1605   PGLU 107* 113* 125* 125* 119*       Imaging:  Xr Chest Pa + Lat Chest (cpt=71046)    Result Date: 9/4/2020  PROCEDURE:  XR CHEST PA + LAT CHEST (CPT=71046)  INDICATIONS:  hypoxia  COMPARISON:  [facility] , XR, XR CHEST AP PORTAB No mass or adenopathy. MEDIASTINUM:  Multiple scattered nodes may be reactive. CARDIAC:  Coronary artery calcifications. PLEURA:  No mass or effusion. THORACIC AORTA:  Atherosclerosis. CHEST WALL:  No mass or axillary adenopathy.   LIMITED ABDOMEN:  Austin Clark basal cisterns are normal and patent. Moderate vascular  calcification at the skull base. SINUSES:           No sign of acute sinusitis. MASTOIDS:          No sign of acute inflammation. SKULL:             No evidence for fracture or osseous abnormality. Clinical correlation recommended. Trace left pleural effusion. No pneumothorax. Degenerative changes in the spine. Calcified plaque in the tortuous thoracic aorta.             CONCLUSION:  Interval development of mixed interstitial and ground-glass opac Or  morphINE sulfate (PF) 2 MG/ML injection 4 mg, 4 mg, Intravenous, Q2H PRN    Or  morphINE sulfate (PF) 2 MG/ML injection 6 mg, 6 mg, Intravenous, Q2H PRN  melatonin tab 3 mg, 3 mg, Oral, Nightly PRN  Albumin Human (ALBUMINAR) 5 % solution 250 mL, 250 mL (DEX4) oral liquid 30 g, 30 g, Oral, Q15 Min PRN    Or  Glucose-Vitamin C (DEX-4) chewable tab 8 tablet, 8 tablet, Oral, Q15 Min PRN  EPINEPHrine HCl (ADRENALIN) 4 mg in sodium chloride 0.9% 250 mL infusion, 1-10 mcg/min, Intravenous, Continuous  Dexmedeto DM type 2 with hyperglycemia:  endo c/s post-op, insulin gtt     # ESRD on HD consulted renal - last session 9/2.  Plans for HD tomorrow    # Hyperkalemia  - 6 --> getting HD today     # HTN, HL  - Bp meds and statin ordered     # Anemia; denies bleeding  -

## 2020-09-08 NOTE — PROGRESS NOTES
Received patient on VC+ 13/500/50%/+5. Weaned oxygen to 40%, patient tolerated well. Breath sounds are clear bilaterally. Patient is on vent settings noted below. Plan to keep patient intubated overnight and wean in the morning. Will continue to monitor.

## 2020-09-08 NOTE — ANESTHESIA PROCEDURE NOTES
Central Line  Performed by: Martha Kessler MD  Authorized by: Martha Kessler MD     General Information and Staff    Procedure Start:  9/8/2020 6:45 AM  Procedure End:  9/8/2020 7:00 AM  Anesthesiologist:  Martha Kessler MD  Performed by:   Anesthesiolog

## 2020-09-08 NOTE — ANESTHESIA POSTPROCEDURE EVALUATION
[facility] Patient Status:  Inpatient   Age/Gender 82year old male MRN [id number]   [facility] 6NE-A Attending Wilner Terrell MD   [facility] Day # 7 PCP Bogdan Mon MD       Anesthesia Post-op Note    Procedure(s):  Co

## 2020-09-08 NOTE — PLAN OF CARE
Assumed care of pt at 1. VSS, L arm precautions for AV fistula, bruising on R groin. Nephrology consulted for sustained SBP > 180, ordered medication, SBP returned within parameters. Pt has HTN when laying flat. Dyspnea with exertion.  ACHS accuchecks do

## 2020-09-08 NOTE — OPERATIVE REPORT
Operative Note    Patient Name: Thuy Melendrez    Preoperative Diagnosis: Coronary artery disease    Postoperative Diagnosis: same    Primary Surgeon: Minnie Renner    Assistant: Dr. Edwin Mckeon    Procedures: CABG x 3; SVG to LAD, LIMA to Diag, SVG to OM

## 2020-09-08 NOTE — CM/SW NOTE
Received call from Cheo at [facility] OP dialysis. Patient is MWF, 10 am chair time. OP HD center is requesting most recent flowsheet to be faxed to their facility ([phone]) at time of discharge.

## 2020-09-08 NOTE — CONSULTS
[facility]      Endocrinology Consultation    [address] [facility] Patient Status:  Inpatient    1935 MRN [id number]   [facility] 6NE-A Attending Sanna Quinteros MD   [address] Day # 7 PCP Eduardo Tena MD     Reason for Consultation:  T2 Rfl: PRN, 9/1/2020 at Unknown time  [facility] Does not apply Misc, TEST ONCE DAILY. QI:K79. 9. NIDDM, Disp: 100 each, Rfl: 3, 9/1/2020 at Unknown time  HM VITAMIN D3 2000 units Oral Cap, Take 2,000 Units by mouth daily.   , Disp: , Rfl: 6, 9/ used smokeless tobacco. He reports that he does not drink alcohol or use drugs.     Allergies:    Cephalexin              SWELLING  Erythromycin Base           Comment:Swollen body parts and rash  Hydralazine             OTHER (SEE COMMENTS)    Comment:\"Bl [START ON 9/9/2020] Vancomycin HCl (VANCOCIN) 750 mg in sodium chloride 0.9% 250 mL IVPB add-vantage, 10 mg/kg (Adjusted), Intravenous, Once  •  mupirocin (BACTROBAN) 2% nasal ointment OINT 1 Application, 1 Application, Nasal, BID  •  dextrose 5 %-0.45 % N Artery)   Resp 22   Ht 69\"   Wt 192 lb 0.3 oz (87.1 kg)   SpO2 100%   BMI 28.36 kg/m²     General: sedated  Eyes: closed  ENT: intubated  Neck:  no thyromegaly    Lymph: no anterior cervical or supraclavicular lymphadenopathy  CV: + chest tube  Resp: [facility]

## 2020-09-08 NOTE — ANESTHESIA PROCEDURE NOTES
Procedure Performed: SHANDRA      Start Time:  9/8/2020 7:20 AM       End Time:   9/8/2020 12:00 PM    Preanesthesia Checklist:  Patient identified, IV assessed, risks and benefits discussed, monitors and equipment assessed, procedure being performed at [facility] normal.  Dissection not present. Plaque thickness less than 3 mm. Mobile plaque not present. Descending Aorta:  Size normal.  Dissection not present. Plaque thickness less than 3 mm. Mobile plaque not present.           Atria    Right Atrium:  Size n

## 2020-09-08 NOTE — DIETARY NOTE
[facility]    NUTRITION ASSESSMENT    Pt does not meet malnutrition criteria. NUTRITION DIAGNOSIS/PROBLEM:    Increased nutrient needs related to increased demands of healing as evidenced by CABG on 9/8    NUTRITION INTERVENTION:       1.  Meal and grams protein per kg)  Fluid: ~1 ml/kcal or per MD discretion    MONITOR AND EVALUATE/NUTRITION GOALS:    1. PO intake to meet at least 75% patient nutrition prescription  3. No signs of skin breakdown  4.  Maintain lean body mass    MEDICATIONS:  MVI, Abx

## 2020-09-08 NOTE — PROGRESS NOTES
Critical Care Progress Note        NAME: [address] [place] [address] Street: [id number] - MRN: [id number] - Age: 82year old - : 1935  Date of Admission: 2020  9:30 AM  Admission Diagnosis: Dyspnea on exertion [N40.75]  Acute diastolic congestive heart f D5W     • norepinephrine     • nitroprusside (NIPRIDE) 50 mg in D5W infusion     • Dextrose-NaCl 70 mL/hr (09/08/20 1216)   • insulin regular 2 Units/hr (09/08/20 1400)   • EPINEPHrine (ADRENALIN) infusion 6 mcg/min (09/08/20 1400)   • dexmedetomidine 0.3

## 2020-09-08 NOTE — ANESTHESIA PROCEDURE NOTES
Arterial Line  Performed by: Faizan Mustafa MD  Authorized by: Faizan Mustafa MD     General Information and Staff    Procedure Start:  9/8/2020 6:45 AM  Procedure End:  9/8/2020 6:46 AM  Anesthesiologist:  Faizan Mustafa MD  Performed By:  Umer Mitchell

## 2020-09-08 NOTE — CONSULTS
Pharmacy consulted to dose post-op vancomycin. ESRD on MWF HD. Will give vancomycin 750 mg IV x1 after HD tomorrow. Suhail Campuzano PharmD  09/08/20 12:46 PM    Addendum:  Plan for STAT HD today. Will give vancomycin 750 mg after HD tonight.     Layla Umanzor

## 2020-09-08 NOTE — PROGRESS NOTES
[facility] Cardiology  Progress Note    Bety Mccann Patient Status:  Inpatient    1935 MRN [id number]   [facility] 2NE-A Attending Luis Fernando Mcintyre MD   Hosp Day # 7 PCP Aniya Rivas MD     Subjective:   Seen post- Nitroglycerin in D5W, norepinephrine, nitroprusside (NIPRIDE) 50 mg in D5W infusion, PEG 3350, bisacodyl, ondansetron HCl, glucose **OR** Glucose-Vitamin C **OR** dextrose **OR** glucose **OR** Glucose-Vitamin C, Bismuth Subsalicylate, ipratropium-albutero 09/08/20  1315   GLU 94 90 88 103*  --    BUN 40* 55* 30* 31*  --    CREATSERUM 7.33* 8.80* 5.78* 5.71*  --    GFRAA 7* 6* 10* 10*  --    GFRNAA 6* 5* 8* 8*  --    CA 9.3 9.0 9.3 8.9  --    ALB 2.9* 3.0* 2.9*  --   --     137 137 136  --    K 4.3 4.5 Cath 9/4/20:     LM: 95% with filling defect and calcium. LAD: Ostial disease from LM. CX: Ostail 95%, probably co-dominant vessel. RCA: Occluded. May be non-dominant.   Limited pictures given severe disease.     RA: 0  RV35/2  PA 31/10  PCW 8  CO 6

## 2020-09-09 ENCOUNTER — APPOINTMENT (OUTPATIENT)
Dept: GENERAL RADIOLOGY | Facility: HOSPITAL | Age: 85
DRG: 233 | End: 2020-09-09
Attending: PHYSICIAN ASSISTANT
Payer: MEDICARE

## 2020-09-09 ENCOUNTER — APPOINTMENT (OUTPATIENT)
Dept: GENERAL RADIOLOGY | Facility: HOSPITAL | Age: 85
DRG: 233 | End: 2020-09-09
Attending: THORACIC SURGERY (CARDIOTHORACIC VASCULAR SURGERY)
Payer: MEDICARE

## 2020-09-09 LAB
ALBUMIN SERPL-MCNC: 2.5 G/DL (ref 3.4–5)
ALBUMIN SERPL-MCNC: 2.5 G/DL (ref 3.4–5)
ALBUMIN/GLOB SERPL: 0.9 {RATIO} (ref 1–2)
ALP LIVER SERPL-CCNC: 78 U/L (ref 45–117)
ALT SERPL-CCNC: 13 U/L (ref 16–61)
ANION GAP SERPL CALC-SCNC: 5 MMOL/L (ref 0–18)
ANION GAP SERPL CALC-SCNC: 8 MMOL/L (ref 0–18)
ARTERIAL BLD GAS O2 SATURATION: 96 % (ref 92–100)
ARTERIAL BLD GAS O2 SATURATION: 96 % (ref 92–100)
ARTERIAL BLOOD GAS BASE EXCESS: -0.4 MMOL/L (ref ?–2)
ARTERIAL BLOOD GAS BASE EXCESS: -0.6 MMOL/L (ref ?–2)
ARTERIAL BLOOD GAS HCO3: 22.7 MEQ/L (ref 22–26)
ARTERIAL BLOOD GAS HCO3: 23.3 MEQ/L (ref 22–26)
ARTERIAL BLOOD GAS PCO2: 31 MM HG (ref 35–45)
ARTERIAL BLOOD GAS PCO2: 34 MM HG (ref 35–45)
ARTERIAL BLOOD GAS PH: 7.45 (ref 7.35–7.45)
ARTERIAL BLOOD GAS PH: 7.48 (ref 7.35–7.45)
ARTERIAL BLOOD GAS PO2: 105 MM HG (ref 80–105)
ARTERIAL BLOOD GAS PO2: 110 MM HG (ref 80–105)
AST SERPL-CCNC: 21 U/L (ref 15–37)
ATRIAL RATE: 77 BPM
ATRIAL RATE: 80 BPM
ATRIAL RATE: 87 BPM
BASOPHILS # BLD AUTO: 0.06 X10(3) UL (ref 0–0.2)
BASOPHILS NFR BLD AUTO: 0.3 %
BILIRUB SERPL-MCNC: 0.7 MG/DL (ref 0.1–2)
BLOOD TYPE BARCODE: 6200
BUN BLD-MCNC: 23 MG/DL (ref 7–18)
BUN BLD-MCNC: 24 MG/DL (ref 7–18)
BUN/CREAT SERPL: 5.3 (ref 10–20)
BUN/CREAT SERPL: 5.5 (ref 10–20)
CALCIUM BLD-MCNC: 8.6 MG/DL (ref 8.5–10.1)
CALCIUM BLD-MCNC: 8.6 MG/DL (ref 8.5–10.1)
CALCULATED O2 SATURATION: 99 % (ref 92–100)
CALCULATED O2 SATURATION: 99 % (ref 92–100)
CARBOXYHEMOGLOBIN: 1.5 % SAT (ref 0–3)
CARBOXYHEMOGLOBIN: 1.5 % SAT (ref 0–3)
CHLORIDE SERPL-SCNC: 103 MMOL/L (ref 98–112)
CHLORIDE SERPL-SCNC: 103 MMOL/L (ref 98–112)
CO2 SERPL-SCNC: 25 MMOL/L (ref 21–32)
CO2 SERPL-SCNC: 27 MMOL/L (ref 21–32)
CPAP: 5 CM H2O
CREAT BLD-MCNC: 4.17 MG/DL (ref 0.7–1.3)
CREAT BLD-MCNC: 4.52 MG/DL (ref 0.7–1.3)
DEPRECATED RDW RBC AUTO: 66.5 FL (ref 35.1–46.3)
EOSINOPHIL # BLD AUTO: 0.37 X10(3) UL (ref 0–0.7)
EOSINOPHIL NFR BLD AUTO: 2 %
ERYTHROCYTE [DISTWIDTH] IN BLOOD BY AUTOMATED COUNT: 17.5 % (ref 11–15)
FIO2: 40 %
FIO2: 40 %
GLOBULIN PLAS-MCNC: 2.8 G/DL (ref 2.8–4.4)
GLUCOSE BLD-MCNC: 104 MG/DL (ref 70–99)
GLUCOSE BLD-MCNC: 104 MG/DL (ref 70–99)
GLUCOSE BLD-MCNC: 107 MG/DL (ref 70–99)
GLUCOSE BLD-MCNC: 108 MG/DL (ref 70–99)
GLUCOSE BLD-MCNC: 114 MG/DL (ref 70–99)
GLUCOSE BLD-MCNC: 114 MG/DL (ref 70–99)
GLUCOSE BLD-MCNC: 116 MG/DL (ref 70–99)
GLUCOSE BLD-MCNC: 117 MG/DL (ref 70–99)
GLUCOSE BLD-MCNC: 117 MG/DL (ref 70–99)
GLUCOSE BLD-MCNC: 119 MG/DL (ref 70–99)
GLUCOSE BLD-MCNC: 123 MG/DL (ref 70–99)
GLUCOSE BLD-MCNC: 123 MG/DL (ref 70–99)
GLUCOSE BLD-MCNC: 124 MG/DL (ref 70–99)
GLUCOSE BLD-MCNC: 124 MG/DL (ref 70–99)
GLUCOSE BLD-MCNC: 125 MG/DL (ref 70–99)
GLUCOSE BLD-MCNC: 125 MG/DL (ref 70–99)
GLUCOSE BLD-MCNC: 126 MG/DL (ref 70–99)
HAV IGM SER QL: 2.2 MG/DL (ref 1.6–2.6)
HCT VFR BLD AUTO: 25.6 % (ref 39–53)
HGB BLD-MCNC: 8.2 G/DL (ref 13–17.5)
IMM GRANULOCYTES # BLD AUTO: 0.1 X10(3) UL (ref 0–1)
IMM GRANULOCYTES NFR BLD: 0.5 %
L/M: 8 L/MIN
LYMPHOCYTES # BLD AUTO: 0.66 X10(3) UL (ref 1–4)
LYMPHOCYTES NFR BLD AUTO: 3.5 %
M PROTEIN MFR SERPL ELPH: 5.3 G/DL (ref 6.4–8.2)
MCH RBC QN AUTO: 33.2 PG (ref 26–34)
MCHC RBC AUTO-ENTMCNC: 32 G/DL (ref 31–37)
MCV RBC AUTO: 103.6 FL (ref 80–100)
METHEMOGLOBIN: 0.5 % SAT (ref 0.4–1.5)
METHEMOGLOBIN: 0.5 % SAT (ref 0.4–1.5)
MONOCYTES # BLD AUTO: 1.06 X10(3) UL (ref 0.1–1)
MONOCYTES NFR BLD AUTO: 5.6 %
NEUTROPHILS # BLD AUTO: 16.59 X10 (3) UL (ref 1.5–7.7)
NEUTROPHILS # BLD AUTO: 16.59 X10(3) UL (ref 1.5–7.7)
NEUTROPHILS NFR BLD AUTO: 88.1 %
OSMOLALITY SERPL CALC.SUM OF ELEC: 284 MOSM/KG (ref 275–295)
OSMOLALITY SERPL CALC.SUM OF ELEC: 286 MOSM/KG (ref 275–295)
P AXIS: -24 DEGREES
P AXIS: 66 DEGREES
P AXIS: 81 DEGREES
P-R INTERVAL: 140 MS
P-R INTERVAL: 256 MS
P/F RATIO: 267 MMHG
P/F RATIO: 275.3 MMHG
PATIENT TEMPERATURE: 98.1 F
PATIENT TEMPERATURE: 98.6 F
PHOSPHATE SERPL-MCNC: 3.9 MG/DL (ref 2.5–4.9)
PLATELET # BLD AUTO: 127 10(3)UL (ref 150–450)
PLATELET MORPHOLOGY: NORMAL
POTASSIUM SERPL-SCNC: 4.3 MMOL/L (ref 3.5–5.1)
POTASSIUM SERPL-SCNC: 4.4 MMOL/L (ref 3.5–5.1)
PRESSURE SUPPORT: 5 CM H2O
Q-T INTERVAL: 368 MS
Q-T INTERVAL: 406 MS
Q-T INTERVAL: 448 MS
QRS DURATION: 136 MS
QRS DURATION: 146 MS
QRS DURATION: 42 MS
QTC CALCULATION (BEZET): 425 MS
QTC CALCULATION (BEZET): 506 MS
QTC CALCULATION (BEZET): 507 MS
R AXIS: -23 DEGREES
R AXIS: -28 DEGREES
R AXIS: 269 DEGREES
RBC # BLD AUTO: 2.47 X10(6)UL (ref 3.8–5.8)
SODIUM SERPL-SCNC: 135 MMOL/L (ref 136–145)
SODIUM SERPL-SCNC: 136 MMOL/L (ref 136–145)
T AXIS: -29 DEGREES
T AXIS: 100 DEGREES
T AXIS: 110 DEGREES
TOTAL HEMOGLOBIN: 8.4 G/DL (ref 12.6–17.4)
TOTAL HEMOGLOBIN: 8.6 G/DL (ref 12.6–17.4)
VENTRICULAR RATE: 114 BPM
VENTRICULAR RATE: 66 BPM
VENTRICULAR RATE: 77 BPM
WBC # BLD AUTO: 18.8 X10(3) UL (ref 4–11)

## 2020-09-09 PROCEDURE — 94003 VENT MGMT INPAT SUBQ DAY: CPT

## 2020-09-09 PROCEDURE — 71045 X-RAY EXAM CHEST 1 VIEW: CPT | Performed by: THORACIC SURGERY (CARDIOTHORACIC VASCULAR SURGERY)

## 2020-09-09 PROCEDURE — 93005 ELECTROCARDIOGRAM TRACING: CPT

## 2020-09-09 PROCEDURE — 80053 COMPREHEN METABOLIC PANEL: CPT | Performed by: PHYSICIAN ASSISTANT

## 2020-09-09 PROCEDURE — 85025 COMPLETE CBC W/AUTO DIFF WBC: CPT | Performed by: PHYSICIAN ASSISTANT

## 2020-09-09 PROCEDURE — C9113 INJ PANTOPRAZOLE SODIUM, VIA: HCPCS | Performed by: PHYSICIAN ASSISTANT

## 2020-09-09 PROCEDURE — 80069 RENAL FUNCTION PANEL: CPT | Performed by: PHYSICIAN ASSISTANT

## 2020-09-09 PROCEDURE — 85018 HEMOGLOBIN: CPT | Performed by: INTERNAL MEDICINE

## 2020-09-09 PROCEDURE — 82962 GLUCOSE BLOOD TEST: CPT

## 2020-09-09 PROCEDURE — 93010 ELECTROCARDIOGRAM REPORT: CPT | Performed by: INTERNAL MEDICINE

## 2020-09-09 PROCEDURE — 82803 BLOOD GASES ANY COMBINATION: CPT | Performed by: INTERNAL MEDICINE

## 2020-09-09 PROCEDURE — 84100 ASSAY OF PHOSPHORUS: CPT | Performed by: PHYSICIAN ASSISTANT

## 2020-09-09 PROCEDURE — 94150 VITAL CAPACITY TEST: CPT

## 2020-09-09 PROCEDURE — 82375 ASSAY CARBOXYHB QUANT: CPT | Performed by: INTERNAL MEDICINE

## 2020-09-09 PROCEDURE — 83735 ASSAY OF MAGNESIUM: CPT | Performed by: PHYSICIAN ASSISTANT

## 2020-09-09 PROCEDURE — 83050 HGB METHEMOGLOBIN QUAN: CPT | Performed by: INTERNAL MEDICINE

## 2020-09-09 PROCEDURE — 71045 X-RAY EXAM CHEST 1 VIEW: CPT | Performed by: PHYSICIAN ASSISTANT

## 2020-09-09 NOTE — PROGRESS NOTES
[address] [facility] Cardiology  Progress Note    Kena Kyle Patient Status:  Inpatient    1935 MRN [id number]   [facility] 2NE-A Attending Mina Chen MD   Hosp Day # 8 PCP Bogdan Mon MD     Subjective:   Extubated. DOBUTamine, Nitroglycerin in D5W, norepinephrine, nitroprusside (NIPRIDE) 50 mg in D5W infusion, PEG 3350, bisacodyl, ondansetron HCl, glucose **OR** Glucose-Vitamin C **OR** dextrose **OR** glucose **OR** Glucose-Vitamin C, Midazolam HCl, ipratropium-albu 09/07/20  0407 09/08/20  0443 09/08/20  1215 09/08/20  1315 09/08/20  2237 09/09/20  0429   GLU 90 88 103*  --   --  104*  104*   BUN 55* 30* 31*  --   --  24*  23*   CREATSERUM 8.80* 5.78* 5.71*  --   --  4.52*  4.17*   GFRAA 6* 10* 10*  --   --  13*  14* There is mild regurgitation. 4. Left atrium: The atrium is markedly dilated. 5. Right ventricle: Estimation of the right ventricular systolic pressure is     within the normal range. 6. Right atrium: The atrium is mildly dilated. 7. Tricuspid valve:  Dante Singh

## 2020-09-09 NOTE — PROGRESS NOTES
YESENIA Hospitalist Progress Note     [facility]      SUBJECTIVE:  Had HD done yesterday after surgery  Extubated this morning  Feeling ok, morphine for pain control  Remains on low dose epi and dobutamine    OBJECTIVE:  Temp:  [97 °F (36.1 °C)-98.6 °F --   --  104*  104*       Recent Labs   Lab 09/05/20  0523 09/06/20  0453 09/07/20  0407 09/08/20  0443 09/09/20  0429   ALT  --   --   --   --  13*   AST  --   --   --   --  21   ALB 3.0* 2.9* 3.0* 2.9* 2.5*  2.5*       Recent Labs   Lab 09/09/20  0514 09 Right upper lobe bronchiectasis with associated nonspecific ground-glass density. Multiple scattered small nodular ground-glass densities, the largest is located within the right lower lobe and measures 8 mm.   VASCULATURE:  Thrombus cannot be excluded wit by Technologist)     FINDINGS:  VENTRICLES/SULCI:  Moderate atrophy without hydrocephalus. INTRACRANIAL:  No hemorrhage. No acute infarct. Mild chronic deep white matter ischemic change in the periventricular deep white matter.   No extra-axial fluid yasmeen and ground-glass opacities throughout the bilateral lungs. Cardiomegaly with mild pulmonary vascular congestion.   The overall findings could be due to pulmonary edema from congestive failure, with atypical pneumonia from COVID-19 infection or other etiolo Subcutaneous, TID CC  Insulin Aspart Pen (NOVOLOG) 100 UNIT/ML flexpen 1-40 Units, 1-40 Units, Subcutaneous, TID CC and HS  carvedilol (COREG) tab 3.125 mg, 3.125 mg, Oral, BID with meals  0.9% NaCl infusion, , Intravenous, Continuous  HYDROcodone-acetamin (DEX-4) chewable tab 4 tablet, 4 tablet, Oral, Q15 Min PRN    Or  dextrose 50 % injection 50 mL, 50 mL, Intravenous, Q15 Min PRN    Or  glucose (DEX4) oral liquid 30 g, 30 g, Oral, Q15 Min PRN    Or  Glucose-Vitamin C (DEX-4) chewable tab 8 tablet, 8 table insufficiency  - appreciate pulm recs  - extubated this morning  - monitor    # Leukocytosis  - down-trending this AM, suspect post-op reactive  - Afebrile, monitor CBC     # Abnormal CXR  - CT chest with emphysematous changes -- infectious vs inflammatory

## 2020-09-09 NOTE — PHYSICAL THERAPY NOTE
Physical Therapy    Orders received for PT eval.  Will initiate eval tomorrow once pt is more medically stable following procedure. Discussed w/ anselmo Meade who is in agreement.

## 2020-09-09 NOTE — OCCUPATIONAL THERAPY NOTE
Received order for OT evaluation. Chart reviewed. Will initiate therapy tomorrow, once pt is medically stable.

## 2020-09-09 NOTE — PROGRESS NOTES
[facility]  CV Surgery Progress Note    Samaria Sawyer Patient Status:  Inpatient    1935 MRN [id number]   [facility] 6NE-A Attending Dylan Leal MD   [facility] Day # 8 PCP Sylvia Robertson MD     Subjective:  Patient extubated thi incision with dressing intact, CT x2 with sanginous output. LLE with ace wrap.    Neuro: no focal deficits     CXR: 9/9/2020  CONCLUSION:    The ET tube is at the level the juana near the origin of the right mainstem bronchus and should be withdrawn 2.5 cm

## 2020-09-09 NOTE — PLAN OF CARE
Assumed pt care around 1900. Pt intubated and sedated on a Precedex and Propofol gtt (see MAR for titrations). Pt's sedation decreased and pt able to follow commands: open eyes, squeeze hands, and wiggle toes. Pupils are PERRL.  Pt atrial paced at a rate of Goal  Description  Patient's Long Term Goal: discharge home    Interventions:  - follow up plan in place  - See additional Care Plan goals for specific interventions   Outcome: Progressing  Goal: Patient/Family Short Term Goal  Description  Patient's Short oxygenation  Description  INTERVENTIONS:  - Assess for changes in respiratory status  - Assess for changes in mentation and behavior  - Position to facilitate oxygenation and minimize respiratory effort  - Oxygen supplementation based on oxygen saturation needed  Outcome: Progressing  Goal: Incision(s), wounds(s) or drain site(s) healing without S/S of infection  Description  INTERVENTIONS:  - Assess and document risk factors for pressure ulcer development  - Assess and document skin integrity  - Assess and

## 2020-09-09 NOTE — PROGRESS NOTES
[facility]  Endocrinology Progress Note    Robyn Roach Patient Status:  Inpatient    1935 MRN [id number]   [facility] 6NE-A Attending Franklin Aguayo MD   [facility] Day # 8 PCP Landry Sanchez MD     Subjective:  Feels that his femi 09/09/2020    CO2 25.0 09/09/2020    CO2 27.0 09/09/2020     09/09/2020     09/09/2020    CA 8.6 09/09/2020    CA 8.6 09/09/2020    ALB 2.5 09/09/2020    ALB 2.5 09/09/2020    ALKPHO 78 09/09/2020    BILT 0.7 09/09/2020    TP 5.3 09/09/2020

## 2020-09-09 NOTE — OPERATIVE REPORT
[facility]    PATIENT'S NAME: [facility]   ATTENDING PHYSICIAN: Sanjuana Ratliff MD   OPERATING PHYSICIAN: Wilbert Guzman MD   PATIENT ACCOUNT#:   [id number]    LOCATION:  [address] [place] [address]  MEDICAL RECORD #:   [id number]       DATE OF BIRTH:  [birth year] the heart. Obtuse marginal was bypassed first.  This is a 1.5 mm diffusely diseased artery. A segment of saphenous graft was placed here.   Cardioplegia was given, following which the left internal mammary artery was placed to the diagonal.  Diagonal was

## 2020-09-09 NOTE — PLAN OF CARE
Received pt this am, alert, resting in bed, epi and dobs. Epi weaned off. CT and swan d/c per protocol, post chest xray showed no pneumo. Initially atrially paced, once epi weaned off, A-wire disconnected from pacer.  Underlying rhythm is NSR/RBBB, dobs co

## 2020-09-09 NOTE — PROGRESS NOTES
[facility]    Nephrology Progress Note    Andrés Tavarez Attending:  Rudy Cornell MD       SUBJECTIVE:   Underwent HD yesterday due to hyperkalemia  Extubated this morning  Has some chest pain at incision site, got morphine  Remains on epi/ 09/09/2020    K 4.3 09/09/2020     09/09/2020     09/09/2020    CO2 25.0 09/09/2020    CO2 27.0 09/09/2020     Lab Results   Component Value Date    WBC 18.8 (H) 09/09/2020    RBC 2.47 (L) 09/09/2020    HGB 8.2 (L) 09/09/2020    HCT 25.6 (L) 09 infusion 50mg in D5W 250ml, 5-300 mcg/min, Intravenous, Continuous PRN  norepinephrine (LEVOPHED) 4 mg/250 ml premix infusion, 0.5-30 mcg/min, Intravenous, Continuous PRN  Nitroprusside Sodium (NIPRIDE) 50 mg in dextrose 5 % 250 mL infusion, 0.1-4 mcg/kg/m cap 100 mg, 100 mg, Oral, 2 times per day  Bismuth Subsalicylate (PEPTO-BISMOL) chew tab CHEW 262 mg, 1 tablet, Oral, TID PRN  ipratropium-albuterol (DUONEB) nebulizer solution 3 mL, 3 mL, Nebulization, Q6H PRN  lidocaine-menthol 4-1 % 1 patch, 1 patch, Tr

## 2020-09-09 NOTE — RESPIRATORY THERAPY NOTE
Patient awake and alert, wening trial done on cpap+5, ps 5, fio2 40. Tolerates well.  ABGS done after 30 minutes, weaning parameters done   Anesthesia called , extubate and repeat ABGS in 1 hour  (0730am)

## 2020-09-09 NOTE — PROGRESS NOTES
[facility] Patient Status:  Inpatient    1935 MRN [id number]   [facility] 6NE-A Attending Vikas Yee MD   [facility] Day # 8 PCP Mallory Mckinney MD     Critical Care Progress Note     Date of Admission: 2020 Intravenous, Q6H PRN  •  mupirocin (BACTROBAN) 2% nasal ointment OINT 1 Application, 1 Application, Nasal, BID  •  dextrose 5 %-0.45 % NaCl infusion,  mL/hr, Intravenous, Continuous  •  Pantoprazole Sodium (PROTONIX) 40 mg in Sodium Chloride 0.9 % 10 tablet, 1 tablet, Oral, Daily     OBJECTIVE:  /56   Pulse 80   Temp 98.2 °F (36.8 °C) (Pulmonary Artery)   Resp 20   Ht 5' 9\" (1.753 m)   Wt 192 lb 7.4 oz (87.3 kg)   SpO2 100%   BMI 28.42 kg/m²      3L nc      Wt Readings from Last 3 Encounters:  0 09/08/2020    ISO2 97 09/08/2020    SANTHOSH -1.0 09/08/2020    IFIO2 50.0 09/08/2020        Imaging: CXR: personally reviewed. Per EMR>  ETT has since been removed. ASSESSMENT/PLAN:     1.  Postop resp insufficiency  -expected course  -extubated this am  -b

## 2020-09-10 LAB
ALBUMIN SERPL-MCNC: 2.4 G/DL (ref 3.4–5)
ANION GAP SERPL CALC-SCNC: 7 MMOL/L (ref 0–18)
BUN BLD-MCNC: 33 MG/DL (ref 7–18)
BUN/CREAT SERPL: 5.6 (ref 10–20)
CALCIUM BLD-MCNC: 8.7 MG/DL (ref 8.5–10.1)
CHLORIDE SERPL-SCNC: 102 MMOL/L (ref 98–112)
CO2 SERPL-SCNC: 25 MMOL/L (ref 21–32)
CREAT BLD-MCNC: 5.88 MG/DL (ref 0.7–1.3)
DEPRECATED RDW RBC AUTO: 66.3 FL (ref 35.1–46.3)
ERYTHROCYTE [DISTWIDTH] IN BLOOD BY AUTOMATED COUNT: 17.1 % (ref 11–15)
GLUCOSE BLD-MCNC: 104 MG/DL (ref 70–99)
GLUCOSE BLD-MCNC: 115 MG/DL (ref 70–99)
GLUCOSE BLD-MCNC: 115 MG/DL (ref 70–99)
GLUCOSE BLD-MCNC: 118 MG/DL (ref 70–99)
GLUCOSE BLD-MCNC: 152 MG/DL (ref 70–99)
GLUCOSE BLD-MCNC: 168 MG/DL (ref 70–99)
HAV IGM SER QL: 2.1 MG/DL (ref 1.6–2.6)
HCT VFR BLD AUTO: 25 % (ref 39–53)
HGB BLD-MCNC: 7.8 G/DL (ref 13–17.5)
MCH RBC QN AUTO: 33.1 PG (ref 26–34)
MCHC RBC AUTO-ENTMCNC: 31.2 G/DL (ref 31–37)
MCV RBC AUTO: 105.9 FL (ref 80–100)
OSMOLALITY SERPL CALC.SUM OF ELEC: 286 MOSM/KG (ref 275–295)
PHOSPHATE SERPL-MCNC: 5.1 MG/DL (ref 2.5–4.9)
PLATELET # BLD AUTO: 111 10(3)UL (ref 150–450)
POTASSIUM SERPL-SCNC: 5.7 MMOL/L (ref 3.5–5.1)
RBC # BLD AUTO: 2.36 X10(6)UL (ref 3.8–5.8)
SODIUM SERPL-SCNC: 134 MMOL/L (ref 136–145)
WBC # BLD AUTO: 15 X10(3) UL (ref 4–11)

## 2020-09-10 PROCEDURE — 90935 HEMODIALYSIS ONE EVALUATION: CPT | Performed by: INTERNAL MEDICINE

## 2020-09-10 PROCEDURE — 85027 COMPLETE CBC AUTOMATED: CPT | Performed by: NURSE PRACTITIONER

## 2020-09-10 PROCEDURE — 97166 OT EVAL MOD COMPLEX 45 MIN: CPT

## 2020-09-10 PROCEDURE — 97530 THERAPEUTIC ACTIVITIES: CPT

## 2020-09-10 PROCEDURE — 80069 RENAL FUNCTION PANEL: CPT | Performed by: PHYSICIAN ASSISTANT

## 2020-09-10 PROCEDURE — 97164 PT RE-EVAL EST PLAN CARE: CPT

## 2020-09-10 PROCEDURE — 97116 GAIT TRAINING THERAPY: CPT

## 2020-09-10 PROCEDURE — 82962 GLUCOSE BLOOD TEST: CPT

## 2020-09-10 PROCEDURE — 83735 ASSAY OF MAGNESIUM: CPT | Performed by: PHYSICIAN ASSISTANT

## 2020-09-10 RX ORDER — FUROSEMIDE 10 MG/ML
20 INJECTION INTRAMUSCULAR; INTRAVENOUS ONCE
Status: DISCONTINUED | OUTPATIENT
Start: 2020-09-10 | End: 2020-09-10

## 2020-09-10 RX ORDER — SODIUM CHLORIDE 9 MG/ML
INJECTION, SOLUTION INTRAVENOUS ONCE
Status: DISCONTINUED | OUTPATIENT
Start: 2020-09-10 | End: 2020-09-14

## 2020-09-10 RX ORDER — SODIUM CHLORIDE 9 MG/ML
INJECTION, SOLUTION INTRAVENOUS ONCE
Status: COMPLETED | OUTPATIENT
Start: 2020-09-10 | End: 2020-09-10

## 2020-09-10 NOTE — PROGRESS NOTES
[facility]    Nephrology Progress Note    [address] [facility] Attending:  Rochelle Sotelo DO       SUBJECTIVE:     Feeling a bit down today but otherwise no complaints  Seen on HD, tolerating well    PHYSICAL EXAM:     Vital Signs: /43   Pulse 8 09/09/2020    MOPERCENT 5.6 09/09/2020    EOPERCENT 2.0 09/09/2020    BAPERCENT 0.3 09/09/2020    NE 16.59 (H) 09/09/2020    LYMABS 0.66 (L) 09/09/2020    MOABSO 1.06 (H) 09/09/2020    EOABSO 0.37 09/09/2020    BAABSO 0.06 09/09/2020     Lab Results   Comp 4 mg, 4 mg, Intravenous, Q6H PRN  mupirocin (BACTROBAN) 2% nasal ointment OINT 1 Application, 1 Application, Nasal, BID  Pantoprazole Sodium (PROTONIX) 40 mg in Sodium Chloride 0.9 % 10 mL IV push, 40 mg, Intravenous, QAM AC    Or  Pantoprazole Sodium (PRO [address] KAYY. Lauren Torres, [address] [facility]    9/10/2020  12:45 PM

## 2020-09-10 NOTE — PROGRESS NOTES
[facility] Hospitalist Progress Note     [facility]      SUBJECTIVE:  Denies cp and sob. No dizziness.  Getting HD    OBJECTIVE:  Temp:  [96.8 °F (36 °C)-98.9 °F (37.2 °C)] 97.4 °F (36.3 °C)  Pulse:  [60-80] 80  Resp:  [13-22] 20  BP: (103-150)/(39-55) 141/ AST  --   --   --  21  --    ALB 2.9* 3.0* 2.9* 2.5*  2.5* 2.4*       Recent Labs   Lab 09/09/20  1222 09/09/20  1713 09/09/20  2158 09/10/20  0751 09/10/20  1203   PGLU 117* 114* 119* 115* 115*       Imaging:  Xr Chest Pa + Lat Chest (cpt=71046)    Resu is located within the right lower lobe and measures 8 mm. VASCULATURE:  Thrombus cannot be excluded without intravenous contrast.  ADRI:  No mass or adenopathy. MEDIASTINUM:  Multiple scattered nodes may be reactive.   CARDIAC:  Coronary artery calcificat deep white matter ischemic change in the periventricular deep white matter. No extra-axial fluid collections. No midline shift. The basal cisterns are normal and patent. Moderate vascular  calcification at the skull base.  SINUSES:           No sign of pulmonary edema from congestive failure, with atypical pneumonia from COVID-19 infection or other etiologies not entirely excluded. Clinical correlation recommended. Trace left pleural effusion. No pneumothorax. Degenerative changes in the spine.   Calc per tab 1 tablet, 1 tablet, Oral, Q4H PRN    Or  HYDROcodone-acetaminophen (NORCO)  MG per tab 2 tablet, 2 tablet, Oral, Q4H PRN  morphINE sulfate (PF) 2 MG/ML injection 2 mg, 2 mg, Intravenous, Q2H PRN    Or  morphINE sulfate (PF) 2 MG/ML injection 1-10 mcg/min, Intravenous, Continuous  Midazolam HCl (VERSED) 2 MG/2ML injection 1 mg, 1 mg, Intravenous, Q30 Min PRN  Doxycycline Hyclate (VIBRAMYCIN) cap 100 mg, 100 mg, Oral, 2 times per day  Bismuth Subsalicylate (PEPTO-BISMOL) chew tab CHEW 262 mg, 1   [facility]  [phone]

## 2020-09-10 NOTE — PROGRESS NOTES
Patient:   NAMITA MENDOZA            MRN: [id number]            FIN: [id number]              Age:   68 years     Sex:  MALE     :  54   Associated Diagnoses:   None   Author:   SUSHILA ESPARAZ     Subjective   Patient concerned about voiding and catheterization if needed.  He was voiding okay on the other unit.  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  Last scan 200 cc  Right lower extremity pain residual limb is stable.  Patient ready to begin full therapy program.       Health Status   Current medications:    Medications (31) Active  Scheduled: (19)  Abacavir 300 mg tab  600 mg 2 tab, Oral, Q Bedtime  Allopurinol 300 mg tab  300 mg 1 tab, Oral, BID [with breakfast & dinner]  ApixaBAN 5 mg tab  5 mg 1 tab, Oral, Q12H  Atorvastatin 20 mg tab  20 mg 1 tab, Oral, Daily  Benzonatate 100 mg cap  200 mg 2 cap, Oral, Q8H  Carvedilol 25 mg tab  25 mg 1 tab, Oral, Q12H  CloNIDine 0.2 mg tab  0.2 mg 1 tab, Oral, Q12H  Dolutegravir 50 mg tab  50 mg 1 tab, Oral, Q Bedtime  HydrALAZINE 50 mg tab  50 mg 1 tab, Oral, Q8H  insulin glargine  12 unit 0.12 mL, Subcutaneous, Q Bedtime  Insulin human lispro 1 unit/0.01 mL inj  1-6 units, Subcutaneous, QID [with meals & HS]  LamiVUDine 150 mg tab  150 mg 1 tab, Oral, Q Evening  Lidocaine 4% (40 mg/gm) patch  1 patch, TransDermal, Daily  Lidocaine 4% (40 mg/gm) patch  1 patch, TransDermal, Daily  Memantine 10 mg tab  20 mg 2 tab, Oral, Q12H  Metaxalone 800 mg tab  800 mg 1 tab, Oral, Q8H  Mirtazapine 15 mg tab  15 mg 1 tab, Oral, Q Bedtime  NIFEdipine 90 mg XL tab  90 mg 1 tab, Oral, Daily  Pantoprazole 40 mg DR tab  40 mg 1 tab, Oral, Daily  Continuous: (0)  PRN: (12)  Acetaminophen 500 mg tab  500 mg 1 tab, Oral, Q8H  Acetaminophen 650 mg/20.3 mL oral liquid UD  650 mg 20.3 mL, Oral, Q4H  Bisacodyl 10 mg suppos  10 mg 1 suppository, Rectal, Daily  Calcium carbonate 500 mg chew tab [Ca 200 mg]  500 mg 1 tab, Chewed, BID  Dextrose  [facility]  Endocrinology Progress Note    Deena Stoner Patient Status:  Inpatient    1935 MRN [id number]   [facility] 6NE-A Attending Maria Hogan MD   [address] Day # 9 PCP Inessa Orourke MD     Subjective:  He dislikes hospit (glucose) 40% 15 gm/37.5 gm oral gel UD  15 gm, Oral, As Directed PRN  Dextrose (glucose) 50% 25 gm/50 mL syringe  12.5 gm 25 mL, IV Push, As Directed PRN  Docusate sodium 283 mg enema 5 mL UD  1 application, Rectal, Q Evening  Glucagon 1 mg/1 mL emergency kit SDV  1 mg 1 mL, IM, As Directed PRN  Hydrocodone-acetaminophen 7.5-325 mg tab  1 tab, Oral, Q6H  Milk of magnesia 8% 30 mL oral susp UD  30 mL, Oral, Daily  Ondansetron 4 mg disintegrating tab  4 mg 1 tab, Oral, Q6H  Simethicone 80 mg chew tab  80 mg 1 tab, Chewed, TID [with meals]      Objective   VS/Measurements     Vitals between:   28-NOV-2019 09:14:48   TO   29-NOV-2019 09:14:48                   LAST RESULT MINIMUM MAXIMUM  Temperature 36.9 36.4 37  Heart Rate 86 75 89  Respiratory Rate 16 16 16  NISBP           161 139 161  NIDBP           90 81 90  SpO2                    97 97 97    Alert cooperative no acute distress  Lungs clear  Heart regular  Right residual limb with staples no increasing redness along the incision margins no drainage  Left lower extremity no significant edema     Results Review   General results   Interpretation:     Impression and Plan   1.  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  Peripheral arterial disease with nonhealing right foot wound  3.  Status post first and second ray amputation 10/8/2019  4.  Fever and leukocytosis– due to pancreatitis  5.  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  Chronic kidney disease  7.  Mild dementia  8.  Left lower extremity DVT/PE on apixaban prior to admission  9.  Paroxysmal atrial fibrillation on apixaban prior to admission  10.  Pancreatitis with tachycardia, hypertension, leukocytosis on CLD  11.  Obstipation  12.  Diabetes with diabetic polyneuropathy  13.  Postop blood loss anemia  14.  Mobility and ADL dysfunction  Impairment group code: 5.4       Etiologic diagnosis:PVD  REHAB:preprosthetic Rx and full rehab services  ID: moniter for s/s of post op wound infection, HIV on meds.  CARD:dvt and PE  09/07/20  2007 09/08/20  1221 09/08/20  1303 09/08/20  1408 09/08/20  1507 09/08/20  1605 09/08/20  1658 09/08/20  1804 09/08/20  1904 09/08/20  2013 09/08/20  2102 09/08/20  2201 09/08/20  2309 09/09/20  0013 09/09/20  0113 09/09/20  0331 09/09/20  0424 0 on AC.  NEURO: stable dementia on mementine  PULM:ok on RA, cont AC full dose  HEME: anemic, post op, will follow  GI: Continue bowel program  : We will allow volume up to 300 prior to cathing considering history of large capacity.  SKIN: Nursing to monitor for any issues with skin integrity.  VASC: Continue eliquis at prophylactic dosage.  ELECTROLYTES:  Will monitor and make adjustments as needed.   NUTRITION:cardiac  Plan of care done today

## 2020-09-10 NOTE — PROGRESS NOTES
[facility]  Progress Note    Sugey Weber Patient Status:  Inpatient    1935 MRN [id number]   [facility] 2NE-A Attending Marthenia Goodell, MD   [facility] Day # 9 PCP Rola Mejia MD     Subjective:   Comfortabl Human, DOBUTamine, Nitroglycerin in D5W, norepinephrine, nitroprusside (NIPRIDE) 50 mg in D5W infusion, PEG 3350, bisacodyl, ondansetron HCl, glucose **OR** Glucose-Vitamin C **OR** dextrose **OR** glucose **OR** Glucose-Vitamin C, Midazolam HCl, Bismuth S 09/08/20  1215  09/08/20  2237 09/09/20  0429 09/10/20  0442   GLU 88 103*  --   --  104*  104* 104*   BUN 30* 31*  --   --  24*  23* 33*   CREATSERUM 5.78* 5.71*  --   --  4.52*  4.17* 5.88*   GFRAA 10* 10*  --   --  13*  14* 9*   GFRNAA 8* 8*  --   --  1 gradient (S): 12mm Hg. Peak gradient (S): 23mm Hg. 3. Mitral valve: Mildly calcified annulus. There is mild regurgitation. 4. Left atrium: The atrium is markedly dilated.   5. Right ventricle: Estimation of the right ventricular systolic pressure is     w

## 2020-09-10 NOTE — PLAN OF CARE
Assumed pt care around 1900. VSS. Pt in bed, A/Ox4 and can follow commands. Pt in NSR with a RBBB. A pericardial rub and murmur is heard on auscultation. Radial and pedal pulses palpable. Lung sounds clear/diminished on RA. IS encouraged. BS active.  Denys activity/tolerance for mobility and gait  - Educate and engage patient/family in tolerated activity level and precautions  DC from PT as pt is at baseline function.    Outcome: Progressing     Problem: CARDIOVASCULAR - ADULT  Goal: Maintains optimal cardiac retention  Outcome: Progressing     Problem: METABOLIC/FLUID AND ELECTROLYTES - ADULT  Goal: Glucose maintained within prescribed range  Description  INTERVENTIONS:  - Monitor Blood Glucose as ordered  - Assess for signs and symptoms of hyperglycemia and h hemorrhage  - Monitor labs and vital signs for trends  - Administer supportive blood products/factors, fluids and medications as ordered and appropriate  - Administer supportive blood products/factors as ordered and appropriate  Outcome: Progressing     Pr

## 2020-09-10 NOTE — CM/SW NOTE
09/10/20 1100   CM/SW Referral Data   Referral Source    Reason for Referral Discharge planning   Informant Patient;Spouse   Patient Info   Patient's Mental Status Alert;Oriented   Patient's [address]   Number of Levels in Home

## 2020-09-10 NOTE — CM/SW NOTE
PT saw pt and they are recommending HHPT. Referral sent to [facility] at [facility] - she will see pt and find out if they can go to [facility].  She also will find out if they are in network with pt's [address] [place] [address].

## 2020-09-10 NOTE — PHYSICAL THERAPY NOTE
Physical Therapy  Pt attempted in AM but currently having dialysis - will re attempt as schedule allows.

## 2020-09-10 NOTE — OCCUPATIONAL THERAPY NOTE
OCCUPATIONAL THERAPY EVALUATION - INPATIENT     Room Number: 2505/4397-H  Evaluation Date: 9/10/2020  Type of Evaluation: Initial  Presenting Problem: CABG    Physician Order: IP Consult to Occupational Therapy  Reason for Therapy: ADL/IADL Dysfunction and Performed by Nakia Ortega MD at [address]   • OTHER      AV fistula creation-Nov/2016   • REVISION AV FISTULA      January/2017   • SPINE SURGERY PROCEDURE UNLISTED     • [address] ASSESSMENT  AM-PAC ‘6-Clicks’ Inpatient Daily Activity Short Form  How much help from another person does the patient currently need…  -   Putting on and taking off regular lower body clothing?: A Lot  -   Bathing (including washing, rinsing, drying)?: A L deficits, maximizing patient’s ability to return safely to his prior level of function.     Patient Complexity  Occupational Profile/Medical History MODERATE - Expanded review of history including review of medical or therapy record   Specific performance d techniques

## 2020-09-10 NOTE — PHYSICAL THERAPY NOTE
PHYSICAL THERAPY EVALUATION - INPATIENT     Room Number: 5344/3720-V  Evaluation Date: 9/10/2020  Type of Evaluation: Re-evaluation  Physician Order: PT Eval and Treat    Presenting Problem: s/p CABG x3  Reason for Therapy: Mobility Dysfunction and D Performed by Dwayne Correa MD at [facility] [facility]   • OTHER      AV fistula creation-Nov/2016   • REVISION AV FISTULA      January/2017   • SPINE SURGERY PROCEDURE UNLISTED     • TOTAL KNEE REPLACEMENT         HOME SITUATION  Type of Home Little   -   Moving from lying on back to sitting on the side of the bed?: A Little   How much help from another person does the patient currently need. ..   -   Moving to and from a bed to a chair (including a wheelchair)?: A Little   -   Need to walk in h therapy include CORBIN,DM II, CKD,HTN, obesity, OA, hx bilat TKR (2019). In this PT evaluation, the patient presents with the following impairments decreased aerobic capacity.   Functional outcome measures completed include The AM-PAC '6-Clicks' Inpatient Bas

## 2020-09-10 NOTE — DIETARY NOTE
[facility]    NUTRITION ASSESSMENT    Pt does not meet malnutrition criteria.     NUTRITION DIAGNOSIS/PROBLEM:    Increased nutrient needs related to increased demands of healing as evidenced by CABG on 9/8 - ongoing     NUTRITION INTERVENTION:       1 lb)  09/26/19 : 90.7 kg (200 lb)  09/10/19 : 88.9 kg (196 lb)  08/13/19 : 89.4 kg (197 lb)    NUTRITION:  Diet: 1800 CC / Renal  Oral Supplements: Ensure Clear    FOOD/NUTRITION RELATED HISTORY:  Appetite: Poor  Intake: 25%  Intake Meeting Needs: No, but s

## 2020-09-10 NOTE — PROGRESS NOTES
Pulmonary Progress Note        NAME: Elva Mohamud Street: 0147/5315-K - MRN: [id number] - Age: 82year old - : 1935        Last 24hrs: No events overnight, sitting up in bed getting HD    OBJECTIVE:   09/10/20  0500 09/10/20  0600 09/10/20  070 clear to auscultation bilaterally  Heart: S1, S2 normal, no murmur, click, rub or gallop, regular rate and rhythm  Abdomen: soft, non-tender; bowel sounds normal; no masses,  no organomegaly  Extremities: extremities normal, atraumatic, no cyanosis or mabel

## 2020-09-11 ENCOUNTER — APPOINTMENT (OUTPATIENT)
Dept: GENERAL RADIOLOGY | Facility: HOSPITAL | Age: 85
DRG: 233 | End: 2020-09-11
Attending: NURSE PRACTITIONER
Payer: MEDICARE

## 2020-09-11 LAB
ALBUMIN SERPL-MCNC: 2.4 G/DL (ref 3.4–5)
ANION GAP SERPL CALC-SCNC: 4 MMOL/L (ref 0–18)
BASOPHILS # BLD AUTO: 0.03 X10(3) UL (ref 0–0.2)
BASOPHILS NFR BLD AUTO: 0.2 %
BLOOD TYPE BARCODE: 6200
BLOOD TYPE BARCODE: 6200
BUN BLD-MCNC: 26 MG/DL (ref 7–18)
BUN/CREAT SERPL: 5.2 (ref 10–20)
CALCIUM BLD-MCNC: 8.8 MG/DL (ref 8.5–10.1)
CHLORIDE SERPL-SCNC: 103 MMOL/L (ref 98–112)
CO2 SERPL-SCNC: 30 MMOL/L (ref 21–32)
CREAT BLD-MCNC: 4.99 MG/DL (ref 0.7–1.3)
DEPRECATED RDW RBC AUTO: 71.6 FL (ref 35.1–46.3)
EOSINOPHIL # BLD AUTO: 0.23 X10(3) UL (ref 0–0.7)
EOSINOPHIL NFR BLD AUTO: 1.8 %
ERYTHROCYTE [DISTWIDTH] IN BLOOD BY AUTOMATED COUNT: 18.9 % (ref 11–15)
GLUCOSE BLD-MCNC: 100 MG/DL (ref 70–99)
GLUCOSE BLD-MCNC: 102 MG/DL (ref 70–99)
GLUCOSE BLD-MCNC: 117 MG/DL (ref 70–99)
GLUCOSE BLD-MCNC: 129 MG/DL (ref 70–99)
GLUCOSE BLD-MCNC: 204 MG/DL (ref 70–99)
GLUCOSE BLD-MCNC: 87 MG/DL (ref 70–99)
HAV IGM SER QL: 2 MG/DL (ref 1.6–2.6)
HCT VFR BLD AUTO: 28.5 % (ref 39–53)
HGB BLD-MCNC: 9 G/DL (ref 13–17.5)
IMM GRANULOCYTES # BLD AUTO: 0.06 X10(3) UL (ref 0–1)
IMM GRANULOCYTES NFR BLD: 0.5 %
LYMPHOCYTES # BLD AUTO: 0.82 X10(3) UL (ref 1–4)
LYMPHOCYTES NFR BLD AUTO: 6.6 %
MCH RBC QN AUTO: 32.7 PG (ref 26–34)
MCHC RBC AUTO-ENTMCNC: 31.6 G/DL (ref 31–37)
MCV RBC AUTO: 103.6 FL (ref 80–100)
MONOCYTES # BLD AUTO: 1.13 X10(3) UL (ref 0.1–1)
MONOCYTES NFR BLD AUTO: 9 %
NEUTROPHILS # BLD AUTO: 10.22 X10 (3) UL (ref 1.5–7.7)
NEUTROPHILS # BLD AUTO: 10.22 X10(3) UL (ref 1.5–7.7)
NEUTROPHILS NFR BLD AUTO: 81.9 %
OSMOLALITY SERPL CALC.SUM OF ELEC: 289 MOSM/KG (ref 275–295)
PHOSPHATE SERPL-MCNC: 3.7 MG/DL (ref 2.5–4.9)
PLATELET # BLD AUTO: 109 10(3)UL (ref 150–450)
POTASSIUM SERPL-SCNC: 4.2 MMOL/L (ref 3.5–5.1)
RBC # BLD AUTO: 2.75 X10(6)UL (ref 3.8–5.8)
SODIUM SERPL-SCNC: 137 MMOL/L (ref 136–145)
WBC # BLD AUTO: 12.5 X10(3) UL (ref 4–11)

## 2020-09-11 PROCEDURE — 82962 GLUCOSE BLOOD TEST: CPT

## 2020-09-11 PROCEDURE — 97116 GAIT TRAINING THERAPY: CPT

## 2020-09-11 PROCEDURE — 71045 X-RAY EXAM CHEST 1 VIEW: CPT | Performed by: NURSE PRACTITIONER

## 2020-09-11 PROCEDURE — 80069 RENAL FUNCTION PANEL: CPT | Performed by: PHYSICIAN ASSISTANT

## 2020-09-11 PROCEDURE — 83735 ASSAY OF MAGNESIUM: CPT | Performed by: PHYSICIAN ASSISTANT

## 2020-09-11 PROCEDURE — 97110 THERAPEUTIC EXERCISES: CPT

## 2020-09-11 PROCEDURE — 97530 THERAPEUTIC ACTIVITIES: CPT

## 2020-09-11 PROCEDURE — 85025 COMPLETE CBC W/AUTO DIFF WBC: CPT | Performed by: INTERNAL MEDICINE

## 2020-09-11 RX ORDER — CARVEDILOL 3.12 MG/1
3.12 TABLET ORAL 2 TIMES DAILY WITH MEALS
Status: DISCONTINUED | OUTPATIENT
Start: 2020-09-11 | End: 2020-09-14

## 2020-09-11 NOTE — CARDIAC REHAB
Initiated CAD education with pt and his wife, offered phase 2 CR. Pt prefers to go to [facility] in [facility] for CR.

## 2020-09-11 NOTE — PROGRESS NOTES
[facility] Hospitalist Progress Note     [facility]      SUBJECTIVE:  Denies cp and sob. No dizziness. Transferring to ctu.  Walked all the way down hallway - doing well    OBJECTIVE:  Temp:  [98 °F (36.7 °C)-99.1 °F (37.3 °C)] 98 °F (36.7 °C)  Pulse:  [60- Recent Labs   Lab 09/07/20  0407 09/08/20  0443 09/09/20  0429 09/10/20  0442 09/11/20  0423   ALT  --   --  13*  --   --    AST  --   --  21  --   --    ALB 3.0* 2.9* 2.5*  2.5* 2.4* 2.4*       Recent Labs   Lab 09/10/20  1721 09/10/20  2049 09/10/2 bronchiectasis with associated nonspecific ground-glass density. Multiple scattered small nodular ground-glass densities, the largest is located within the right lower lobe and measures 8 mm.   VASCULATURE:  Thrombus cannot be excluded without intravenous FINDINGS:  VENTRICLES/SULCI:  Moderate atrophy without hydrocephalus. INTRACRANIAL:  No hemorrhage. No acute infarct. Mild chronic deep white matter ischemic change in the periventricular deep white matter. No extra-axial fluid collections.   No midlin opacities throughout the bilateral lungs. Cardiomegaly with mild pulmonary vascular congestion.   The overall findings could be due to pulmonary edema from congestive failure, with atypical pneumonia from COVID-19 infection or other etiologies not entirely Once  Insulin Aspart Pen (NOVOLOG) 100 UNIT/ML flexpen 1-40 Units, 1-40 Units, Subcutaneous, TID CC and HS  0.9% NaCl infusion, , Intravenous, Continuous  HYDROcodone-acetaminophen (NORCO)  MG per tab 1 tablet, 1 tablet, Oral, Q4H PRN    Or  HYDROcod liquid 30 g, 30 g, Oral, Q15 Min PRN    Or  Glucose-Vitamin C (DEX-4) chewable tab 8 tablet, 8 tablet, Oral, Q15 Min PRN  EPINEPHrine HCl (ADRENALIN) 4 mg in sodium chloride 0.9% 250 mL infusion, 1-10 mcg/min, Intravenous, Continuous  Midazolam HCl (VERSED Hyponatremia  - pt with ESRD, HD MWF    Prophy:  DVT: heparin subQ when ok per surgery    Dispo: CTU    Jada Hanks [facility] Hospitalist  [phone]

## 2020-09-11 NOTE — PLAN OF CARE
Assumed patient care this morning around 0730 am. Patient is alert and oriented, vital signs are WNL. Pedal pulses palpable. Patients right groin area is bruised from previous angiogram, small hematoma noted. Temporary pacing wires DC.  Patient tolerating d

## 2020-09-11 NOTE — HOME CARE LIAISON
Received referral from [employer]. Met with patient at the bedside to discuss home health services and offer choice. Patient is agreeable to [facility] services at discharge. Brochure and contact information provided. Any questions addressed. Will follow.

## 2020-09-11 NOTE — PROGRESS NOTES
[facility]  CV Surgery Progress Note    Deena Stoner Patient Status:  Inpatient    1935 MRN [id number]   [facility] 6NE-A Attending Maria Hogan MD   Hosp Day # 10 PCP Inessa Orourke MD     Subjective:  Ambulated in the soniya of the examination are without significant   change when compared to chest radiograph performed earlier at 0447 hours. CONCLUSION:    1. Removal of supporting tubes and catheters as detailed above without pneumothorax.     Assessment/Plan:   # POD 3 s/p CA

## 2020-09-11 NOTE — PHYSICAL THERAPY NOTE
PHYSICAL THERAPY TREATMENT NOTE - INPATIENT    Room Number: 4380/7319-F     Session: 1   Number of Visits to Meet Established Goals: 5    Presenting Problem: s/p CABG x3  History related to current admission: Pt is 82year old male admitted on 9/1/2020 fr KNEE REPLACEMENT         SUBJECTIVE  \" I don't have any pain. \"    Patient’s self-stated goal is to walk.     OBJECTIVE  Precautions: Sternal;Cardiac;Limb alert - left;Hard of hearing    WEIGHT BEARING RESTRICTION                   PAIN ASSESSMENT   Rating too far, sternal precautions and activity recommendation.        Exercises Repetitions       LOWER EXTREMITY    Ankle pumps 10   Ankle circles 10   Heel Raises - bilateral 10   Partial Arc Quad - alternating 10   Seated Marching 10   Mini Squats (hip hinge) established on 9/10/2020

## 2020-09-11 NOTE — PROGRESS NOTES
[facility]  Endocrinology Progress Note    Thuy Melendrez Patient Status:  Inpatient    1935 MRN [id number]   [facility] 6NE-A Attending Fabiana Romero MD   [facility] Day # 10 PCP Chantelle York MD     Subjective:  No acute overnigh Emotionally abused: Not on file        Physically abused: Not on file        Forced sexual activity: Not on file    Other Topics      Concerns:         Service: Not Asked        Blood Transfusions: Not Asked        Caffeine Concern: Not Asked 4.2 09/11/2020     09/11/2020    CO2 30.0 09/11/2020     09/11/2020    CA 8.8 09/11/2020    ALB 2.4 09/11/2020    MG 2.0 09/11/2020    PHOS 3.7 09/11/2020    PGLU 152 09/10/2020       Recent Labs     09/06/20  1707 09/06/20  1735 09/06/20  204

## 2020-09-11 NOTE — PLAN OF CARE
Assumed pt care at [facility]. Pt in chair, ambulated in hallway with walker, tolerated well. Denies pain. Goal -180, no issues. IS encouraged. Poor appetite. MSI, Righ groin bruising, palpable hematoma, no pain, palpable pulses.  INDIA fistula, bruit preskareen

## 2020-09-11 NOTE — PROGRESS NOTES
[facility]  Progress Note    Victor Mjean-pierre Fields Patient Status:  Inpatient    1935 MRN [id number]   [facility] 2NE-A Attending Pedrito Davies MD   Hosp Day # 10 PCP Shanika Zelaya MD     Subjective:   Feels bet glucose **OR** Glucose-Vitamin C, Midazolam HCl, Bismuth Subsalicylate, ipratropium-albuterol    Intake/Output:     Intake/Output Summary (Last 24 hours) at 9/11/2020 0833  Last data filed at 9/10/2020 1600  Gross per 24 hour   Intake 837.5 ml   Output 100 ALB 2.5*  2.5* 2.4* 2.4*   *  136 134* 137   K 4.3  4.4 5.7* 4.2     103 102 103   CO2 27.0  25.0 25.0 30.0   ALKPHO 78  --   --    AST 21  --   --    ALT 13*  --   --    BILT 0.7  --   --    TP 5.3*  --   --        Recent Labs   Lab 09/08/20 9/4/20:     LM: 95% with filling defect and calcium. LAD: Ostial disease from LM. CX: Ostail 95%, probably co-dominant vessel. RCA: Occluded. May be non-dominant.   Limited pictures given severe disease.     RA: 0  RV35/2  PA 31/10  PCW 8  CO 6.2

## 2020-09-11 NOTE — PLAN OF CARE
Assumed care at 1500  A&O x 4  R lung crackles noted in bases. L lung crackles throughout diminsihed. Per previous RN CXR done in am (-). No SOB, O2 stable  HR NS on tele.  Friction rub noted   Meds given per orders  Urine output adequate  Dressings and sara

## 2020-09-11 NOTE — PROGRESS NOTES
NURSING ADMISSION NOTE      Patient admitted via Wheelchair  Oriented to room. Safety precautions initiated. Bed in low position. Call light in reach. Pt transferred from CCU this afternoon. Pt alert and oriented, wife at bedside.  POD#3 CABG, m

## 2020-09-11 NOTE — PROGRESS NOTES
[facility]    Nephrology Progress Note    Victorina Ponce Attending:  Monique Young DO       SUBJECTIVE:     Tolerated HD with 1L UF yesterday  Still low appetite  Was able to take a walk today without issues    PHYSICAL EXAM:     Vital Signs:  B MOPERCENT 9.0 09/11/2020    EOPERCENT 1.8 09/11/2020    BAPERCENT 0.2 09/11/2020    NE 10.22 (H) 09/11/2020    LYMABS 0.82 (L) 09/11/2020    MOABSO 1.13 (H) 09/11/2020    EOABSO 0.23 09/11/2020    BAABSO 0.03 09/11/2020     Lab Results   Component Value Rectal, Daily PRN  ondansetron HCl (ZOFRAN) injection 4 mg, 4 mg, Intravenous, Q6H PRN  mupirocin (BACTROBAN) 2% nasal ointment OINT 1 Application, 1 Application, Nasal, BID  Pantoprazole Sodium (PROTONIX) 40 mg in Sodium Chloride 0.9 % 10 mL IV push, 40 m questions or concerns.         Demetrio Martino MD  [address]  [address]  [address] [place] [address]  [facility]    9/11/2020  12:05 PM

## 2020-09-12 LAB
ALBUMIN SERPL-MCNC: 1.6 G/DL (ref 3.4–5)
ANION GAP SERPL CALC-SCNC: 8 MMOL/L (ref 0–18)
BUN BLD-MCNC: 47 MG/DL (ref 7–18)
BUN/CREAT SERPL: 7.1 (ref 10–20)
CALCIUM BLD-MCNC: 8.6 MG/DL (ref 8.5–10.1)
CHLORIDE SERPL-SCNC: 104 MMOL/L (ref 98–112)
CO2 SERPL-SCNC: 20 MMOL/L (ref 21–32)
CREAT BLD-MCNC: 6.66 MG/DL (ref 0.7–1.3)
DEPRECATED RDW RBC AUTO: 75.4 FL (ref 35.1–46.3)
ERYTHROCYTE [DISTWIDTH] IN BLOOD BY AUTOMATED COUNT: 18.1 % (ref 11–15)
GLUCOSE BLD-MCNC: 93 MG/DL (ref 70–99)
HAV IGM SER QL: 2.2 MG/DL (ref 1.6–2.6)
HCT VFR BLD AUTO: 30.1 % (ref 39–53)
HGB BLD-MCNC: 8.7 G/DL (ref 13–17.5)
MCH RBC QN AUTO: 32.8 PG (ref 26–34)
MCHC RBC AUTO-ENTMCNC: 28.9 G/DL (ref 31–37)
MCV RBC AUTO: 113.6 FL (ref 80–100)
OSMOLALITY SERPL CALC.SUM OF ELEC: 286 MOSM/KG (ref 275–295)
PHOSPHATE SERPL-MCNC: 4.7 MG/DL (ref 2.5–4.9)
PLATELET # BLD AUTO: 124 10(3)UL (ref 150–450)
POTASSIUM SERPL-SCNC: 4.3 MMOL/L (ref 3.5–5.1)
RBC # BLD AUTO: 2.65 X10(6)UL (ref 3.8–5.8)
SODIUM SERPL-SCNC: 132 MMOL/L (ref 136–145)
WBC # BLD AUTO: 10.4 X10(3) UL (ref 4–11)

## 2020-09-12 PROCEDURE — 90935 HEMODIALYSIS ONE EVALUATION: CPT | Performed by: INTERNAL MEDICINE

## 2020-09-12 PROCEDURE — 85027 COMPLETE CBC AUTOMATED: CPT | Performed by: NURSE PRACTITIONER

## 2020-09-12 PROCEDURE — 80069 RENAL FUNCTION PANEL: CPT | Performed by: PHYSICIAN ASSISTANT

## 2020-09-12 PROCEDURE — 83735 ASSAY OF MAGNESIUM: CPT | Performed by: PHYSICIAN ASSISTANT

## 2020-09-12 RX ORDER — SENNOSIDES 8.6 MG
8.6 TABLET ORAL 2 TIMES DAILY PRN
Status: DISCONTINUED | OUTPATIENT
Start: 2020-09-12 | End: 2020-09-14

## 2020-09-12 NOTE — PROGRESS NOTES
[facility]  Endocrinology Progress Note    Perla Calvert Patient Status:  Inpatient    1935 MRN [id number]   [facility] 6NE-A Attending Valdez Carroll MD   [facility] Day # 11 PCP Priyank Shearer MD     Subjective:  No acute overnigh Physically abused: Not on file        Forced sexual activity: Not on file    Other Topics      Concerns:         Service: Not Asked        Blood Transfusions: Not Asked        Caffeine Concern: Not Asked        Occupational Exposure: Not Asked  09/12/2020    K 4.3 09/12/2020     09/12/2020    CO2 20.0 09/12/2020    GLU 93 09/12/2020    CA 8.6 09/12/2020    ALB 1.6 09/12/2020    MG 2.2 09/12/2020    PHOS 4.7 09/12/2020    PGLU 117 09/11/2020       Recent Labs     09/06/20  1707 09/06/

## 2020-09-12 NOTE — PROGRESS NOTES
[facility] Hospitalist Progress Note     [facility]      SUBJECTIVE: Resting comfortably. No pain currently while lying in bed. No BM since admission. He plans to try prune juice toda, declining other medications.      OBJECTIVE:  Temp:  [98 °F (36.7 °C)-98 09/11/20  1607 09/11/20  2153   PGLU 102* 204* 129* 87 117*       Imaging:  Xr Chest Pa + Lat Chest (cpt=71046)    Result Date: 9/4/2020  PROCEDURE:  XR CHEST PA + LAT CHEST (CPT=71046)  INDICATIONS:  hypoxia  COMPARISON:  EDWARD , XR, XR CHEST AP PORTABLE No mass or adenopathy. MEDIASTINUM:  Multiple scattered nodes may be reactive. CARDIAC:  Coronary artery calcifications. PLEURA:  No mass or effusion. THORACIC AORTA:  Atherosclerosis. CHEST WALL:  No mass or axillary adenopathy.   LIMITED ABDOMEN:  Ayesha Ramirez basal cisterns are normal and patent. Moderate vascular  calcification at the skull base. SINUSES:           No sign of acute sinusitis. MASTOIDS:          No sign of acute inflammation. SKULL:             No evidence for fracture or osseous abnormality. Clinical correlation recommended. Trace left pleural effusion. No pneumothorax. Degenerative changes in the spine. Calcified plaque in the tortuous thoracic aorta.             CONCLUSION:  Interval development of mixed interstitial and ground-glass opac Or  HYDROcodone-acetaminophen (NORCO)  MG per tab 2 tablet, 2 tablet, Oral, Q4H PRN  morphINE sulfate (PF) 2 MG/ML injection 2 mg, 2 mg, Intravenous, Q2H PRN    Or  morphINE sulfate (PF) 2 MG/ML injection 4 mg, 4 mg, Intravenous, Q2H PRN    Or  morph Continuous  Midazolam HCl (VERSED) 2 MG/2ML injection 1 mg, 1 mg, Intravenous, Q30 Min PRN  Doxycycline Hyclate (VIBRAMYCIN) cap 100 mg, 100 mg, Oral, 2 times per day  Bismuth Subsalicylate (PEPTO-BISMOL) chew tab CHEW 262 mg, 1 tablet, Oral, TID PRN  ipra ok per surgery    Dispo: Cinda Rodriguez MD  [facility] IM Hospitalist  Answering service: [phone]

## 2020-09-12 NOTE — PLAN OF CARE
Assumed care at 0730. Pt A&O x4. O2 saturation 99% on RA. Sinus Radha Melita on tele. Last BM 9/5, medication interventions to stimulate BM were given. Pt denies pain at this time. Pt ambulates with SBA. POC: CABG Post-op day 4, HD today, continue to monitor. hemodynamic stability  - Monitor arterial and/or venous puncture sites for bleeding and/or hematoma  - Assess quality of pulses, skin color and temperature  - Assess for signs of decreased coronary artery perfusion - ex.  Angina  - Evaluate fluid balance, a Electrolytes maintained within normal limits  Description  INTERVENTIONS:  - Monitor labs and rhythm and assess patient for signs and symptoms of electrolyte imbalances  - Administer electrolyte replacement as ordered  - Monitor response to electrolyte rep ambulation using safe patient handling equipment as needed  - Ensure adequate protection for wounds/incisions during mobilization  - Obtain PT/OT consults as needed  - Advance activity as appropriate  - Communicate ordered activity level and limitations wi

## 2020-09-12 NOTE — PROGRESS NOTES
[facility]   [facility] Progress Note    Sagar Mares Patient Status:  Inpatient    1935 MRN [id number]   [facility] 8NE-A Attending Jarrell Sharp MD   [facility] Day # 11 PCP Tory Rodgers MD     Subjective:    Up walking hallway this Or  morphINE sulfate (PF) 2 MG/ML injection 4 mg, 4 mg, Intravenous, Q2H PRN    Or  morphINE sulfate (PF) 2 MG/ML injection 6 mg, 6 mg, Intravenous, Q2H PRN  melatonin tab 3 mg, 3 mg, Oral, Nightly PRN  Albumin Human (ALBUMINAR) 5 % solution 250 mL, 250 mL Subsalicylate (PEPTO-BISMOL) chew tab CHEW 262 mg, 1 tablet, Oral, TID PRN  ipratropium-albuterol (DUONEB) nebulizer solution 3 mL, 3 mL, Nebulization, Q6H PRN  lidocaine-menthol 4-1 % 1 patch, 1 patch, Transdermal, Daily  atorvastatin (LIPITOR) tab 20 mg, TKR (total knee replacement), left     Occult blood in stools     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 9)     Cardia

## 2020-09-12 NOTE — PLAN OF CARE
Received patient on bed, alert and oriented. Denied chest pain, denied SOB. Discussed POC. Due meds given. Reminded to use incentive spirometer 10X/hr while awake. Desat to 88% on room air while sleeping. Placed on O2 2l/NC while sleeping.  Safety measures signs of decreased cardiac output  - Evaluate effectiveness of vasoactive medications to optimize hemodynamic stability  - Monitor arterial and/or venous puncture sites for bleeding and/or hematoma  - Assess quality of pulses, skin color and temperature  - consult as needed  - Instruct patient on self management of diabetes  Outcome: Progressing  Goal: Electrolytes maintained within normal limits  Description  INTERVENTIONS:  - Monitor labs and rhythm and assess patient for signs and symptoms of electrolyte standing, transferring and ambulating w/ or w/o assistive devices  - Assist with transfers and ambulation using safe patient handling equipment as needed  - Ensure adequate protection for wounds/incisions during mobilization  - Obtain PT/OT consults as nee

## 2020-09-12 NOTE — PROGRESS NOTES
[facility]    Nephrology Progress Note    Sugey Weber Attending:  Adriane Vegas DO       SUBJECTIVE:     Tolerating HD   Still low appetite but a little better       PHYSICAL EXAM:     Vital Signs: /59 (BP Location: Right arm)   Pulse 09/11/2020    EOPERCENT 1.8 09/11/2020    BAPERCENT 0.2 09/11/2020    NE 10.22 (H) 09/11/2020    LYMABS 0.82 (L) 09/11/2020    MOABSO 1.13 (H) 09/11/2020    EOABSO 0.23 09/11/2020    BAABSO 0.03 09/11/2020     Lab Results   Component Value Date    MALBP 57 ointment OINT 1 Application, 1 Application, Nasal, BID  Pantoprazole Sodium (PROTONIX) EC tab 40 mg, 40 mg, Oral, QAM AC  glucose (DEX4) oral liquid 15 g, 15 g, Oral, Q15 Min PRN    Or  Glucose-Vitamin C (DEX-4) chewable tab 4 tablet, 4 tablet, Oral, Q15 M

## 2020-09-12 NOTE — PROGRESS NOTES
[facility] Cardiology  Progress Note    eDena Stoner Patient Status:  Inpatient    1935 MRN [id number]   [facility] 2NE-A Attending Jose Antonio Vazquez MD   [address] Day # 11 PCP Inessa Orourke MD     Patient seen, examined, HYDROcodone-acetaminophen **OR** HYDROcodone-acetaminophen, morphINE sulfate **OR** morphINE sulfate **OR** morphINE sulfate, melatonin, Albumin Human, DOBUTamine, Nitroglycerin in D5W, norepinephrine, nitroprusside (NIPRIDE) 50 mg in D5W infusion, PEG 3 4.3     103 102 103 104   CO2 27.0  25.0 25.0 30.0 20.0*   ALKPHO 78  --   --   --    AST 21  --   --   --    ALT 13*  --   --   --    BILT 0.7  --   --   --    TP 5.3*  --   --   --        Recent Labs   Lab 09/08/20  1215 09/09/20  0430 09/10/20  04 9/4/20:     LM: 95% with filling defect and calcium. LAD: Ostial disease from LM. CX: Ostail 95%, probably co-dominant vessel. RCA: Occluded. May be non-dominant.   Limited pictures given severe disease.     RA: 0  RV35/2  PA 31/10  PCW 8  CO 6.2

## 2020-09-13 LAB
ALBUMIN SERPL-MCNC: 2.3 G/DL (ref 3.4–5)
ANION GAP SERPL CALC-SCNC: 7 MMOL/L (ref 0–18)
BUN BLD-MCNC: 29 MG/DL (ref 7–18)
BUN/CREAT SERPL: 6.3 (ref 10–20)
CALCIUM BLD-MCNC: 9.1 MG/DL (ref 8.5–10.1)
CHLORIDE SERPL-SCNC: 103 MMOL/L (ref 98–112)
CO2 SERPL-SCNC: 26 MMOL/L (ref 21–32)
CREAT BLD-MCNC: 4.58 MG/DL (ref 0.7–1.3)
GLUCOSE BLD-MCNC: 130 MG/DL (ref 70–99)
GLUCOSE BLD-MCNC: 95 MG/DL (ref 70–99)
HAV IGM SER QL: 2.2 MG/DL (ref 1.6–2.6)
OSMOLALITY SERPL CALC.SUM OF ELEC: 288 MOSM/KG (ref 275–295)
PHOSPHATE SERPL-MCNC: 3.5 MG/DL (ref 2.5–4.9)
POTASSIUM SERPL-SCNC: 3.8 MMOL/L (ref 3.5–5.1)
SODIUM SERPL-SCNC: 136 MMOL/L (ref 136–145)

## 2020-09-13 PROCEDURE — 80069 RENAL FUNCTION PANEL: CPT | Performed by: PHYSICIAN ASSISTANT

## 2020-09-13 PROCEDURE — 83735 ASSAY OF MAGNESIUM: CPT | Performed by: PHYSICIAN ASSISTANT

## 2020-09-13 PROCEDURE — 82962 GLUCOSE BLOOD TEST: CPT

## 2020-09-13 NOTE — PHYSICAL THERAPY NOTE
New PT order given for stair training as of  9/13. Order reconciled as pt is has been seen for skilled PT.  Spoke to nursing pt is not quite medically approp to seen on 9/13/2020 but OK to be seen on 9/14/20

## 2020-09-13 NOTE — PROGRESS NOTES
09/13/20 1150 09/13/20 1152 09/13/20 1155   Vital Signs   Pulse 59 62 61   Heart Rate Source Monitor Monitor  --    Resp 18 18 20   Respiratory Quality  --  Normal  --    /55 116/48 125/49   BP Location  --  Right arm  --    BP Method  --  Jhony Romero

## 2020-09-13 NOTE — PLAN OF CARE
Pt laying in bed watching tv states that he has been having lightheadedness. He is due for a bp med so I checked his bp and it is 176/_ gave his medication and told him we would check it again in 30 min prior to getting up for dinner. Tech aware.    Pt nadir

## 2020-09-13 NOTE — PROGRESS NOTES
No [facility]    Nephrology Progress Note    Young Kaiser Attending:  Lus Bloch, DO       SUBJECTIVE:     No BM  Feels weak today  Still low appetite but a little better       PHYSICAL EXAM:     Vital Signs: /49   Pulse 61   Temp 98. EOPERCENT 1.8 09/11/2020    BAPERCENT 0.2 09/11/2020    NE 10.22 (H) 09/11/2020    LYMABS 0.82 (L) 09/11/2020    MOABSO 1.13 (H) 09/11/2020    EOABSO 0.23 09/11/2020    BAABSO 0.03 09/11/2020     Lab Results   Component Value Date    Chester Matter 572.00 05/19/201 g, Oral, Daily PRN  bisacodyl (DULCOLAX) rectal suppository 10 mg, 10 mg, Rectal, Daily PRN  ondansetron HCl (ZOFRAN) injection 4 mg, 4 mg, Intravenous, Q6H PRN  Pantoprazole Sodium (PROTONIX) EC tab 40 mg, 40 mg, Oral, QAM AC  glucose (DEX4) oral liquid 1 Nephrology  [facility]  [address] [facility] Karen, [address]

## 2020-09-13 NOTE — PROGRESS NOTES
[facility]  Progress Note    Fabián Camargo Patient Status:  Inpatient    1935 MRN [id number]   [facility] 2NE-A Attending Shaina Michael MD   Hosp Day # 12 PCP Page MD Alfonzo   Patient seen, examined, a bisacodyl, ondansetron HCl, glucose **OR** Glucose-Vitamin C **OR** dextrose **OR** glucose **OR** Glucose-Vitamin C, Midazolam HCl, Bismuth Subsalicylate, ipratropium-albuterol    Intake/Output:     Intake/Output Summary (Last 24 hours) at 9/13/2020 6474 TP 5.3*  --   --   --   --     < > = values in this interval not displayed.        Recent Labs   Lab 09/08/20  1215 09/09/20  0430 09/10/20  0442 09/11/20  0423 09/12/20  0550   RBC 2.91* 2.47* 2.36* 2.75* 2.65*   HGB 9.5* 8.2* 7.8* 9.0* 8.7*   HCT 29.7* vessel. RCA: Occluded. May be non-dominant. Limited pictures given severe disease.     RA: 0  RV35/2  PA 31/10  PCW 8  CO 6.2                     Assessment:    1.   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

## 2020-09-13 NOTE — PLAN OF CARE
Pt. Alert and o x 4  , on RA. Resp, pattern easy and  non labored , denies SOB , with on and off non productive cough. Tele shows SR/SB on the monitor. S/P CABG x 3 POD 4 ; sternal incision stable , with SS C/D/I.  Pt. Had HD today , with 1 li output rhythm, and trends  - Monitor for bleeding, hypotension and signs of decreased cardiac output  - Evaluate effectiveness of vasoactive medications to optimize hemodynamic stability  - Monitor arterial and/or venous puncture sites for bleeding and/or hematom barriers to adequate nutritional intake and initiate nutrition consult as needed  - Instruct patient on self management of diabetes  Outcome: Progressing  Goal: Electrolytes maintained within normal limits  Description  INTERVENTIONS:  - Monitor labs and r function  Description  INTERVENTIONS:  - Assess patient stability and activity tolerance for standing, transferring and ambulating w/ or w/o assistive devices  - Assist with transfers and ambulation using safe patient handling equipment as needed  - Ensure

## 2020-09-13 NOTE — PROGRESS NOTES
[facility]   [facility] Progress Note    Polo Moore Patient Status:  Inpatient    1935 MRN [id number]   [facility] 8NE-A Attending Ana Lilia Bermeo MD   Hosp Day # 15 PCP Avelino Cervantes MD     Subjective:  Sitting up in chair .  No c Continuous  HYDROcodone-acetaminophen (NORCO)  MG per tab 1 tablet, 1 tablet, Oral, Q4H PRN    Or  HYDROcodone-acetaminophen (NORCO)  MG per tab 2 tablet, 2 tablet, Oral, Q4H PRN  morphINE sulfate (PF) 2 MG/ML injection 2 mg, 2 mg, Intravenous, (PEPTO-BISMOL) chew tab CHEW 262 mg, 1 tablet, Oral, TID PRN  ipratropium-albuterol (DUONEB) nebulizer solution 3 mL, 3 mL, Nebulization, Q6H PRN  lidocaine-menthol 4-1 % 1 patch, 1 patch, Transdermal, Daily  atorvastatin (LIPITOR) tab 20 mg, 20 mg, Oral, breakdown of skin (HCC)     Acute diastolic congestive heart failure (HCC)     Dyspnea on exertion      POD# 5 S/P CABG X2 SVG to LAD , LIMA to Diag, SVG to OM,    - HD stable   - Cards following ASA statin BB  - Post op anemia / ESRD stable follow   - Valentino Section

## 2020-09-13 NOTE — PROGRESS NOTES
[facility] Hospitalist Progress Note     [facility]      SUBJECTIVE: feels punky and a little Lang Dunnings. No BM yet. No abd pain. +flatus. Denies fever, chills, CP, SOB, LH, dizziness.      OBJECTIVE:  Temp:  [97.7 °F (36.5 °C)-98.8 °F (37.1 °C)] 97.7 °F (36.5 9/03/2020, 5:41 AM.  [facility] , XR, XR CHEST AP PORTABLE  (CPT=71045), 9/01/2020, 10:15 AM.  TECHNIQUE:  PA and lateral chest radiographs were obtained.   PATIENT STATED HISTORY: (As transcribed by Technologist)  Patient offered no additional history at this the upper abdomen demonstrate cholelithiasis, small hiatal hernia, a small atrophic left kidney, and right renal low-density masses, the largest is located within the superior pole medially and measures 2.3 cm incompletely characterized on this noncontrast Multiple calcifications within the subcutaneous tissues of the scalp noted may be vascular            CONCLUSION:  Chronic changes. No acute disease.    Dictated by (CST): Laurel Amado MD on 9/01/2020 at 11:13 AM     Finalized by (CST): Yady Darby bilateral lungs. Cardiomegaly with mild pulmonary vascular congestion. The overall findings could be due to pulmonary edema from congestive failure, with atypical pneumonia from COVID-19 infection or other etiologies not entirely excluded.   Clinical juan MG/ML injection 4 mg, 4 mg, Intravenous, Q2H PRN    Or  morphINE sulfate (PF) 2 MG/ML injection 6 mg, 6 mg, Intravenous, Q2H PRN  melatonin tab 3 mg, 3 mg, Oral, Nightly PRN  Albumin Human (ALBUMINAR) 5 % solution 250 mL, 250 mL, Intravenous, Once PRN   300 mg, Oral, Daily  multivitamin (DIALYVITE - RENAL) tab 1 tablet, 1 tablet, Oral, Daily       Assessment/Plan:      82year old male with PMH of DM type II, ESRD on HD, HTN, HLD who presnted with dizziness, weakness and CORBIN.      # Dizziness; + orthostati

## 2020-09-13 NOTE — PROGRESS NOTES
09/13/20 1341  Hemodialysis inpatient Start: 09/14/20 0000, End: 09/14/20 0000, Tomorrow, Routine     Question Answer Comment   K 3 mEq    Ca++ 2.5 mEq    Bicarb 35 mEq    Na 138 mEq    Na+ Modeling no    Dialyzer F160    Dialysate Temperature (C) 36    BF

## 2020-09-13 NOTE — PLAN OF CARE
Received pt at 0730. Alert and oriented x4. On tele with NSR. O2 sat 96% on room air. breath sounds clear. IS currently at 1250. call light in reach, bed is locked and on lowest setting. No c/o chest pain or SOB. No pain at chest incision site.  Pt has not stability  Description  INTERVENTIONS:  - Monitor vital signs, rhythm, and trends  - Monitor for bleeding, hypotension and signs of decreased cardiac output  - Evaluate effectiveness of vasoactive medications to optimize hemodynamic stability  - Monitor ar medications to maintain glucose within target range  - Assess barriers to adequate nutritional intake and initiate nutrition consult as needed  - Instruct patient on self management of diabetes  Outcome: Progressing  Goal: Electrolytes maintained within no function  Description  INTERVENTIONS:  - Assess patient stability and activity tolerance for standing, transferring and ambulating w/ or w/o assistive devices  - Assist with transfers and ambulation using safe patient handling equipment as needed  - Ensure

## 2020-09-13 NOTE — PLAN OF CARE
Spoke with Hermila at dialysis this AM of treatment tomorrow morning 9/14. Stressed importance of early treatment (0600) as discharge is planned after dialysis.

## 2020-09-14 VITALS
HEIGHT: 69 IN | HEART RATE: 62 BPM | WEIGHT: 186.31 LBS | OXYGEN SATURATION: 97 % | TEMPERATURE: 98 F | RESPIRATION RATE: 20 BRPM | DIASTOLIC BLOOD PRESSURE: 63 MMHG | BODY MASS INDEX: 27.6 KG/M2 | SYSTOLIC BLOOD PRESSURE: 148 MMHG

## 2020-09-14 LAB
ALBUMIN SERPL-MCNC: 2.4 G/DL (ref 3.4–5)
ANION GAP SERPL CALC-SCNC: 7 MMOL/L (ref 0–18)
BASOPHILS # BLD AUTO: 0.09 X10(3) UL (ref 0–0.2)
BASOPHILS NFR BLD AUTO: 0.8 %
BUN BLD-MCNC: 43 MG/DL (ref 7–18)
BUN/CREAT SERPL: 6.9 (ref 10–20)
CALCIUM BLD-MCNC: 8.4 MG/DL (ref 8.5–10.1)
CHLORIDE SERPL-SCNC: 101 MMOL/L (ref 98–112)
CO2 SERPL-SCNC: 29 MMOL/L (ref 21–32)
CREAT BLD-MCNC: 6.27 MG/DL (ref 0.7–1.3)
DEPRECATED RDW RBC AUTO: 64.6 FL (ref 35.1–46.3)
EOSINOPHIL # BLD AUTO: 0.52 X10(3) UL (ref 0–0.7)
EOSINOPHIL NFR BLD AUTO: 4.8 %
ERYTHROCYTE [DISTWIDTH] IN BLOOD BY AUTOMATED COUNT: 17.2 % (ref 11–15)
GLUCOSE BLD-MCNC: 104 MG/DL (ref 70–99)
GLUCOSE BLD-MCNC: 105 MG/DL (ref 70–99)
HAV IGM SER QL: 2.3 MG/DL (ref 1.6–2.6)
HCT VFR BLD AUTO: 28.5 % (ref 39–53)
HGB BLD-MCNC: 8.9 G/DL (ref 13–17.5)
IMM GRANULOCYTES # BLD AUTO: 0.07 X10(3) UL (ref 0–1)
IMM GRANULOCYTES NFR BLD: 0.6 %
LYMPHOCYTES # BLD AUTO: 1.02 X10(3) UL (ref 1–4)
LYMPHOCYTES NFR BLD AUTO: 9.4 %
MCH RBC QN AUTO: 32.6 PG (ref 26–34)
MCHC RBC AUTO-ENTMCNC: 31.2 G/DL (ref 31–37)
MCV RBC AUTO: 104.4 FL (ref 80–100)
MONOCYTES # BLD AUTO: 0.9 X10(3) UL (ref 0.1–1)
MONOCYTES NFR BLD AUTO: 8.3 %
NEUTROPHILS # BLD AUTO: 8.25 X10 (3) UL (ref 1.5–7.7)
NEUTROPHILS # BLD AUTO: 8.25 X10(3) UL (ref 1.5–7.7)
NEUTROPHILS NFR BLD AUTO: 76.1 %
OSMOLALITY SERPL CALC.SUM OF ELEC: 295 MOSM/KG (ref 275–295)
PHOSPHATE SERPL-MCNC: 4.2 MG/DL (ref 2.5–4.9)
PLATELET # BLD AUTO: 176 10(3)UL (ref 150–450)
POTASSIUM SERPL-SCNC: 3.9 MMOL/L (ref 3.5–5.1)
RBC # BLD AUTO: 2.73 X10(6)UL (ref 3.8–5.8)
SODIUM SERPL-SCNC: 137 MMOL/L (ref 136–145)
WBC # BLD AUTO: 10.9 X10(3) UL (ref 4–11)

## 2020-09-14 PROCEDURE — 82962 GLUCOSE BLOOD TEST: CPT

## 2020-09-14 PROCEDURE — 97530 THERAPEUTIC ACTIVITIES: CPT

## 2020-09-14 PROCEDURE — 90935 HEMODIALYSIS ONE EVALUATION: CPT | Performed by: INTERNAL MEDICINE

## 2020-09-14 PROCEDURE — 97116 GAIT TRAINING THERAPY: CPT

## 2020-09-14 PROCEDURE — 83735 ASSAY OF MAGNESIUM: CPT | Performed by: PHYSICIAN ASSISTANT

## 2020-09-14 PROCEDURE — 85025 COMPLETE CBC W/AUTO DIFF WBC: CPT | Performed by: HOSPITALIST

## 2020-09-14 PROCEDURE — 80069 RENAL FUNCTION PANEL: CPT | Performed by: PHYSICIAN ASSISTANT

## 2020-09-14 RX ORDER — PANTOPRAZOLE SODIUM 40 MG/1
40 TABLET, DELAYED RELEASE ORAL
Qty: 30 TABLET | Refills: 0 | Status: SHIPPED | OUTPATIENT
Start: 2020-09-15 | End: 2020-10-21

## 2020-09-14 RX ORDER — CARVEDILOL 3.12 MG/1
3.12 TABLET ORAL 2 TIMES DAILY WITH MEALS
Qty: 60 TABLET | Refills: 0 | Status: ON HOLD | OUTPATIENT
Start: 2020-09-14 | End: 2020-10-10

## 2020-09-14 NOTE — PHYSICAL THERAPY NOTE
PHYSICAL THERAPY TREATMENT NOTE - INPATIENT    Room Number: 1020/7395-G     Session: 2  Number of Visits to Meet Established Goals: 5    Presenting Problem: s/p CABG x3    Problem List  Principal Problem:    Acute diastolic congestive [facility] failure [facility] BEARING RESTRICTION                   PAIN ASSESSMENT   Ratin  Location: denies pain       BALANCE                                                                                                                     Static Sitting: Fair +  Dynamic Sitti pattern for ascending stairs but began to get winded and take quicker breaths after 10 stairs. He was educated to perform a step to pattern and performed this intermittently for the remainder of the stairs.  Pt required a 3 minute break at the top of the st Goal #2 Patient is able to demonstrate transfers Sit to/from Stand at assistance level: supervision   MET   Goal #3 Patient is able to ambulate 250 feet with assist device: walker - rolling at assistance level: supervision      Goal #4 Patient is able to

## 2020-09-14 NOTE — PLAN OF CARE
Problem: Diabetes/Glucose Control  Goal: Glucose maintained within prescribed range  Description  INTERVENTIONS:  - Monitor Blood Glucose as ordered  - Assess for signs and symptoms of hyperglycemia and hypoglycemia  - Administer ordered medications to m bleeding, hypotension and signs of decreased cardiac output  - Evaluate effectiveness of vasoactive medications to optimize hemodynamic stability  - Monitor arterial and/or venous puncture sites for bleeding and/or hematoma  - Assess quality of pulses, ski maintained within prescribed range  Description  INTERVENTIONS:  - Monitor Blood Glucose as ordered  - Assess for signs and symptoms of hyperglycemia and hypoglycemia  - Administer ordered medications to maintain glucose within target range  - Assess barri indicated  9/14/2020 1635 by Blank Small RN  Outcome: Completed  9/14/2020 1344 by Blank Small RN  Outcome: Progressing     Problem: HEMATOLOGIC - ADULT  Goal: Maintains hematologic stability  Description  INTERVENTIONS  - Assess for signs and symp

## 2020-09-14 NOTE — PLAN OF CARE
Assumed care of patient around 0730. Pt a/o x 4, RA, Sr on tele monitor w/ occasional pvcs. Denies chest pain/discomfort or SOB. Left thigh donor site clean/dry/intact. Midsternal clean/dry/intact. Refused breakfast and lunch today.  HD this morning, vicente output and hemodynamic stability  Description  INTERVENTIONS:  - Monitor vital signs, rhythm, and trends  - Monitor for bleeding, hypotension and signs of decreased cardiac output  - Evaluate effectiveness of vasoactive medications to optimize hemodynamic hypoglycemia  - Administer ordered medications to maintain glucose within target range  - Assess barriers to adequate nutritional intake and initiate nutrition consult as needed  - Instruct patient on self management of diabetes  Outcome: Progressing  Goal MUSCULOSKELETAL - ADULT  Goal: Return mobility to safest level of function  Description  INTERVENTIONS:  - Assess patient stability and activity tolerance for standing, transferring and ambulating w/ or w/o assistive devices  - Assist with transfers and am

## 2020-09-14 NOTE — PHYSICAL THERAPY NOTE
Attempted for PT this AM.  Pt with dialysis for extended time. Will f/u as schedule permits and pt appropriate.

## 2020-09-14 NOTE — PROGRESS NOTES
[facility]  Progress Note    Saud Hou Patient Status:  Inpatient    1935 MRN [id number]   [facility] 2NE-A Attending Jose Alberto Tavera MD   [facility] Day # 15 PCP Jimmy Medina MD     Subjective:   Feels bet ipratropium-albuterol    Intake/Output:     Intake/Output Summary (Last 24 hours) at 9/14/2020 0856  Last data filed at 9/14/2020 0630  Gross per 24 hour   Intake 370 ml   Output 95 ml   Net 275 ml       Wt Readings from Last 3 Encounters:  09/14/20 : 186 2. 5*  2.5*   < > 1.6* 2.3* 2.4*   *  136   < > 132* 136 137   K 4.3  4.4   < > 4.3 3.8 3.9     103   < > 104 103 101   CO2 27.0  25.0   < > 20.0* 26.0 29.0   ALKPHO 78  --   --   --   --    AST 21  --   --   --   --    ALT 13*  --   --   --   - normal range. 6. Right atrium: The atrium is mildly dilated. 7. Tricuspid valve: There is mild regurgitation. 8. Pericardium, extracardiac: There is no significant pericardial effusion.     Signed             Cath 9/4/20:     LM: 95% with filling defect

## 2020-09-14 NOTE — PROGRESS NOTES
[facility]  CV Surgery Progress Note    Yasmani Preciado Patient Status:  Inpatient    1935 MRN [id number]   [facility] 8NE-A Attending Jennie Calvillo MD   [facility] Day # 15 PCP Ino Franz MD     Subjective:  Patient laying in bed, Diag, SVG to OM)  -HD stable. LVEF 55-60%. Orthostatic - permissive hypertension per cardiology   -post op anemia/tcp - improving   -Vol status ok - HD today   -Pain management - tylenol prn.  Patient prefers not to take norco. He has tramadol at home that

## 2020-09-14 NOTE — PROGRESS NOTES
09/14/20 0515 09/14/20 0518 09/14/20 0521   Vital Signs   BP (!) 173/59 (!) 174/61 136/61   BP Location Right arm Right arm Right arm   BP Method Automatic Automatic Automatic   Patient Position Lying Sitting Standing      Orthostatic b/p

## 2020-09-14 NOTE — PLAN OF CARE
D/c orders received. IV removed, IV catheter intact. D/c instructions given and discussed w/ pt and spouse. New meds e-prescribed to pharmacy. F/u appts discussed. Pt and spouse verbalized understanding. All questions answered.

## 2020-09-14 NOTE — PLAN OF CARE
Pt alert and oriented x4. On RA. Denies any SOB or chest pain. NSR on tele. Continent to bowel and bladder. Denies pain. Up with 1 x walker. Routine and prn rounding. Will continue to monitor.      Problem: Diabetes/Glucose Control  Goal: Glucose maintained life threatening arrhythmias  - Monitor electrolytes and administer replacement therapy as ordered  Outcome: Progressing     Problem: RESPIRATORY - ADULT  Goal: Achieves optimal ventilation and oxygenation  Description  INTERVENTIONS:  - Assess for changes ulcer development  - Assess and document skin integrity  - Monitor for areas of redness and/or skin breakdown  - Initiate interventions, skin care algorithm/standards of care as needed  Outcome: Progressing  Goal: Incision(s), wounds(s) or drain site(s) he bathing  - Educate and encourage patient/family in tolerated functional activity level and precautions during self-care     Outcome: Progressing

## 2020-09-14 NOTE — PROGRESS NOTES
[facility] Hospitalist Progress Note     [facility]      SUBJECTIVE:  Pt seen and examined. States he is doing well, asking when he can go home. No F/C, N/V, or SOB. Had a loose BM.      OBJECTIVE:  Temp:  [97.9 °F (36.6 °C)-98.4 °F (36.9 °C)] 97.9 °F (3 PORTABLE  (CPT=71045), 9/03/2020, 5:41 AM.  Ce Orozco , XR, XR CHEST AP PORTABLE  (CPT=71045), 9/01/2020, 10:15 AM.  TECHNIQUE:  PA and lateral chest radiographs were obtained.   PATIENT STATED HISTORY: (As transcribed by Technologist)  Patient offered no gisela ABDOMEN:  Limited images of the upper abdomen demonstrate cholelithiasis, small hiatal hernia, a small atrophic left kidney, and right renal low-density masses, the largest is located within the superior pole medially and measures 2.3 cm incompletely erica abnormality. OTHER:             Multiple calcifications within the subcutaneous tissues of the scalp noted may be vascular            CONCLUSION:  Chronic changes. No acute disease.    Dictated by (CST): Apollo Mann MD on 9/01/2020 at 11:13 AM     F ground-glass opacities throughout the bilateral lungs. Cardiomegaly with mild pulmonary vascular congestion.   The overall findings could be due to pulmonary edema from congestive failure, with atypical pneumonia from COVID-19 infection or other etiologies Q2H PRN    Or  morphINE sulfate (PF) 2 MG/ML injection 4 mg, 4 mg, Intravenous, Q2H PRN    Or  morphINE sulfate (PF) 2 MG/ML injection 6 mg, 6 mg, Intravenous, Q2H PRN  melatonin tab 3 mg, 3 mg, Oral, Nightly PRN  Albumin Human (ALBUMINAR) 5 % solution 250 Nightly  allopurinol (ZYLOPRIM) tab 300 mg, 300 mg, Oral, Daily  multivitamin (DIALYVITE - RENAL) tab 1 tablet, 1 tablet, Oral, Daily       Assessment/Plan:      82year old male with PMH of DM type II, ESRD on HD, HTN, HLD who presnted with dizziness, wea

## 2020-09-14 NOTE — CARDIAC REHAB
Cardiac rehab education completed with patient and wife  Discharge video shown.   Patient lives in [facility].  [place] information for [facility] cardiac rehab placed in AVS.

## 2020-09-14 NOTE — PROGRESS NOTES
No [facility]    Nephrology Progress Note    Fabián Camargo Attending:  Rigoberto Akers DO       SUBJECTIVE:     Sp HD this morning   Wanting to go home    PHYSICAL EXAM:     Vital Signs: BP (!) 162/62 (BP Location: Right arm)   Pulse 66   Temp 9 EOPERCENT 4.8 09/14/2020    BAPERCENT 0.8 09/14/2020    NE 8.25 (H) 09/14/2020    LYMABS 1.02 09/14/2020    MOABSO 0.90 09/14/2020    EOABSO 0.52 09/14/2020    BAABSO 0.09 09/14/2020     Lab Results   Component Value Date    MALBP 572.00 05/19/2017    CREU Daily PRN  bisacodyl (DULCOLAX) rectal suppository 10 mg, 10 mg, Rectal, Daily PRN  ondansetron HCl (ZOFRAN) injection 4 mg, 4 mg, Intravenous, Q6H PRN  Pantoprazole Sodium (PROTONIX) EC tab 40 mg, 40 mg, Oral, QAM AC  glucose (DEX4) oral liquid 15 g, 15 g MD  [facility]  [address]  [address] [facility], [address]

## 2020-09-15 NOTE — PAYOR COMM NOTE
--------------  DISCHARGE REVIEW    Payor: [facility] [place] #:  [id number]  Authorization Number: [id number]    Admit date: 9/1/20  Admit time:  2382  Discharge Date: 9/14/2020  5:31 PM     Admitting Physician: Ramy Milner MD  Attend Abnormal CXR  - CT chest with emphysematous changes -- infectious vs inflammatory process  - appreciate pulm recommendations -- doxycycline  - COVID testing negative  - repeat CT chest imaging as outpatient in 2-3 months     # DM type 2 with hyperglycemia CHANGED    atorvastatin 20 MG Oral Tab  Take 1 tablet (20 mg total) by mouth daily. , Normal, Disp-90 tablet, R-3Lab completion needed for further refills    TRAMADOL HCL 50 MG Oral Tab  TAKE 1 TABLET (50 MG TOTAL) BY MOUTH EVERY 6 (SIX) HOURS AS NEEDED FOR COMMENTS

## 2020-09-15 NOTE — DISCHARGE SUMMARY
General Medicine Discharge Summary     Patient ID:  Brian Bradshaw  82year old  4/12/1935    Admit date: 9/1/2020    Discharge date and time: 9/14/2020  5:31 PM     Attending Physician: Jose Daniel Madison ordered     # Anemia; denies bleeding  - chronic secondary to ESRD     # Hyponatremia  - pt with ESRD, HD MWF     # acute constipation  - pt declining bowel regimen initially. Has since received bisacodyl yesterday.  Miralax dose this AM.   - pt agreeable t R-3    Acetaminophen 650 MG/20.3ML Oral Solution  Take 650 mg by mouth 2 (two) times daily. , Historical    Calcium Carbonate Antacid 500 MG Oral Chew Tab  Chew 1 tablet by mouth 3 (three) times daily. , Historical    ALLOPURINOL 300 MG Oral Tab  TAKE 1 TABL

## 2020-09-18 ENCOUNTER — TELEPHONE (OUTPATIENT)
Dept: CARDIOLOGY UNIT | Facility: HOSPITAL | Age: 85
End: 2020-09-18

## 2020-09-18 NOTE — PROGRESS NOTES
Follow Up Phone Call    1. How are you doing now that you are home? Well    2. Have there been any changes in your wound/incision since going home? None, healing well. [facility] RN states that it looks great  3. Is your pain manageable at home? Yes, patient has no pain    4. Are you following the walking routine given to you in the hospital? Yes  Patient did not walk today because he had dialysis and just got home  5. Are you continuing to use your incentive spirometer? Is 1250  6. Do you have your appointments for Chest Xray? 9/23                                                              Primary MD? Dr Lisandro Delgado on tues                                                               Cardiologist?  9/24 Paul HENDERSON at 0930                                                              Dr. Percy Garcia? 10/8/20 Karen Maza at                                                                                                    1200                                                              Cardiac Rehab? Jazmine villalpando phase 2    7. Do you have any other questions or concerns today? Patient is coughing a lot. RN called Dr Hernesto Molina office to order cough medicine.     Julian Lr  9/18/2020  2:48 PM

## 2020-09-22 NOTE — PROCEDURES
[facility]    PATIENT'S NAME: ST Jeri BORREGO   ATTENDING PHYSICIAN: Nuris Amezquita DO   OPERATING PHYSICIAN: Naty Jean Baptiste.  Eamon Da Silva MD   PATIENT ACCOUNT#:   [id number]    LOCATION:  [address] [place] [address]  MEDICAL RECORD #:   [id number]       DATE OF BIRTH: pressure was 8. Cardiac output was 6.2. CONCLUSIONS:  An 61-year-old gentleman with severe disease. We will consult CT Surgery.   PCI could be considered, but would be very high risk in this very high-risk patient and surgical revascularization would b

## 2020-09-23 ENCOUNTER — TELEPHONE (OUTPATIENT)
Dept: CARDIOLOGY UNIT | Facility: HOSPITAL | Age: 85
End: 2020-09-23

## 2020-09-23 NOTE — PROGRESS NOTES
Pt had cxr at primary care MD today, requesting to cancel cxr scheduled for today. Cxr w/ primary MD only PA/LAT, has small/moderate pleural effusion. Pt does report some CORBIN as well as orthopnea, had HD tomorrow.   Rescheduled cardiology appt for Nina

## 2020-09-24 ENCOUNTER — HOSPITAL ENCOUNTER (OUTPATIENT)
Dept: GENERAL RADIOLOGY | Facility: HOSPITAL | Age: 85
Discharge: HOME | End: 2020-09-24
Attending: THORACIC SURGERY (CARDIOTHORACIC VASCULAR SURGERY)
Payer: MEDICARE

## 2020-09-24 DIAGNOSIS — J90 PLEURAL EFFUSION: ICD-10-CM

## 2020-09-24 PROBLEM — I25.10 CORONARY ARTERY DISEASE INVOLVING NATIVE CORONARY ARTERY OF NATIVE HEART WITHOUT ANGINA PECTORIS: Status: ACTIVE | Noted: 2020-09-24

## 2020-09-24 PROBLEM — Z95.1 S/P CABG X 3: Status: ACTIVE | Noted: 2020-09-24

## 2020-09-24 PROCEDURE — 71048 X-RAY EXAM CHEST 4+ VIEWS: CPT | Performed by: THORACIC SURGERY (CARDIOTHORACIC VASCULAR SURGERY)

## 2020-09-29 ENCOUNTER — APPOINTMENT (OUTPATIENT)
Dept: LAB | Age: 85
End: 2020-09-29
Attending: THORACIC SURGERY (CARDIOTHORACIC VASCULAR SURGERY)
Payer: MEDICARE

## 2020-09-29 DIAGNOSIS — J90 PLEURAL EFFUSION: ICD-10-CM

## 2020-09-30 RX ORDER — ACETAMINOPHEN 500 MG
650 TABLET ORAL EVERY 6 HOURS PRN
COMMUNITY

## 2020-10-01 ENCOUNTER — APPOINTMENT (OUTPATIENT)
Dept: LAB | Facility: HOSPITAL | Age: 85
End: 2020-10-01
Attending: THORACIC SURGERY (CARDIOTHORACIC VASCULAR SURGERY)
Payer: MEDICARE

## 2020-10-01 ENCOUNTER — HOSPITAL ENCOUNTER (OUTPATIENT)
Dept: GENERAL RADIOLOGY | Facility: HOSPITAL | Age: 85
Discharge: HOME | End: 2020-10-01
Attending: RADIOLOGY
Payer: MEDICARE

## 2020-10-01 ENCOUNTER — HOSPITAL ENCOUNTER (OUTPATIENT)
Dept: ULTRASOUND IMAGING | Facility: HOSPITAL | Age: 85
Discharge: HOME | End: 2020-10-01
Attending: THORACIC SURGERY (CARDIOTHORACIC VASCULAR SURGERY)
Payer: MEDICARE

## 2020-10-01 VITALS
DIASTOLIC BLOOD PRESSURE: 70 MMHG | OXYGEN SATURATION: 98 % | WEIGHT: 186 LBS | SYSTOLIC BLOOD PRESSURE: 159 MMHG | TEMPERATURE: 98 F | HEIGHT: 68 IN | HEART RATE: 72 BPM | RESPIRATION RATE: 18 BRPM | BODY MASS INDEX: 28.19 KG/M2

## 2020-10-01 DIAGNOSIS — J90 PLEURAL EFFUSION: ICD-10-CM

## 2020-10-01 DIAGNOSIS — J90 PLEURAL EFFUSION ON LEFT: ICD-10-CM

## 2020-10-01 DIAGNOSIS — J90 PLEURAL EFFUSION ON LEFT: Primary | ICD-10-CM

## 2020-10-01 PROCEDURE — 85610 PROTHROMBIN TIME: CPT

## 2020-10-01 PROCEDURE — 71045 X-RAY EXAM CHEST 1 VIEW: CPT | Performed by: RADIOLOGY

## 2020-10-01 PROCEDURE — 32555 ASPIRATE PLEURA W/ IMAGING: CPT | Performed by: THORACIC SURGERY (CARDIOTHORACIC VASCULAR SURGERY)

## 2020-10-01 PROCEDURE — 36415 COLL VENOUS BLD VENIPUNCTURE: CPT

## 2020-10-01 PROCEDURE — 82962 GLUCOSE BLOOD TEST: CPT

## 2020-10-01 NOTE — IMAGING NOTE
Report given to [facility]  ml removed and  L mid back dressing intact a  Dime  Size  Blood on the  Tegaderm.  Chest xray done  Pt transported to [address] [place] [address] by cart

## 2020-10-08 ENCOUNTER — HOSPITAL ENCOUNTER (OUTPATIENT)
Dept: GENERAL RADIOLOGY | Facility: HOSPITAL | Age: 85
Discharge: HOME | DRG: 291 | End: 2020-10-08
Attending: THORACIC SURGERY (CARDIOTHORACIC VASCULAR SURGERY)
Payer: MEDICARE

## 2020-10-08 ENCOUNTER — HOSPITAL ENCOUNTER (INPATIENT)
Facility: HOSPITAL | Age: 85
LOS: 2 days | Discharge: HOME | DRG: 291 | End: 2020-10-10
Attending: EMERGENCY MEDICINE | Admitting: INTERNAL MEDICINE
Payer: MEDICARE

## 2020-10-08 DIAGNOSIS — R07.9 CHEST PAIN OF UNCERTAIN ETIOLOGY: ICD-10-CM

## 2020-10-08 DIAGNOSIS — R77.8 ELEVATED TROPONIN: Primary | ICD-10-CM

## 2020-10-08 DIAGNOSIS — J90 PLEURAL EFFUSION: ICD-10-CM

## 2020-10-08 DIAGNOSIS — I16.0 HYPERTENSIVE URGENCY: ICD-10-CM

## 2020-10-08 DIAGNOSIS — N18.9 CHRONIC KIDNEY DISEASE, UNSPECIFIED CKD STAGE: ICD-10-CM

## 2020-10-08 DIAGNOSIS — D64.9 ANEMIA, UNSPECIFIED TYPE: ICD-10-CM

## 2020-10-08 PROBLEM — E87.1 HYPONATREMIA: Status: ACTIVE | Noted: 2020-10-08

## 2020-10-08 PROBLEM — R79.89 ELEVATED TROPONIN: Status: ACTIVE | Noted: 2020-10-08

## 2020-10-08 PROCEDURE — 86850 RBC ANTIBODY SCREEN: CPT | Performed by: EMERGENCY MEDICINE

## 2020-10-08 PROCEDURE — 93010 ELECTROCARDIOGRAM REPORT: CPT

## 2020-10-08 PROCEDURE — 86901 BLOOD TYPING SEROLOGIC RH(D): CPT | Performed by: EMERGENCY MEDICINE

## 2020-10-08 PROCEDURE — 82728 ASSAY OF FERRITIN: CPT | Performed by: INTERNAL MEDICINE

## 2020-10-08 PROCEDURE — 83880 ASSAY OF NATRIURETIC PEPTIDE: CPT | Performed by: EMERGENCY MEDICINE

## 2020-10-08 PROCEDURE — 84484 ASSAY OF TROPONIN QUANT: CPT | Performed by: EMERGENCY MEDICINE

## 2020-10-08 PROCEDURE — 86706 HEP B SURFACE ANTIBODY: CPT | Performed by: INTERNAL MEDICINE

## 2020-10-08 PROCEDURE — 80053 COMPREHEN METABOLIC PANEL: CPT | Performed by: EMERGENCY MEDICINE

## 2020-10-08 PROCEDURE — 82962 GLUCOSE BLOOD TEST: CPT

## 2020-10-08 PROCEDURE — 85025 COMPLETE CBC W/AUTO DIFF WBC: CPT | Performed by: EMERGENCY MEDICINE

## 2020-10-08 PROCEDURE — 96374 THER/PROPH/DIAG INJ IV PUSH: CPT

## 2020-10-08 PROCEDURE — 80053 COMPREHEN METABOLIC PANEL: CPT

## 2020-10-08 PROCEDURE — 99285 EMERGENCY DEPT VISIT HI MDM: CPT

## 2020-10-08 PROCEDURE — 90935 HEMODIALYSIS ONE EVALUATION: CPT | Performed by: INTERNAL MEDICINE

## 2020-10-08 PROCEDURE — 83540 ASSAY OF IRON: CPT | Performed by: INTERNAL MEDICINE

## 2020-10-08 PROCEDURE — 83550 IRON BINDING TEST: CPT | Performed by: INTERNAL MEDICINE

## 2020-10-08 PROCEDURE — 71048 X-RAY EXAM CHEST 4+ VIEWS: CPT | Performed by: THORACIC SURGERY (CARDIOTHORACIC VASCULAR SURGERY)

## 2020-10-08 PROCEDURE — 86920 COMPATIBILITY TEST SPIN: CPT

## 2020-10-08 PROCEDURE — 5A1D70Z PERFORMANCE OF URINARY FILTRATION, INTERMITTENT, LESS THAN 6 HOURS PER DAY: ICD-10-PCS | Performed by: INTERNAL MEDICINE

## 2020-10-08 PROCEDURE — 86900 BLOOD TYPING SEROLOGIC ABO: CPT | Performed by: EMERGENCY MEDICINE

## 2020-10-08 PROCEDURE — 93005 ELECTROCARDIOGRAM TRACING: CPT

## 2020-10-08 RX ORDER — ONDANSETRON 2 MG/ML
4 INJECTION INTRAMUSCULAR; INTRAVENOUS EVERY 6 HOURS PRN
Status: DISCONTINUED | OUTPATIENT
Start: 2020-10-08 | End: 2020-10-10

## 2020-10-08 RX ORDER — ACETAMINOPHEN 325 MG/1
650 TABLET ORAL EVERY 6 HOURS PRN
Status: DISCONTINUED | OUTPATIENT
Start: 2020-10-08 | End: 2020-10-10

## 2020-10-08 RX ORDER — CARVEDILOL 3.12 MG/1
3.12 TABLET ORAL 2 TIMES DAILY WITH MEALS
Status: DISCONTINUED | OUTPATIENT
Start: 2020-10-08 | End: 2020-10-09

## 2020-10-08 RX ORDER — FUROSEMIDE 10 MG/ML
40 INJECTION INTRAMUSCULAR; INTRAVENOUS ONCE
Status: DISCONTINUED | OUTPATIENT
Start: 2020-10-08 | End: 2020-10-08

## 2020-10-08 RX ORDER — AMLODIPINE BESYLATE 5 MG/1
5 TABLET ORAL DAILY
Status: DISCONTINUED | OUTPATIENT
Start: 2020-10-08 | End: 2020-10-09

## 2020-10-08 RX ORDER — FUROSEMIDE 10 MG/ML
80 INJECTION INTRAMUSCULAR; INTRAVENOUS ONCE
Status: COMPLETED | OUTPATIENT
Start: 2020-10-08 | End: 2020-10-08

## 2020-10-08 RX ORDER — HEPARIN SODIUM 5000 [USP'U]/ML
5000 INJECTION, SOLUTION INTRAVENOUS; SUBCUTANEOUS EVERY 8 HOURS SCHEDULED
Status: DISCONTINUED | OUTPATIENT
Start: 2020-10-08 | End: 2020-10-10

## 2020-10-08 NOTE — PROGRESS NOTES
Assumed care of patient at ~1745. Alert and oriented. Sinus rhythm on telemetry. SBP 150s -170s. Dyspneic on exertion. SPO2 mid 90s on room air. Left AV fistula. Positive bruit and thrill. STAT HD. Order called to [employer].   Patient refused [facility] [place]

## 2020-10-08 NOTE — CONSULTS
[facility]    Report of Consultation    [address] Patient Status:  Emergency    1935 MRN [id number]   Location [facility] Attending Andria Morse MD   Hosp Day # 0 PCP Jimmy Medina MD       REASON FOR CONSULT: • CAUDAL N/A 4/17/2015    Performed by Nicole Dawson MD at [facility] ESD - INTERNAL  08/27/2019    [facility] Dr. Karolina Kerr   • COLONOSCOPY      30 yrs ago    • ESOPHAGOGASTRODUODENOSCOPY, POSSIBLE BIOPSY, POSSIBLE POLYPECTOMY Place 1 patch onto the skin daily. •  aspirin  MG Oral Tab EC, Take 1 tablet (325 mg total) by mouth daily. •  carvedilol 3.125 MG Oral Tab, Take 1 tablet (3.125 mg total) by mouth 2 (two) times daily with meals.     •  Pantoprazole Sodium 40 MG 10/08/2020    GLOBULIN 3.9 10/08/2020    AGRATIO 1.5 08/20/2014     (L) 10/08/2020    K 4.9 10/08/2020    CL 98 10/08/2020    CO2 32.0 10/08/2020     Lab Results   Component Value Date    WBC 6.4 10/08/2020    RBC 2.35 (L) 10/08/2020    HGB 7.6 (L) 1 questions.     Sarah Coy MD  [facility]  [facility] 93  [facility]  [facility], [address]    10/8/2020  4:45 PM

## 2020-10-08 NOTE — ED PROVIDER NOTES
Patient Seen in: [facility] Emergency Department      History   Patient presents with:  Difficulty Breathing    Stated Complaint: SOB, cardiac history    HPI    This an 42-year-old man from home, history of CABG 1 month ago with Dr. Anastasiya Barroso, ESRD on srikanth MANAGEMENT   • COLON AND ESD - INTERNAL  08/27/2019    [facility] Dr. Renetta Martinez   • COLONOSCOPY      30 yrs ago    • ESOPHAGOGASTRODUODENOSCOPY, POSSIBLE BIOPSY, POSSIBLE POLYPECTOMY [address]707 N/A 10/8/2012    Performed by Julian Dash MD at [address] [place] [address] PANEL (14) - Abnormal; Notable for the following components:       Result Value    Sodium 132 (*)     BUN 32 (*)     Creatinine 4.58 (*)     BUN/CREA Ratio 7.0 (*)     GFR, Non- 11 (*)     GFR, -American 13 (*)     AST 13 (*)     Alb TYPE AND SCREEN    Narrative: The following orders were created for panel order TYPE AND SCREEN.   Procedure                               Abnormality         Status                     ---------                               -----------         ----- vasculature is mildly congested with mild cephalization of vessels and minimal interstitial edema. Minimal blunting of the left costophrenic angle is either due to small left pleural effusion or developing pleural thickening.  Lungs are clear without infilt dark blood tinged fluid was withdrawn. No request for laboratory testing. The system was then removed. Sterile dressings were placed. He tolerated the procedure. No immediate complication.              CONCLUSION:  Ultrasound-guided therapeutic left th ---------  LV E/e', average                        19             19         ---------   Ventricular septum                      Value          08/27/2020 Reference  IVS thickness, ED, PLAX         (H)     1.16  cm       1.2        0.6 - 1   LVOT 08/27/2020 Reference  Aortic root ID, ED                      3.3   cm       3.3        <4.1  Ascending aorta ID, A-P, ED             3.0   cm       3.5        ---------   Left atrium                             Value          08/27/2020 Reference  LA ID, normal in size. Aortic valve:   Trileaflet; mildly calcified leaflets. Doppler: There is mild stenosis. There is mild regurgitation. Mitral valve:   Mildly calcified annulus. Doppler:  Transvalvular velocity is within the normal range.  There is no ev CHEST AP PORTABLE  (CPT=71045)  TECHNIQUE:  AP chest radiograph was obtained.   COMPARISON:  [facility] , XR, XR CHEST DECUBITUS BILAT INCL PA AND LAT(4 VIEWS)(CPT=71048), 9/24/2020, 1:47 PM.  INDICATIONS:   PATIENT STATED HISTORY: (As transcribed by [employer] CHEST AP PORTABLE  (CPT=71045), 9/09/2020, 4:46 AM.  INDICATIONS:  Removed Chest tubes  PATIENT STATED HISTORY: (As transcribed by Technologist)  Patient offered no additional history at this time.     FINDINGS:  Removal of right Madison-Lala catheter, mediast Views)(foe=[id number])    Result Date: 10/8/2020  PROCEDURE:  XR CHEST DECUBITUS BILAT INCL PA AND LAT(4 VIEWS)(CPT=71048)  TECHNIQUE:  2 views chest radiograph and bilateral chest decubitus views were obtained.   COMPARISON:  [facility] , XR, XR CHEST AP PORTABLE and small to moderate free-flowing left pleural effusion. Degenerative changes of thoracic spine.             CONCLUSION:  Small to moderate free-flowing left pleural effusion and small free flowing right pleural effusion    Dictated by (CST): Crist Seip and close monitoring.       Admission disposition: 10/8/2020  3:07 PM                   Disposition and Plan     Clinical Impression:  Elevated troponin  (primary encounter diagnosis)  Hypertensive urgency  Chronic kidney disease, unspecified CKD stage  Amina

## 2020-10-08 NOTE — H&P
[facility] Hospitalist History and Physical      Patient presents with:  Difficulty Breathing       PCP: Melania Cortez MD      History of Present Illness: Patient is a 82year old male with PMH sig for CAD s/p CABG (9/8/2020), recurrent L pleural effusion, DM type MD at [address] N/A 4/17/2015    Performed by Alla Beltran MD at [facility]   • COLON AND ESD - INTERNAL  08/27/2019    [facility] Dr. Zenon Burns   • COLONOSCOPY      30 yrs ago    • ESOPHAGOGASTRODUODENOSCOPY, TABLET (50 MG TOTAL) BY MOUTH EVERY 6 (SIX) HOURS AS NEEDED FOR PAIN. •  Calcium Carbonate Antacid 500 MG Oral Chew Tab, Chew 1 tablet by mouth 3 (three) times daily.     •  ALLOPURINOL 300 MG Oral Tab, TAKE 1 TABLET BY MOUTH EVERY DAY    •  DIALYVITE/ZI 10/08/2020    TP 6.6 10/08/2020    AST 13 10/08/2020    ALT 16 10/08/2020    TROP 0.047 10/08/2020       CXR: image personally reviewed.       Radiology: Xr Chest Pa + Lat Chest (cpt=71046)    Result Date: 9/22/2020  DATE OF SERVICE: 09.22.2020 XR CHEST PA thoracentesis. The procedure was discussed in detail with the patient and his wife. Review of benefits, alternatives, and risks. Discussion of risks included, but was not limited to infection, bleeding, organ/nerve injury. All questions were answered. echocardiography was performed. Image quality was adequate. M-mode, complete 2D, complete spectral Doppler, and color Doppler.  ---------------------------------------------------------------------------- Measurements  Left ventricle 32    m/sec    1.62       ---------  Aortic valve VTI, S                     62.1  cm       56.8       ---------  Aortic mean gradient, S                 16    mm Hg    12         ---------  Aortic peak gradient, S                 31    mm Hg    23 3     mm Hg    3          ---------   Right ventricle                         Value          08/27/2020 Reference  RV pressure, S, DP                      46    mm Hg    25         --------- Legend: (L)  and  (H)  lonny values outside specified referen The estimated ejection fraction is 55-60%. Wall motion is normal; there are no regional wall motion abnormalities. 2. Aortic valve: There is mild stenosis. There is mild regurgitation. Peak    velocity (S): 2.8m/sec. Mean gradient (S): 16mm Hg.  Peak gra PATIENT STATED HISTORY: (As transcribed by Technologist)  No additional information. FINDINGS:  Cardiomediastinal silhouette stable with cardiomegaly, sternotomy wires, mediastinal clips, and atherosclerotic aortic arch.   Pulmonary vasculature redistrib Technologist)  Patient offered no additional history at this time. CONCLUSION:  The ET tube is at the level the juana near the origin of the right mainstem bronchus and should be withdrawn 2.5 cm.  The right [place]-Lala catheter, mediastinal dr (CST):  Efrem Li MD on 10/08/2020 at 11:57 AM       Xr Chest Decubitus Bilat Incl Pa And Lat(4 Views)(cpt=71048)    Result Date: 9/24/2020  PROCEDURE:  XR CHEST DECUBITUS BILAT INCL PA AND LAT(4 VIEWS)(CPT=71048)  TECHNIQUE:  2 views chest radiograph and appreciate card eval    # Abnormal CXR  - CT chest with emphysematous changes -- infectious vs inflammatory process  - appreciate pulm recommendations -- doxycycline  - COVID testing negative  - repeat CT chest imaging as outpatient in 2-3 months     # Ple

## 2020-10-08 NOTE — CONSULTS
[facility] Cardiology Consultation Kerri Calvert MD    The patient was interviewed, examined, the chart was reviewed and the consult was dictated. This is a 82year old male with a chief complaint of chest pain, SOB.     Impression:  1.chest pain - atypical wit

## 2020-10-08 NOTE — ED NOTES
Pt reevaluated by er physician. Pt informed of all his test reports and plan of care. Hesitant to be admitted.

## 2020-10-08 NOTE — ED INITIAL ASSESSMENT (HPI)
Pt presents from xray states he awoke at 3 am with steven and cp that resolved called his pmd who told him to come in for xray and see his cardiologist denies cp or steven

## 2020-10-09 ENCOUNTER — APPOINTMENT (OUTPATIENT)
Dept: CV DIAGNOSTICS | Facility: HOSPITAL | Age: 85
DRG: 291 | End: 2020-10-09
Attending: INTERNAL MEDICINE
Payer: MEDICARE

## 2020-10-09 PROCEDURE — 30233N1 TRANSFUSION OF NONAUTOLOGOUS RED BLOOD CELLS INTO PERIPHERAL VEIN, PERCUTANEOUS APPROACH: ICD-10-PCS | Performed by: INTERNAL MEDICINE

## 2020-10-09 PROCEDURE — 36430 TRANSFUSION BLD/BLD COMPNT: CPT

## 2020-10-09 PROCEDURE — 93306 TTE W/DOPPLER COMPLETE: CPT | Performed by: INTERNAL MEDICINE

## 2020-10-09 PROCEDURE — 80069 RENAL FUNCTION PANEL: CPT | Performed by: INTERNAL MEDICINE

## 2020-10-09 PROCEDURE — 97161 PT EVAL LOW COMPLEX 20 MIN: CPT

## 2020-10-09 PROCEDURE — 87340 HEPATITIS B SURFACE AG IA: CPT | Performed by: INTERNAL MEDICINE

## 2020-10-09 PROCEDURE — 82962 GLUCOSE BLOOD TEST: CPT

## 2020-10-09 PROCEDURE — 90935 HEMODIALYSIS ONE EVALUATION: CPT | Performed by: INTERNAL MEDICINE

## 2020-10-09 PROCEDURE — 85025 COMPLETE CBC W/AUTO DIFF WBC: CPT | Performed by: INTERNAL MEDICINE

## 2020-10-09 RX ORDER — AMLODIPINE BESYLATE 5 MG/1
5 TABLET ORAL 2 TIMES DAILY
Status: DISCONTINUED | OUTPATIENT
Start: 2020-10-09 | End: 2020-10-10

## 2020-10-09 RX ORDER — CARVEDILOL 6.25 MG/1
6.25 TABLET ORAL 2 TIMES DAILY WITH MEALS
Status: DISCONTINUED | OUTPATIENT
Start: 2020-10-09 | End: 2020-10-10

## 2020-10-09 RX ORDER — SODIUM CHLORIDE 9 MG/ML
INJECTION, SOLUTION INTRAVENOUS ONCE
Status: DISCONTINUED | OUTPATIENT
Start: 2020-10-09 | End: 2020-10-10

## 2020-10-09 RX ORDER — ATORVASTATIN CALCIUM 20 MG/1
20 TABLET, FILM COATED ORAL NIGHTLY
Status: DISCONTINUED | OUTPATIENT
Start: 2020-10-09 | End: 2020-10-10

## 2020-10-09 RX ORDER — ASPIRIN 81 MG/1
81 TABLET ORAL DAILY
Status: DISCONTINUED | OUTPATIENT
Start: 2020-10-09 | End: 2020-10-10

## 2020-10-09 NOTE — PROGRESS NOTES
[facility]    Nephrology Progress Note    Robyn Roach Attending:  Gaby Olivarez,        SUBJECTIVE:     Feels a little better today but still seems a little short of breath while talking  Tolerated UF 2 L yesterday without cramping    PHY NEPERCENT 68.6 10/09/2020    LYPERCENT 14.8 10/09/2020    MOPERCENT 9.8 10/09/2020    EOPERCENT 5.7 10/09/2020    BAPERCENT 0.8 10/09/2020    NE 4.95 10/09/2020    LYMABS 1.07 10/09/2020    MOABSO 0.71 10/09/2020    EOABSO 0.41 10/09/2020    BAABSO 0.06 10 Nephrology  [address]  [address] [place] [address]  [facility], [address]    10/9/2020  1:37 PM

## 2020-10-09 NOTE — PLAN OF CARE
Neuro: A&Ox4, pt has glasses, bilateral hearing aids, and upper/lower dentures at the bedside, pt ambulates with standby assist and a walker, pt is at high risk for falls d/t hx of fall at home within last 6 months  Resp: room air, regular rate, crackles h

## 2020-10-09 NOTE — PLAN OF CARE
Rcv'd A/O/4  Denies pain or discomfort  On tele with SR   Lung sounds clear bilateral  HD tonight lt AV fistula access  Fall risk  Bed alarm  Problem: Diabetes/Glucose Control  Goal: Glucose maintained within prescribed range  Description: INTERVENTIONS:

## 2020-10-09 NOTE — PHYSICAL THERAPY NOTE
PHYSICAL THERAPY QUICK EVALUATION - INPATIENT    Room Number: 6201/8968-Z  Evaluation Date: 10/9/2020  Presenting Problem: elevated troponin  Physician Order: PT Eval and Treat    Pt is 82year old male admitted on 10/8/2020 from home with c/o chest pain [address] [place] [address]   • HEART CORONARY ARTERY BYPASS GRAFT N/A 9/8/2020    Performed by Lolly Lacey MD at [place] [address] 4/17/2015    Performed by Ann Marie Lozada MD at [address] [place] [address]   • OTHER      AV fistula crea wheelchair)?: A Little   -   Need to walk in hospital room?: A Little   -   Climbing 3-5 steps with a railing?: A Little       AM-PAC Score:  Raw Score: 20   Approx Degree of Impairment: 35.83%   Standardized Score (AM-PAC Scale): 47.67   CMS Modifier (G-C maintain current level of mobility. The rehab aide will perform treatment activities prescribed by this physical therapist. The rehab aide will communicate with overseeing PT regarding any change in functional mobility. RN aware.      GOALS  Patient was a

## 2020-10-09 NOTE — CM/SW NOTE
Noted that pt is current with [facility] for RN/PT services. Resumption order entered for services. PT is recommending home at discharge. Discharge summary updated. [facility] liaison made aware of the resumption order entered.     [facility]  P:[id number]

## 2020-10-09 NOTE — CONSULTS
[facility] Patient Status:  Inpatient    1935 MRN [id number]   [facility] 8NE-A Attending Gaudencio Braxton, DO   Hosp Day # 1 PCP Rola Mejia MD     Date of Admission: 10/8/2020 12:05 PM  Admission Diagnosi • COLONOSCOPY      30 yrs ago    • ESOPHAGOGASTRODUODENOSCOPY, POSSIBLE BIOPSY, POSSIBLE POLYPECTOMY [address] N/A 10/8/2012    Performed by Rashid Barba MD at [address] [place] [address] N/A 9/8/2020    Performed by Ariana Caballero , Disp: , Rfl: 6         Current Medications:    Current Facility-Administered Medications:   •  carvedilol (COREG) tab 6.25 mg, 6.25 mg, Oral, BID with meals  •  amLODIPine Besylate (NORVASC) tab 5 mg, 5 mg, Oral, BID  •  aspirin EC tab 81 mg, 81 mg, Ora — — — — 181 lb 14.1 oz (82.5 kg)   10/09/20 0742 (!) 194/70 98.5 °F (36.9 °C) Oral 83 16 92 % —   10/09/20 0500 — — — 83 — (!) 89 % —   10/09/20 0407 157/55 98.7 °F (37.1 °C) Oral 77 18 97 % —   10/09/20 0300 — — — 81 — 94 % —   10/09/20 0100 156/30 97.9 ° 18.3 10/09/2020    .0 10/09/2020     Lab Results   Component Value Date     10/09/2020    K 4.8 10/09/2020     10/09/2020    CO2 25.0 10/09/2020    BUN 39 10/09/2020    CREATSERUM 5.24 10/09/2020    GLU 86 10/09/2020    CA 9.1 10/09/2020

## 2020-10-09 NOTE — CONSULTS
[facility]    PATIENT'S NAME: Giovani Westonnate BORREGO   ATTENDING PHYSICIAN: Juan Cheung DO   CONSULTING PHYSICIAN: Tonya Ponce M.D.    PATIENT ACCOUNT#:   [de-identified]    LOCATION:  [address] [place] [address]  MEDICAL RECORD #:   [id number]       DATE OF BIRTH mildly tachypneic. VITAL SIGNS:  Blood pressure, initially 215 and 185, pulse rate is 80 and regular, he is afebrile. HEENT:  Head normocephalic. Anicteric sclerae. NECK:  Supple. LUNGS:  Rales bilaterally, dullness in both bases.   HEART:  No jugula

## 2020-10-09 NOTE — PAYOR COMM NOTE
--------------  Appropriate for inpatient status per guidelines for SOB and chest pain due to volume overload and pulmonary edema with HTN urgency.          ADMISSION REVIEW     Payor: [address] [place] [address] #:  [id number]  Authorization Numb CVOR   • A.V. FISTULA Left 11/21/2016    Performed by Yulia Bryant MD at [address] [place] [address] And [address] [facility] Bilateral     IOL   • CAUDAL N/A 5/5/2015    Performed by Arvind Kimble MD at [address] [place] [address] Ave N/A 4/17/2015    Performed by Barkley Brunner No wheezing, rhonchi or rales.    Abdominal: Soft nontender nondistended, normal bowel sounds, no guarding no rebound tenderness  Skin: Warm and dry  Neurological: Awake alert, speech is normal        ED Course     Labs Reviewed   COMP METABOLIC PANEL (14) ------                     CBC W/ DIFFERENTIAL[[id number]]          Abnormal            Final result                 Please view results for these tests on the individual orders.    RENAL FUNCTION PANEL   CBC WITH DIFFERENTIAL WITH PLATELET   TYPE AND PA and left lateral views of the chest, 2 views. FINDINGS:There is borderline cardiomegaly with aortic elongation and calcification in the arch and descending thoracic aorta. Mediastinal clips and sternotomy wires are identified.  Pulmonary vasculature is m clinical symptoms persist then consider follow-up or CT.     Dictated by (CST): Eliot Simmons MD on 10/08/2020 at 11:56 AM     Finalized by (CST): Eliot Simmons MD on 10/08/2020 at 11:57 AM         MDM     This is an 61-year-old man history of recent CABG, ESRD o cough. No sick contacts. This AM he also developed L sided chest pain which was new for him. In the ED, he was noted to be volume overloaded with BNP 65K, trop of 0.047. BP elevated to 203/85, pt given coreg, amlodipine, and 80 mg IV lasix. overload  - cont IV diuresis per cardiology recs  - HD per renal recs    # Hypertensive urgency  - resume home medications including coreg  - amlodipine added  - cards c/s, further titration of BP meds per their recs  - HD per renal     # Chest pain with e OM  3.  HTN                -Some orthostatic change, but not hypotensive  4.  HL  5.  DM  6.  Mild AS  7.  ESRD                -RQ HD  8.  RBBB  9.  Anemia  10.  Abn CT               -GGO appearance noted on recent imaging  11.   L pleural effusion echo     # Abnormal CXR  - CT chest with emphysematous changes -- infectious vs inflammatory process  - COVID testing negative  - pulmonary consulted, eval for pt CORBIN as other causes appears to be stable     # Pleural effusions  - s/p L thora on 9/28  - sm

## 2020-10-09 NOTE — PROGRESS NOTES
[facility]  Progress Note    Davidson Sandra Patient Status:  Inpatient    1935 MRN [id number]   [facility] 8NE-A Attending Rylan To, DO   Hosp Day # 1 PCP Shira Yang MD     Subjective:   Continues to regular rate and rhythm. There is normal S1, S2. There is no S3 or S4. Grade 1/6 ALICIA LSB. There are no rubs or gallops. No click is appreciated. PMI is nondisplaced with a normal apical impulse.     Pulmonary:  Lungs are clear to auscultation bilatera ASA  5. Statin  6. Mild permissive HTN given orthostatic changes. 7.  Consider further transfusion with HD  8. Volume optimization with HD  9.   Consider pulmonary evaluation given abn imaging in the past and lack of substantial improvement following HD

## 2020-10-09 NOTE — PROGRESS NOTES
[facility]    Progress Note     Bonifacio Golden Patient Status:  Inpatient    1935 MRN [id number]   [facility] 8NE-A Attending Link Ng DO   Hosp Day # 1 PCP Errol Cushing, MD     Chief Complaint: SOB and Chest pain type II, ESRD on HD, HTN, and HLD who presented to the ED c/o worsening CORBIN and chest pain.      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

## 2020-10-10 VITALS
DIASTOLIC BLOOD PRESSURE: 64 MMHG | OXYGEN SATURATION: 95 % | RESPIRATION RATE: 18 BRPM | BODY MASS INDEX: 28 KG/M2 | SYSTOLIC BLOOD PRESSURE: 175 MMHG | WEIGHT: 181.88 LBS | TEMPERATURE: 98 F | HEART RATE: 74 BPM

## 2020-10-10 PROCEDURE — 82962 GLUCOSE BLOOD TEST: CPT

## 2020-10-10 PROCEDURE — 85025 COMPLETE CBC W/AUTO DIFF WBC: CPT | Performed by: INTERNAL MEDICINE

## 2020-10-10 PROCEDURE — 80069 RENAL FUNCTION PANEL: CPT | Performed by: INTERNAL MEDICINE

## 2020-10-10 RX ORDER — CARVEDILOL 12.5 MG/1
12.5 TABLET ORAL 2 TIMES DAILY WITH MEALS
Qty: 60 TABLET | Refills: 0 | Status: SHIPPED | OUTPATIENT
Start: 2020-10-10 | End: 2020-10-20

## 2020-10-10 RX ORDER — CARVEDILOL 12.5 MG/1
12.5 TABLET ORAL 2 TIMES DAILY WITH MEALS
Status: DISCONTINUED | OUTPATIENT
Start: 2020-10-10 | End: 2020-10-10

## 2020-10-10 RX ORDER — ASPIRIN 81 MG/1
81 TABLET ORAL DAILY
Qty: 30 TABLET | Refills: 0 | Status: SHIPPED | OUTPATIENT
Start: 2020-10-11 | End: 2020-10-20

## 2020-10-10 RX ORDER — AMLODIPINE BESYLATE 5 MG/1
5 TABLET ORAL 2 TIMES DAILY
Qty: 60 TABLET | Refills: 0 | Status: SHIPPED | OUTPATIENT
Start: 2020-10-10 | End: 2020-10-20

## 2020-10-10 NOTE — PLAN OF CARE
Pt. Is alert and oriented times four. Lungs with crackles in bases. Pt. Is sinus rhythm with BBB on monitor. Blood pressure elevated: pt. States that M.D. took him off all of his medicines and is adding them back gradually. Dr. Junior Greer increased coreg.

## 2020-10-10 NOTE — PROGRESS NOTES
[facility]  Progress Note    Subjective:   Continues to report dyspnea. SaO2 94% on RA. Reports orthopnea. No chest pain of anginal form. No new focal cardiovascular complaints reported.  S/p HD and Tx this early AM     Objective: demonstrates a regular rate and rhythm. There is normal S1, S2. There is no S3 or S4. Grade 1/6 ALICIA LSB. There are no rubs or gallops. No click is appreciated. PMI is nondisplaced with a normal apical impulse.     Pulmonary:  Lungs are clear to auscul 1.9cm^2. 4. Pulmonary arteries: Systolic pressure was moderately increased, estimated     to be 49mm Hg. Estimated pulmonary artery diastolic pressure was 01ZX Hg.       Assessment:    1.  Multivessel CAD inclusive of 95% L Main  2.  S/P CABG X 3V 9/8/20 w

## 2020-10-10 NOTE — DISCHARGE SUMMARY
General Medicine Discharge Summary     Patient ID:  Karen Arana  82year old  4/12/1935    Admit date: 10/8/2020    Discharge date and time: 10/10/2020  2:08 PM     Attending Physician: Eryn montoya.  pro for discharge. Patient agreeable with discharge. Patient to follow up with cardiology and pulmonary for further evaluation, treatment and adjustments as an outpatient.      Consults: IP CONSULT TO NEPHROLOGY  IP CONSULT TO PHYSICAL THERAPY  IP CONSULT TO SO Oral Chew Tab  Chew 1 tablet by mouth 3 (three) times daily. , Historical    ALLOPURINOL 300 MG Oral Tab  TAKE 1 TABLET BY MOUTH EVERY DAY, Normal, Disp-30 tablet, R-11    DIALYVITE/ZINC Oral Tab  TAKE ONE TABLET BY MOUTH EVERY DAY, Normal, Disp-100 tablet, but still elevated  - resume home medications including coreg --> dose increased  - amlodipine dose increased to bid   - cards following, further titration of BP meds per their recs--> outpt evaluation and adjustment as needed  - HD per renal      # Chest

## 2020-10-10 NOTE — PLAN OF CARE
Received patient awake and oriented. Dialysis in progress, 2 L taken off. On room air, breath sounds with crackles in the bases and dyspneic on exertion. On tele, SR with BBB. Patient oliguric but did urinate a small amount after dialysis.  Patient received

## 2020-10-10 NOTE — PROGRESS NOTES
[facility]    Progress Note     Cindi Moe Patient Status:  Inpatient    1935 MRN [id number]   [facility] 8NE-A Attending Lis Kelsey DO   Hosp Day # 2 PCP Santi Sahu MD     Chief Complaint: SOB and Chest pain Sodium (Porcine)  5,000 Units Subcutaneous [facility]       ASSESSMENT / PLAN:   82year old male with PMH sig for CAD s/p CABG (9/8/2020), recurrent L pleural effusion, DM type II, ESRD on HD, HTN, and HLD who presented to the ED c/o worsening CORBIN and chest 8, 1 unit prbc 10/9     # Hyponatremia--> stable  - mild suspect hypervolemic   - pt with ESRD, HD MWF, cont per renal   - trend     DVT prophy - hep SC  Dispo: inpt care. Plan of care d/w pt. Dispo pending renal, cards and pulm.  Likely if BP control impr

## 2020-10-10 NOTE — PROGRESS NOTES
[facility]    Nephrology Progress Note    Sheridan Revel Attending:  Alvarez Marie DO       SUBJECTIVE:     Tolerated HD overnight with 2L UF and 1 u pRBC transfused  LUE AVF had some oozing but not bleeding now  Mild shortness of breath note MCHC 31.5 10/10/2020    RDW 19.2 (H) 10/10/2020    NEPRELIM 5.62 10/10/2020    NEPERCENT 70.5 10/10/2020    LYPERCENT 14.9 10/10/2020    MOPERCENT 9.2 10/10/2020    EOPERCENT 4.4 10/10/2020    BAPERCENT 0.6 10/10/2020    NE 5.62 10/10/2020    LYMABS 1.19 1 to discharge from nephrology standpoint when cleared by other services.             Thank you for allowing me to participate in the care of this patient. Please do not hesitate to call with questions or concerns.         Vinod Patton MD  [address]

## 2020-10-10 NOTE — PROGRESS NOTES
Pulmonary Progress Note        NAME: Elva Mohamud Street: [id number] - MRN: [id number] - Age: 82year old - : 1935        Last 24hrs: No events overnight, ready to go home    OBJECTIVE:   10/10/20  0015 10/10/20  0434 10/10/20  0729 10/10/20 serologic inflammatory workup +/- bronchoscopy  4.  Dispo:  -stable to d/c  -can f/u w/ Dr. Hinds Points in 1-2 weeks    [facility] Pulmonary and Critical Care

## 2020-10-10 NOTE — PROGRESS NOTES
[facility]  Progress Note    Subjective:   Continues to report dyspnea. SaO2 94% on RA. Reports orthopnea. No chest pain of anginal form. No new focal cardiovascular complaints reported.     Objective:   10/09/20  2300 10/10/20  001 a regular rate and rhythm. There is normal S1, S2. There is no S3 or S4. Grade 1/6 ALICIA LSB. There are no rubs or gallops. No click is appreciated. PMI is nondisplaced with a normal apical impulse.     Pulmonary:  Lungs are clear to auscultation bilate (Vmax):     1.9cm^2. 4. Pulmonary arteries: Systolic pressure was moderately increased, estimated     to be 49mm Hg. Estimated pulmonary artery diastolic pressure was 78ZC Hg.       Assessment:    1.  Multivessel CAD inclusive of 95% L Main  2.  S/P CABG X

## 2020-10-10 NOTE — PLAN OF CARE
NURSING DISCHARGE NOTE    Discharged Home via Wheelchair. Accompanied by Support staff  Belongings Taken by patient/family. Reviewed discharge instructions with patient and his wife.  Norvasc 5 mg has been added for BID and also, coreg dosage increased

## 2020-10-12 NOTE — PAYOR COMM NOTE
--------------  DISCHARGE REVIEW    Payor: [facility] [place] #:  [id number]  Authorization Number: [id number]    Admit date: 10/8/20  Admit time:  3241  Discharge Date: 10/10/2020  2:08 PM     Admitting Physician: Cristina Baron recommended to evaluate for other pathology. Patient also did not have any further chest pain while hospitalized. Patient had an echo that showed no acute pathology. Patient elevated troponin was likely secondary to ESRD.  Patient also had hypertensive urge total) by mouth every morning before breakfast., Normal, Disp-30 tablet, R-0    atorvastatin 20 MG Oral Tab  Take 1 tablet (20 mg total) by mouth daily. , Normal, Disp-90 tablet, R-3Lab completion needed for further refills    TRAMADOL HCL 50 MG Oral Tab  T resume po torsemide   -pt with no edema on exam and pulmonary symptoms do not clinically appears to be secondary to fluid overload at this time  -pulm eval to evaluate other pathology --> outpt repeat imaging and PFT and possible inflammatory eval and bron

## 2020-11-17 ENCOUNTER — HOSPITAL ENCOUNTER (OUTPATIENT)
Dept: CT IMAGING | Facility: HOSPITAL | Age: 85
Discharge: HOME | End: 2020-11-17
Attending: INTERNAL MEDICINE
Payer: MEDICARE

## 2020-11-17 DIAGNOSIS — J43.9 PULMONARY EMPHYSEMA, UNSPECIFIED EMPHYSEMA TYPE (HCC): ICD-10-CM

## 2020-11-17 DIAGNOSIS — R06.00 DYSPNEA ON EXERTION: ICD-10-CM

## 2020-11-17 DIAGNOSIS — R91.8 PULMONARY NODULES: ICD-10-CM

## 2020-11-17 PROCEDURE — 71250 CT THORAX DX C-: CPT | Performed by: INTERNAL MEDICINE

## 2021-03-23 ENCOUNTER — ORDER TRANSCRIPTION (OUTPATIENT)
Dept: ADMINISTRATIVE | Facility: HOSPITAL | Age: 86
End: 2021-03-23

## 2021-03-23 DIAGNOSIS — Z11.59 SPECIAL SCREENING EXAMINATION FOR VIRAL DISEASE: Primary | ICD-10-CM

## 2021-04-24 ENCOUNTER — LAB ENCOUNTER (OUTPATIENT)
Dept: LAB | Facility: HOSPITAL | Age: 86
End: 2021-04-24
Attending: INTERNAL MEDICINE
Payer: MEDICARE

## 2021-04-24 DIAGNOSIS — Z11.59 SPECIAL SCREENING EXAMINATION FOR VIRAL DISEASE: ICD-10-CM

## 2021-04-27 ENCOUNTER — HOSPITAL ENCOUNTER (OUTPATIENT)
Dept: ULTRASOUND IMAGING | Facility: HOSPITAL | Age: 86
Discharge: HOME | End: 2021-04-27
Attending: INTERNAL MEDICINE
Payer: MEDICARE

## 2021-04-27 ENCOUNTER — HOSPITAL ENCOUNTER (OUTPATIENT)
Dept: GENERAL RADIOLOGY | Facility: HOSPITAL | Age: 86
Discharge: HOME | End: 2021-04-27
Attending: INTERNAL MEDICINE
Payer: MEDICARE

## 2021-04-27 DIAGNOSIS — J90 PLEURAL EFFUSION: ICD-10-CM

## 2021-04-27 PROCEDURE — 84157 ASSAY OF PROTEIN OTHER: CPT | Performed by: INTERNAL MEDICINE

## 2021-04-27 PROCEDURE — 83615 LACTATE (LD) (LDH) ENZYME: CPT | Performed by: INTERNAL MEDICINE

## 2021-04-27 PROCEDURE — 89050 BODY FLUID CELL COUNT: CPT | Performed by: INTERNAL MEDICINE

## 2021-04-27 PROCEDURE — 88108 CYTOPATH CONCENTRATE TECH: CPT | Performed by: INTERNAL MEDICINE

## 2021-04-27 PROCEDURE — 87205 SMEAR GRAM STAIN: CPT | Performed by: INTERNAL MEDICINE

## 2021-04-27 PROCEDURE — 88305 TISSUE EXAM BY PATHOLOGIST: CPT | Performed by: INTERNAL MEDICINE

## 2021-04-27 PROCEDURE — 32555 ASPIRATE PLEURA W/ IMAGING: CPT | Performed by: INTERNAL MEDICINE

## 2021-04-27 PROCEDURE — 89051 BODY FLUID CELL COUNT: CPT | Performed by: INTERNAL MEDICINE

## 2021-04-27 PROCEDURE — 87070 CULTURE OTHR SPECIMN AEROBIC: CPT | Performed by: INTERNAL MEDICINE

## 2021-04-27 PROCEDURE — 71045 X-RAY EXAM CHEST 1 VIEW: CPT | Performed by: INTERNAL MEDICINE

## 2021-04-27 PROCEDURE — 82945 GLUCOSE OTHER FLUID: CPT | Performed by: INTERNAL MEDICINE

## 2021-04-27 NOTE — PROCEDURES
Thoracentesis Procedure Note  : Dr. John King  Indication: right pleural effusion  Location: right  Pre-procedure: Patient was identified in [address] [place] [address]. Consent was obtained and documented from the patient. Procedural pause performed.   Ultrasound was

## 2021-05-29 ENCOUNTER — LAB ENCOUNTER (OUTPATIENT)
Dept: LAB | Facility: HOSPITAL | Age: 86
End: 2021-05-29
Attending: INTERNAL MEDICINE
Payer: MEDICARE

## 2021-05-29 DIAGNOSIS — Z01.818 PRE-OPERATIVE EXAM: ICD-10-CM

## 2021-05-29 DIAGNOSIS — Z11.59 ENCOUNTER FOR SCREENING FOR OTHER VIRAL DISEASES: ICD-10-CM

## 2021-06-01 ENCOUNTER — HOSPITAL ENCOUNTER (OUTPATIENT)
Dept: GENERAL RADIOLOGY | Facility: HOSPITAL | Age: 86
Discharge: HOME | End: 2021-06-01
Attending: INTERNAL MEDICINE
Payer: MEDICARE

## 2021-06-01 ENCOUNTER — HOSPITAL ENCOUNTER (OUTPATIENT)
Dept: ULTRASOUND IMAGING | Facility: HOSPITAL | Age: 86
Discharge: HOME | End: 2021-06-01
Attending: INTERNAL MEDICINE
Payer: MEDICARE

## 2021-06-01 DIAGNOSIS — J90 PLEURAL EFFUSION: ICD-10-CM

## 2021-06-01 PROCEDURE — 71045 X-RAY EXAM CHEST 1 VIEW: CPT | Performed by: INTERNAL MEDICINE

## 2021-06-01 PROCEDURE — 32555 ASPIRATE PLEURA W/ IMAGING: CPT | Performed by: INTERNAL MEDICINE

## 2021-06-01 NOTE — PROCEDURES
Thoracentesis Procedure Note  : Dr. Rylan Mercado  Indication: recurrent left pleural effusion  Location: LEFT  Pre-procedure: Patient was identified in [address] [facility]. Consent was obtained and documented from the patient. Procedural pause performed.   Renald Mohs

## 2021-06-03 ENCOUNTER — ORDER TRANSCRIPTION (OUTPATIENT)
Dept: ADMINISTRATIVE | Facility: HOSPITAL | Age: 86
End: 2021-06-03

## 2021-06-03 DIAGNOSIS — Z01.818 PREOP EXAMINATION: Primary | ICD-10-CM

## 2021-06-03 DIAGNOSIS — Z11.59 ENCOUNTER FOR SCREENING FOR OTHER VIRAL DISEASES: ICD-10-CM

## 2021-06-05 ENCOUNTER — LAB ENCOUNTER (OUTPATIENT)
Dept: LAB | Facility: HOSPITAL | Age: 86
End: 2021-06-05
Attending: INTERNAL MEDICINE
Payer: MEDICARE

## 2021-06-05 DIAGNOSIS — Z01.818 PREOP EXAMINATION: ICD-10-CM

## 2021-06-05 DIAGNOSIS — Z11.59 ENCOUNTER FOR SCREENING FOR OTHER VIRAL DISEASES: ICD-10-CM

## 2021-06-08 ENCOUNTER — HOSPITAL ENCOUNTER (OUTPATIENT)
Dept: GENERAL RADIOLOGY | Facility: HOSPITAL | Age: 86
Discharge: HOME | End: 2021-06-08
Attending: INTERNAL MEDICINE
Payer: MEDICARE

## 2021-06-08 ENCOUNTER — HOSPITAL ENCOUNTER (OUTPATIENT)
Dept: ULTRASOUND IMAGING | Facility: HOSPITAL | Age: 86
Discharge: HOME | End: 2021-06-08
Attending: INTERNAL MEDICINE
Payer: MEDICARE

## 2021-06-08 DIAGNOSIS — J90 PLEURAL EFFUSION, RIGHT: ICD-10-CM

## 2021-06-08 PROCEDURE — 71045 X-RAY EXAM CHEST 1 VIEW: CPT | Performed by: INTERNAL MEDICINE

## 2021-06-08 PROCEDURE — 32555 ASPIRATE PLEURA W/ IMAGING: CPT | Performed by: INTERNAL MEDICINE

## 2021-06-08 NOTE — PROCEDURES
Thoracentesis Procedure Note  : Dr. Debbie Oliveira  Indication: right pleural effusion  Location: RIGHT  Pre-procedure: Patient was identified in [address] [facility]. Consent was obtained and documented from the patient. Procedural pause performed.   Ultrasound wa

## 2021-06-17 PROBLEM — I16.0 HYPERTENSIVE URGENCY: Status: RESOLVED | Noted: 2020-10-08 | Resolved: 2021-06-17

## 2021-06-17 PROBLEM — I27.20 PULMONARY HYPERTENSION (HCC): Status: ACTIVE | Noted: 2021-06-17

## 2021-06-17 PROBLEM — E87.1 HYPONATREMIA: Status: RESOLVED | Noted: 2020-10-08 | Resolved: 2021-06-17

## 2021-06-17 PROBLEM — J90 BILATERAL PLEURAL EFFUSION: Status: ACTIVE | Noted: 2021-06-17

## 2021-06-17 PROBLEM — D64.9 ANEMIA: Status: RESOLVED | Noted: 2020-10-08 | Resolved: 2021-06-17

## 2021-06-17 PROBLEM — D64.9 ANEMIA, UNSPECIFIED TYPE: Status: RESOLVED | Noted: 2018-09-25 | Resolved: 2021-06-17

## 2021-06-17 PROBLEM — Z96.652 S/P TKR (TOTAL KNEE REPLACEMENT), LEFT: Status: RESOLVED | Noted: 2019-06-15 | Resolved: 2021-06-17

## 2021-06-17 PROBLEM — I50.31 ACUTE DIASTOLIC CONGESTIVE HEART FAILURE (HCC): Status: RESOLVED | Noted: 2020-09-01 | Resolved: 2021-06-17

## 2021-06-17 PROBLEM — N28.89 HYPERTENSION SECONDARY TO OTHER RENAL DISORDERS: Status: RESOLVED | Noted: 2017-04-05 | Resolved: 2021-06-17

## 2021-06-17 PROBLEM — R06.00 DYSPNEA ON EXERTION: Status: RESOLVED | Noted: 2020-09-01 | Resolved: 2021-06-17

## 2021-06-17 PROBLEM — R07.9 CHEST PAIN OF UNCERTAIN ETIOLOGY: Status: RESOLVED | Noted: 2020-10-08 | Resolved: 2021-06-17

## 2021-06-17 PROBLEM — E66.9 OBESITY (BMI 30-39.9): Status: RESOLVED | Noted: 2019-09-26 | Resolved: 2021-06-17

## 2021-06-17 PROBLEM — D69.2 PURPURA (HCC): Status: ACTIVE | Noted: 2021-06-17

## 2021-06-17 PROBLEM — N18.9 CHRONIC KIDNEY DISEASE, UNSPECIFIED CKD STAGE: Status: RESOLVED | Noted: 2020-10-08 | Resolved: 2021-06-17

## 2021-06-17 PROBLEM — D12.6 ADENOMATOUS POLYP OF COLON, UNSPECIFIED PART OF COLON: Status: RESOLVED | Noted: 2019-09-10 | Resolved: 2021-06-17

## 2021-06-17 PROBLEM — I15.1 HYPERTENSION SECONDARY TO OTHER RENAL DISORDERS: Status: RESOLVED | Noted: 2017-04-05 | Resolved: 2021-06-17

## 2021-06-17 PROBLEM — R19.5 OCCULT BLOOD IN STOOLS: Status: RESOLVED | Noted: 2019-08-13 | Resolved: 2021-06-17

## 2021-06-17 PROBLEM — R77.8 ELEVATED TROPONIN: Status: RESOLVED | Noted: 2020-10-08 | Resolved: 2021-06-17

## 2021-06-17 PROBLEM — R06.09 DYSPNEA ON EXERTION: Status: RESOLVED | Noted: 2020-09-01 | Resolved: 2021-06-17

## 2021-06-17 PROBLEM — Z95.1 S/P CABG X 3: Status: RESOLVED | Noted: 2020-09-24 | Resolved: 2021-06-17

## 2021-06-17 PROBLEM — R01.1 CARDIAC MURMUR: Status: RESOLVED | Noted: 2020-03-03 | Resolved: 2021-06-17

## 2021-06-17 PROBLEM — R79.89 ELEVATED TROPONIN: Status: RESOLVED | Noted: 2020-10-08 | Resolved: 2021-06-17

## 2021-12-17 ENCOUNTER — HOSPITAL ENCOUNTER (EMERGENCY)
Facility: HOSPITAL | Age: 86
Discharge: HOME | End: 2021-12-17
Attending: EMERGENCY MEDICINE
Payer: MEDICARE

## 2021-12-17 ENCOUNTER — APPOINTMENT (OUTPATIENT)
Dept: CT IMAGING | Facility: HOSPITAL | Age: 86
End: 2021-12-17
Attending: EMERGENCY MEDICINE
Payer: MEDICARE

## 2021-12-17 VITALS
HEIGHT: 67 IN | SYSTOLIC BLOOD PRESSURE: 198 MMHG | WEIGHT: 176 LBS | DIASTOLIC BLOOD PRESSURE: 78 MMHG | RESPIRATION RATE: 17 BRPM | TEMPERATURE: 98 F | OXYGEN SATURATION: 98 % | BODY MASS INDEX: 27.62 KG/M2 | HEART RATE: 86 BPM

## 2021-12-17 DIAGNOSIS — S09.90XA INJURY OF HEAD, INITIAL ENCOUNTER: ICD-10-CM

## 2021-12-17 DIAGNOSIS — S00.83XA FOREHEAD CONTUSION, INITIAL ENCOUNTER: Primary | ICD-10-CM

## 2021-12-17 DIAGNOSIS — S00.81XA ABRASION OF FACE, INITIAL ENCOUNTER: ICD-10-CM

## 2021-12-17 PROCEDURE — 99284 EMERGENCY DEPT VISIT MOD MDM: CPT

## 2021-12-17 PROCEDURE — 90471 IMMUNIZATION ADMIN: CPT

## 2021-12-17 PROCEDURE — 70450 CT HEAD/BRAIN W/O DYE: CPT | Performed by: EMERGENCY MEDICINE

## 2021-12-17 NOTE — ED INITIAL ASSESSMENT (HPI)
PATIENT STS FALL ON Sunday. TRIPPED AND FELL ON STEP AND FELL TOWARDS FRONT DOOR AND HIT HEAD ON FIRST STEP. DENIES LOC. PATIENT IS DIALYSIS.

## 2021-12-18 NOTE — ED PROVIDER NOTES
Patient Seen in: [facility] Emergency Department      History   Patient presents with:  Fall    Stated Complaint: fall, head injury, bruising noted to face    Subjective:   HPI    53-year-old male presents to the emergency department from dialysis f MANAGEMENT   • M-SEDAJ BY ELISABETH HUGGINS 5+ YR N/A 4/17/2015    Procedure: CAUDAL;  Surgeon: Ann Marie Lozada MD;  Location: [facility] FOR PAIN MANAGEMENT   • OTHER      AV fistula creation-Nov/2016   • PATIENT DOCUMENTED NOT TO HAVE EXPERIENCED ANY Surgeon: Christian Montes MD;  Location: [address] [place] [address]                Social History    Tobacco Use      Smoking status: Former Smoker        Packs/day: 2.00        Years: 30.00        Pack years: 60        Types: Cigarettes        Start date: 1/ Motor strength is 5/5 and symmetrical in all 4 extremities.   Cerebellar exam including finger-nose and heel shin was normal.    ED Course   Labs Reviewed - No data to display       CT BRAIN OR HEAD (14018)    Result Date: 12/17/2021  PROCEDURE:  CT BRAIN O Impression:  Forehead contusion, initial encounter  (primary encounter diagnosis)  Abrasion of face, initial encounter  Injury of head, initial encounter     Disposition:  Discharge  12/17/2021  5:59 pm    Follow-up:  Dariana Schuster MD  [address]

## 2022-01-04 ENCOUNTER — APPOINTMENT (OUTPATIENT)
Dept: GENERAL RADIOLOGY | Facility: HOSPITAL | Age: 87
End: 2022-01-04
Attending: EMERGENCY MEDICINE
Payer: MEDICARE

## 2022-01-04 ENCOUNTER — HOSPITAL ENCOUNTER (OUTPATIENT)
Facility: HOSPITAL | Age: 87
Setting detail: OBSERVATION
Discharge: HOME | End: 2022-01-07
Attending: EMERGENCY MEDICINE | Admitting: INTERNAL MEDICINE
Payer: MEDICARE

## 2022-01-04 DIAGNOSIS — R53.1 GENERALIZED WEAKNESS: Primary | ICD-10-CM

## 2022-01-04 DIAGNOSIS — U07.1 COVID-19: ICD-10-CM

## 2022-01-04 PROBLEM — N17.9 ACUTE RENAL FAILURE (ARF) (HCC): Status: ACTIVE | Noted: 2022-01-04

## 2022-01-04 PROBLEM — D64.9 ANEMIA: Status: ACTIVE | Noted: 2022-01-04

## 2022-01-04 PROBLEM — N17.9 ACUTE KIDNEY INJURY (HCC): Status: ACTIVE | Noted: 2022-01-04

## 2022-01-04 LAB
ALBUMIN SERPL-MCNC: 3.3 G/DL (ref 3.4–5)
ALBUMIN/GLOB SERPL: 0.8 {RATIO} (ref 1–2)
ALP LIVER SERPL-CCNC: 143 U/L
ALT SERPL-CCNC: 10 U/L
ANION GAP SERPL CALC-SCNC: 10 MMOL/L (ref 0–18)
AST SERPL-CCNC: 9 U/L (ref 15–37)
BASOPHILS # BLD AUTO: 0.06 X10(3) UL (ref 0–0.2)
BASOPHILS NFR BLD AUTO: 0.7 %
BILIRUB SERPL-MCNC: 0.7 MG/DL (ref 0.1–2)
BUN BLD-MCNC: 44 MG/DL (ref 7–18)
CALCIUM BLD-MCNC: 9.1 MG/DL (ref 8.5–10.1)
CHLORIDE SERPL-SCNC: 101 MMOL/L (ref 98–112)
CO2 SERPL-SCNC: 28 MMOL/L (ref 21–32)
CREAT BLD-MCNC: 8.58 MG/DL
EOSINOPHIL # BLD AUTO: 0.13 X10(3) UL (ref 0–0.7)
EOSINOPHIL NFR BLD AUTO: 1.6 %
ERYTHROCYTE [DISTWIDTH] IN BLOOD BY AUTOMATED COUNT: 15.2 %
GLOBULIN PLAS-MCNC: 4 G/DL (ref 2.8–4.4)
GLUCOSE BLD-MCNC: 89 MG/DL (ref 70–99)
HCT VFR BLD AUTO: 33.3 %
HGB BLD-MCNC: 10.3 G/DL
IMM GRANULOCYTES # BLD AUTO: 0.03 X10(3) UL (ref 0–1)
IMM GRANULOCYTES NFR BLD: 0.4 %
LYMPHOCYTES # BLD AUTO: 1.34 X10(3) UL (ref 1–4)
LYMPHOCYTES NFR BLD AUTO: 16.3 %
MAGNESIUM SERPL-MCNC: 2.8 MG/DL (ref 1.6–2.6)
MCH RBC QN AUTO: 34.1 PG (ref 26–34)
MCHC RBC AUTO-ENTMCNC: 30.9 G/DL (ref 31–37)
MCV RBC AUTO: 110.3 FL
MONOCYTES # BLD AUTO: 0.78 X10(3) UL (ref 0.1–1)
MONOCYTES NFR BLD AUTO: 9.5 %
NEUTROPHILS # BLD AUTO: 5.86 X10 (3) UL (ref 1.5–7.7)
NEUTROPHILS # BLD AUTO: 5.86 X10(3) UL (ref 1.5–7.7)
NEUTROPHILS NFR BLD AUTO: 71.5 %
OSMOLALITY SERPL CALC.SUM OF ELEC: 299 MOSM/KG (ref 275–295)
PHOSPHATE SERPL-MCNC: 5.1 MG/DL (ref 2.5–4.9)
PLATELET # BLD AUTO: 149 10(3)UL (ref 150–450)
POTASSIUM SERPL-SCNC: 4.2 MMOL/L (ref 3.5–5.1)
PROT SERPL-MCNC: 7.3 G/DL (ref 6.4–8.2)
RBC # BLD AUTO: 3.02 X10(6)UL
SARS-COV-2 RNA RESP QL NAA+PROBE: DETECTED
SODIUM SERPL-SCNC: 139 MMOL/L (ref 136–145)
WBC # BLD AUTO: 8.2 X10(3) UL (ref 4–11)

## 2022-01-04 PROCEDURE — 93010 ELECTROCARDIOGRAM REPORT: CPT

## 2022-01-04 PROCEDURE — 36415 COLL VENOUS BLD VENIPUNCTURE: CPT

## 2022-01-04 PROCEDURE — 71045 X-RAY EXAM CHEST 1 VIEW: CPT | Performed by: EMERGENCY MEDICINE

## 2022-01-04 PROCEDURE — 85025 COMPLETE CBC W/AUTO DIFF WBC: CPT | Performed by: EMERGENCY MEDICINE

## 2022-01-04 PROCEDURE — 99285 EMERGENCY DEPT VISIT HI MDM: CPT

## 2022-01-04 PROCEDURE — 93005 ELECTROCARDIOGRAM TRACING: CPT

## 2022-01-04 PROCEDURE — 83735 ASSAY OF MAGNESIUM: CPT | Performed by: EMERGENCY MEDICINE

## 2022-01-04 PROCEDURE — 84100 ASSAY OF PHOSPHORUS: CPT | Performed by: EMERGENCY MEDICINE

## 2022-01-04 PROCEDURE — 80053 COMPREHEN METABOLIC PANEL: CPT | Performed by: EMERGENCY MEDICINE

## 2022-01-04 RX ORDER — CARVEDILOL 6.25 MG/1
6.25 TABLET ORAL 2 TIMES DAILY WITH MEALS
Status: DISCONTINUED | OUTPATIENT
Start: 2022-01-04 | End: 2022-01-07

## 2022-01-04 NOTE — ED INITIAL ASSESSMENT (HPI)
Pt to ER for Covid-19 positive test 1/4. Per patient his symptoms started about 2 days ago. Pt states he is on HD but unable to go d/t being sick. Patient states \"now that im testing positive the will not take me to get HD. \" Pt only has complaints of SOB

## 2022-01-05 PROBLEM — U07.1 COVID-19: Status: ACTIVE | Noted: 2022-01-05

## 2022-01-05 LAB
ALBUMIN SERPL-MCNC: 3.2 G/DL (ref 3.4–5)
ALBUMIN/GLOB SERPL: 1 {RATIO} (ref 1–2)
ALP LIVER SERPL-CCNC: 143 U/L
ALT SERPL-CCNC: 9 U/L
ANION GAP SERPL CALC-SCNC: 12 MMOL/L (ref 0–18)
AST SERPL-CCNC: 11 U/L (ref 15–37)
ATRIAL RATE: 59 BPM
BASOPHILS # BLD AUTO: 0.05 X10(3) UL (ref 0–0.2)
BASOPHILS NFR BLD AUTO: 0.6 %
BILIRUB SERPL-MCNC: 0.8 MG/DL (ref 0.1–2)
BUN BLD-MCNC: 47 MG/DL (ref 7–18)
CALCIUM BLD-MCNC: 9 MG/DL (ref 8.5–10.1)
CHLORIDE SERPL-SCNC: 99 MMOL/L (ref 98–112)
CK SERPL-CCNC: 62 U/L
CO2 SERPL-SCNC: 28 MMOL/L (ref 21–32)
CREAT BLD-MCNC: 9.11 MG/DL
CRP SERPL-MCNC: 5.18 MG/DL (ref ?–0.3)
D DIMER PPP FEU-MCNC: 1.93 UG/ML FEU (ref ?–0.84)
DEPRECATED HBV CORE AB SER IA-ACNC: 993.6 NG/ML
EOSINOPHIL # BLD AUTO: 0.07 X10(3) UL (ref 0–0.7)
EOSINOPHIL NFR BLD AUTO: 0.9 %
ERYTHROCYTE [DISTWIDTH] IN BLOOD BY AUTOMATED COUNT: 15.1 %
GLOBULIN PLAS-MCNC: 3.3 G/DL (ref 2.8–4.4)
GLUCOSE BLD-MCNC: 101 MG/DL (ref 70–99)
HBV SURFACE AG SER-ACNC: <0.1 [IU]/L
HBV SURFACE AG SERPL QL IA: NONREACTIVE
HCT VFR BLD AUTO: 31.6 %
HGB BLD-MCNC: 9.8 G/DL
IMM GRANULOCYTES # BLD AUTO: 0.03 X10(3) UL (ref 0–1)
IMM GRANULOCYTES NFR BLD: 0.4 %
LDH SERPL L TO P-CCNC: 221 U/L
LYMPHOCYTES # BLD AUTO: 0.96 X10(3) UL (ref 1–4)
LYMPHOCYTES NFR BLD AUTO: 11.9 %
MAGNESIUM SERPL-MCNC: 2.8 MG/DL (ref 1.6–2.6)
MCH RBC QN AUTO: 34.1 PG (ref 26–34)
MCHC RBC AUTO-ENTMCNC: 31 G/DL (ref 31–37)
MCV RBC AUTO: 110.1 FL
MONOCYTES # BLD AUTO: 0.56 X10(3) UL (ref 0.1–1)
MONOCYTES NFR BLD AUTO: 6.9 %
NEUTROPHILS # BLD AUTO: 6.41 X10 (3) UL (ref 1.5–7.7)
NEUTROPHILS # BLD AUTO: 6.41 X10(3) UL (ref 1.5–7.7)
NEUTROPHILS NFR BLD AUTO: 79.3 %
OSMOLALITY SERPL CALC.SUM OF ELEC: 300 MOSM/KG (ref 275–295)
P AXIS: 54 DEGREES
P-R INTERVAL: 176 MS
PHOSPHATE SERPL-MCNC: 5.5 MG/DL (ref 2.5–4.9)
PLATELET # BLD AUTO: 137 10(3)UL (ref 150–450)
POTASSIUM SERPL-SCNC: 4.4 MMOL/L (ref 3.5–5.1)
PROT SERPL-MCNC: 6.5 G/DL (ref 6.4–8.2)
Q-T INTERVAL: 536 MS
QRS DURATION: 162 MS
QTC CALCULATION (BEZET): 530 MS
R AXIS: -22 DEGREES
RBC # BLD AUTO: 2.87 X10(6)UL
SODIUM SERPL-SCNC: 139 MMOL/L (ref 136–145)
T AXIS: 9 DEGREES
VENTRICULAR RATE: 59 BPM
WBC # BLD AUTO: 8.1 X10(3) UL (ref 4–11)

## 2022-01-05 PROCEDURE — 90935 HEMODIALYSIS ONE EVALUATION: CPT | Performed by: INTERNAL MEDICINE

## 2022-01-05 PROCEDURE — 87340 HEPATITIS B SURFACE AG IA: CPT | Performed by: INTERNAL MEDICINE

## 2022-01-05 PROCEDURE — 86140 C-REACTIVE PROTEIN: CPT | Performed by: HOSPITALIST

## 2022-01-05 PROCEDURE — 85379 FIBRIN DEGRADATION QUANT: CPT | Performed by: HOSPITALIST

## 2022-01-05 PROCEDURE — 84100 ASSAY OF PHOSPHORUS: CPT | Performed by: INTERNAL MEDICINE

## 2022-01-05 PROCEDURE — 83735 ASSAY OF MAGNESIUM: CPT | Performed by: INTERNAL MEDICINE

## 2022-01-05 PROCEDURE — 80053 COMPREHEN METABOLIC PANEL: CPT | Performed by: HOSPITALIST

## 2022-01-05 PROCEDURE — 83615 LACTATE (LD) (LDH) ENZYME: CPT | Performed by: HOSPITALIST

## 2022-01-05 PROCEDURE — 82728 ASSAY OF FERRITIN: CPT | Performed by: HOSPITALIST

## 2022-01-05 PROCEDURE — 85025 COMPLETE CBC W/AUTO DIFF WBC: CPT | Performed by: HOSPITALIST

## 2022-01-05 PROCEDURE — 5A1D70Z PERFORMANCE OF URINARY FILTRATION, INTERMITTENT, LESS THAN 6 HOURS PER DAY: ICD-10-PCS | Performed by: INTERNAL MEDICINE

## 2022-01-05 PROCEDURE — 82550 ASSAY OF CK (CPK): CPT | Performed by: HOSPITALIST

## 2022-01-05 RX ORDER — MELATONIN
3 NIGHTLY PRN
Status: DISCONTINUED | OUTPATIENT
Start: 2022-01-05 | End: 2022-01-07

## 2022-01-05 RX ORDER — SENNOSIDES 8.6 MG
17.2 TABLET ORAL NIGHTLY PRN
Status: DISCONTINUED | OUTPATIENT
Start: 2022-01-05 | End: 2022-01-07

## 2022-01-05 RX ORDER — TORSEMIDE 20 MG/1
20 TABLET ORAL NIGHTLY
Status: DISCONTINUED | OUTPATIENT
Start: 2022-01-05 | End: 2022-01-05

## 2022-01-05 RX ORDER — VIT A/VIT C/VIT E/ZINC/COPPER 2148-113
2 TABLET ORAL DAILY
COMMUNITY

## 2022-01-05 RX ORDER — GUAIFENESIN/DEXTROMETHORPHAN 100-10MG/5
100 SYRUP ORAL EVERY 4 HOURS PRN
Status: DISCONTINUED | OUTPATIENT
Start: 2022-01-05 | End: 2022-01-07

## 2022-01-05 RX ORDER — POLYETHYLENE GLYCOL 3350 17 G/17G
17 POWDER, FOR SOLUTION ORAL DAILY PRN
Status: DISCONTINUED | OUTPATIENT
Start: 2022-01-05 | End: 2022-01-07

## 2022-01-05 RX ORDER — CARVEDILOL 6.25 MG/1
6.25 TABLET ORAL 2 TIMES DAILY WITH MEALS
Status: DISCONTINUED | OUTPATIENT
Start: 2022-01-05 | End: 2022-01-05

## 2022-01-05 RX ORDER — ASPIRIN 81 MG/1
81 TABLET ORAL DAILY
Status: DISCONTINUED | OUTPATIENT
Start: 2022-01-05 | End: 2022-01-07

## 2022-01-05 RX ORDER — HEPARIN SODIUM 5000 [USP'U]/ML
5000 INJECTION, SOLUTION INTRAVENOUS; SUBCUTANEOUS EVERY 8 HOURS SCHEDULED
Status: DISCONTINUED | OUTPATIENT
Start: 2022-01-05 | End: 2022-01-07

## 2022-01-05 RX ORDER — ACETAMINOPHEN 325 MG/1
650 TABLET ORAL EVERY 6 HOURS PRN
Status: DISCONTINUED | OUTPATIENT
Start: 2022-01-05 | End: 2022-01-07

## 2022-01-05 RX ORDER — ATORVASTATIN CALCIUM 20 MG/1
20 TABLET, FILM COATED ORAL NIGHTLY
Status: DISCONTINUED | OUTPATIENT
Start: 2022-01-05 | End: 2022-01-07

## 2022-01-05 RX ORDER — ALLOPURINOL 300 MG/1
300 TABLET ORAL DAILY
Status: DISCONTINUED | OUTPATIENT
Start: 2022-01-05 | End: 2022-01-07

## 2022-01-05 RX ORDER — SENNA AND DOCUSATE SODIUM 50; 8.6 MG/1; MG/1
2 TABLET, FILM COATED ORAL DAILY
COMMUNITY

## 2022-01-05 RX ORDER — TRAMADOL HYDROCHLORIDE 50 MG/1
50 TABLET ORAL EVERY 12 HOURS PRN
Status: DISCONTINUED | OUTPATIENT
Start: 2022-01-05 | End: 2022-01-07

## 2022-01-05 RX ORDER — VITS A,C,E/LUTEIN/MINERALS 300MCG-200
2 TABLET ORAL DAILY
Status: DISCONTINUED | OUTPATIENT
Start: 2022-01-05 | End: 2022-01-07

## 2022-01-05 RX ORDER — ONDANSETRON 2 MG/ML
4 INJECTION INTRAMUSCULAR; INTRAVENOUS EVERY 6 HOURS PRN
Status: DISCONTINUED | OUTPATIENT
Start: 2022-01-05 | End: 2022-01-05

## 2022-01-05 RX ORDER — AMLODIPINE BESYLATE 2.5 MG/1
2.5 TABLET ORAL DAILY
Status: DISCONTINUED | OUTPATIENT
Start: 2022-01-05 | End: 2022-01-06

## 2022-01-05 RX ORDER — CALCIUM CARBONATE 200(500)MG
500 TABLET,CHEWABLE ORAL
Status: DISCONTINUED | OUTPATIENT
Start: 2022-01-05 | End: 2022-01-07

## 2022-01-05 RX ORDER — BISACODYL 10 MG
10 SUPPOSITORY, RECTAL RECTAL
Status: DISCONTINUED | OUTPATIENT
Start: 2022-01-05 | End: 2022-01-07

## 2022-01-05 RX ORDER — METOCLOPRAMIDE HYDROCHLORIDE 5 MG/ML
5 INJECTION INTRAMUSCULAR; INTRAVENOUS EVERY 8 HOURS PRN
Status: DISCONTINUED | OUTPATIENT
Start: 2022-01-05 | End: 2022-01-07

## 2022-01-05 RX ORDER — CHOLECALCIFEROL (VITAMIN D3) 125 MCG
2000 CAPSULE ORAL DAILY
Status: DISCONTINUED | OUTPATIENT
Start: 2022-01-05 | End: 2022-01-07

## 2022-01-05 NOTE — CONSULTS
[facility]  Report of Consultation    Thais Olvera Patient Status:  Observation    1935 MRN [id number]   [facility] 5NW-A Attending Thang Bob MD   Hosp Day # 0 PCP Franci Herrera MD     Reason for Co antacid  500 mg Oral TID CC   • Ocuvite-Lutein  2 tablet Oral Daily   • Vitamin D3 (Cholecalciferol)  2,000 Units Oral Daily   • torsemide  20 mg Oral Nightly   • carvedilol  6.25 mg Oral BID with meals       Continuous Infusion:       PRN Medications:   m COMMENTS)    Comment:\"Black out\"  Keflex                      Comment:Swollen body parts and rash  Pcns [Penicillins]          Comment:Swollen body parts and rash    Review of Systems:   No fevers, chills, change in weight or bowel habits.   Ten point rev 01/05/22  0741   RBC 3.02* 2.87*   HGB 10.3* 9.8*   HCT 33.3* 31.6*   .3* 110.1*   MCH 34.1* 34.1*   MCHC 30.9* 31.0   RDW 15.2 15.1   NEPRELIM 5.86 6.41   WBC 8.2 8.1   .0* 137.0*       No results for input(s): PTP, INR in the last 168 hours

## 2022-01-05 NOTE — CONSULTS
[facility]    Report of Consultation    [facility] Patient Status:  Observation    1935 MRN [id number]   [facility] 5NW-A Attending Janice Zayas MD   Hosp Day # 0 PCP Avtar Menard MD       REASON FOR CONSULT:     E EPIDURAL/SUBARACHNOID; LUMBAR/SACRAL N/A 4/17/2015    Procedure: CAUDAL;  Surgeon: Bryce Ruffin MD;  Location: [facility] FOR PAIN MANAGEMENT   • INJECTION, W/WO CONTRAST, DX/THERAPEUTIC SUBSTANCE, EPIDURAL/SUBARACHNOID; LUMBAR/SACRAL N/A 5/5/2015 Bilateral    • UP GI ENDOSCOPY,BALL DIL,30MM  10/8/2012    Procedure: ESOPHAGOGASTRODUODENOSCOPY, POSSIBLE BIOPSY, POSSIBLE POLYPECTOMY 70692;  Surgeon: Richmond Metzger MD;  Location: [address] [place] [address]   • UPPER GI ENDOSCOPY,BIOPSY  10/8/2012    Pro 20 mg, Oral, Nightly  •  calcium carbonate antacid (TUMS) chewable tab 500 mg, 500 mg, Oral, TID CC  •  Ocuvite-Lutein (OCUVITE - EYE VITAMIN) tab 2 tablet, 2 tablet, Oral, Daily  •  Vitamin D3 (Cholecalciferol) tab 2,000 Units, 2,000 Units, Oral, Daily  • 01/05/2022    RBC 2.87 (L) 01/05/2022    HGB 9.8 (L) 01/05/2022    HCT 31.6 (L) 01/05/2022    .0 (L) 01/05/2022    .1 (H) 01/05/2022    MCH 34.1 (H) 01/05/2022    MCHC 31.0 01/05/2022    RDW 15.1 01/05/2022    NEPRELIM 6.41 01/05/2022    NEPE

## 2022-01-05 NOTE — H&P
General Medicine H&P     Patient presents with:  Covid       PCP: Brad Branch MD    History of Present Illness: Patient is a 82year old male with PMH including but not limited to anxiety, DM, GERD, HTN, HL, MANISH, ESRD on HD who p/t [facility] ED c HTN urgency and creation-Nov/2016   • PATIENT DOCUMENTED NOT TO HAVE EXPERIENCED ANY OF THE FOLLOWING EVENTS  10/8/2012    Procedure: ESOPHAGOGASTRODUODENOSCOPY, POSSIBLE BIOPSY, POSSIBLE POLYPECTOMY 63747;  Surgeon: Nell Lazcano MD;  Location: [address] [place] [address] rash  Hydralazine             OTHER (SEE COMMENTS)    Comment:\"Black out\"  Keflex                      Comment:Swollen body parts and rash  Pcns [Penicillins]          Comment:Swollen body parts and rash     Heparin Sodium (Porcine), 5,000 Units, [facility] organomegaly. Extremities/MSK: Extremities normal/normal movement, atraumatic, no cyanosis  or edema. Skin: Skin color, texture, turgor normal. No rashes or lesions.     Neurologic: Moving all extremities spontaneously, no focal deficit appreciated acute inflammation. SKULL:             No evidence for fracture or osseous abnormality. OTHER:             None. CONCLUSION:  No acute intracranial process.    Dictated by (CST): James Vann MD on 12/17/2021 at 5:54 PM     Finalized by (CST): S BB  - asa    # HL  -statin    # DM  - not on home meds, follow glc on BMP    # ESRD on HD  - M/W/F normally  - renal c/s, plan HD today    # anemia 2/2 ESRD      # Proph  - sqh, d-dimer 1.93, <5x ULN      Outpatient records or previous hospital records rev

## 2022-01-05 NOTE — PROGRESS NOTES
COVID-19 Daily Discharge Readiness-Nursing    O2 Sat at Rest:   92 % on RA  O2 Sat with Exertion: 90% on RA        Temperature max from last 24 hrs: Temp (24hrs), Av.7 °F (36.5 °C), Min:96.7 °F (35.9 °C), Max:98.3 °F (36.8 °C)    Inflammatory Markers:

## 2022-01-05 NOTE — PROGRESS NOTES
NURSING ADMISSION NOTE      Patient admitted via Cart  Oriented to room 531  Safety precautions initiated. Bed in low position. Call light in reach. Received patient from ED on room air. A&Ox4. Admission navigator completed.  Admission orders recei

## 2022-01-05 NOTE — PROGRESS NOTES
[facility] Note:  Renal Dose Adjustment for Tramadol Linda Maurice)    Cecelia Harada has been prescribed Tramadol (ULTRAM) 50 mg orally every 6 hours as needed for pain. Estimated Creatinine Clearance: 6 mL/min (A) (based on SCr of 8.58 mg/dL (H)).

## 2022-01-05 NOTE — ED QUICK NOTES
Orders for admission, patient is aware of plan and ready to go upstairs. Any questions, please call ED RN Marielos at extension [phone].      Patient Covid vaccination status: Fully vaccinated     COVID Test Ordered in ED: Rapid SARS-CoV-2 by PCR    COVID Suspic

## 2022-01-05 NOTE — ED PROVIDER NOTES
Patient Seen in: [facility] Emergency Department      History   Patient presents with:  Covid    Stated Complaint: covid positive today, pt vaccinated and boosted.  missed dialysis yesterday. sat*    Subjective:   HPI    81-year-old with history of c Procedure: CAUDAL;  Surgeon: Jesika Moslye MD;  Location: [facility] FOR PAIN MANAGEMENT   • INJECTION, W/WO CONTRAST, DX/THERAPEUTIC SUBSTANCE, EPIDURAL/SUBARACHNOID; LUMBAR/SACRAL N/A 5/5/2015    Procedure: CAUDAL;  Surgeon: Jesika Mosley MD;  Bora Salas Procedure: ESOPHAGOGASTRODUODENOSCOPY, POSSIBLE BIOPSY, POSSIBLE POLYPECTOMY 69815;  Surgeon: Alejandra Gibbons MD;  Location: [address] [place] [address]   • UPPER GI ENDOSCOPY,BIOPSY  10/8/2012    Procedure: ESOPHAGOGASTRODUODENOSCOPY, POSSIBLE BIOPSY, POS Osmolality 299 (*)     GFR, Non- 5 (*)     GFR, -American 6 (*)     AST 9 (*)     ALT 10 (*)     Alkaline Phosphatase 143 (*)     Albumin 3.3 (*)     A/G Ratio 0.8 (*)     All other components within normal limits   MAGNESIUM - Abnor not be able to go for dialysis at his facility until Thursday. Therefore he will need to be admitted for dialysis. Admission disposition: 1/4/2022  9:32 PM         I spoke with Dr. Margarito Dawn, who will admit him    I spoke with Dr. Levi Vences From nephrology.

## 2022-01-06 LAB
ALBUMIN SERPL-MCNC: 3 G/DL (ref 3.4–5)
ANION GAP SERPL CALC-SCNC: 10 MMOL/L (ref 0–18)
BUN BLD-MCNC: 21 MG/DL (ref 7–18)
CALCIUM BLD-MCNC: 8.8 MG/DL (ref 8.5–10.1)
CHLORIDE SERPL-SCNC: 99 MMOL/L (ref 98–112)
CO2 SERPL-SCNC: 29 MMOL/L (ref 21–32)
CREAT BLD-MCNC: 5.39 MG/DL
GLUCOSE BLD-MCNC: 133 MG/DL (ref 70–99)
OSMOLALITY SERPL CALC.SUM OF ELEC: 291 MOSM/KG (ref 275–295)
PHOSPHATE SERPL-MCNC: 3.6 MG/DL (ref 2.5–4.9)
POTASSIUM SERPL-SCNC: 3.7 MMOL/L (ref 3.5–5.1)
SODIUM SERPL-SCNC: 138 MMOL/L (ref 136–145)

## 2022-01-06 PROCEDURE — 80069 RENAL FUNCTION PANEL: CPT | Performed by: INTERNAL MEDICINE

## 2022-01-06 PROCEDURE — 90935 HEMODIALYSIS ONE EVALUATION: CPT | Performed by: INTERNAL MEDICINE

## 2022-01-06 RX ORDER — AMLODIPINE BESYLATE 2.5 MG/1
2.5 TABLET ORAL 2 TIMES DAILY
Status: DISCONTINUED | OUTPATIENT
Start: 2022-01-06 | End: 2022-01-07

## 2022-01-06 RX ORDER — BENZONATATE 100 MG/1
100 CAPSULE ORAL 3 TIMES DAILY PRN
Status: DISCONTINUED | OUTPATIENT
Start: 2022-01-06 | End: 2022-01-07

## 2022-01-06 NOTE — CM/SW NOTE
Spoke w/Iwona at [facility]. Pt able to get dialysis at [facility] at 10 am on Saturday. Facility asking that pt get dialysis today.  Faxed clinical updates to [phone]

## 2022-01-06 NOTE — CONSULTS
[facility]  TEL: [phone]  FAX: [phone]    4 Jhon Schaffer Patient Status:  Observation    1935 MRN [id number]   [facility] 5NW-A Attending Sherine Garcia MD   Hosp Day # 0 PCP Elaine Don SUBSTANCE, EPIDURAL/SUBARACHNOID; LUMBAR/SACRAL N/A 4/17/2015    Procedure: CAUDAL;  Surgeon: Rachelle Cooks, MD;  Location: [facility] FOR PAIN MANAGEMENT   • INJECTION, W/WO CONTRAST, DX/THERAPEUTIC SUBSTANCE, EPIDURAL/SUBARACHNOID; LUMBAR/SACRAL N/A 5 REPLACEMENT Bilateral    • UP GI ENDOSCOPY,BALL DIL,30MM  10/8/2012    Procedure: ESOPHAGOGASTRODUODENOSCOPY, POSSIBLE BIOPSY, POSSIBLE POLYPECTOMY 47680;  Surgeon: Richmond Metzger MD;  Location: [address] [place] [address]   • UPPER GI ENDOSCOPY,BIOPSY  10/8 (ZYLOPRIM) tab 300 mg, 300 mg, Oral, Daily  •  aspirin EC tab 81 mg, 81 mg, Oral, Daily  •  atorvastatin (LIPITOR) tab 20 mg, 20 mg, Oral, Nightly  •  calcium carbonate antacid (TUMS) chewable tab 500 mg, 500 mg, Oral, TID CC  •  Ocuvite-Lutein (OCUVITE - mellitus with ESRD (end-stage renal disease) ([facility]     DM type 2 with diabetic peripheral neuropathy (HCC)     Microalbuminuria due to type 2 diabetes mellitus ([facility]     ESRD (end stage renal disease) on dialysis ([facility]     Mild nonproliferative diabetic re MD  1/6/2022  12:51 PM

## 2022-01-06 NOTE — PROGRESS NOTES
[facility]    Nephrology Progress Note    Gilberto Price Attending:  Shelby Rodriguez MD       SUBJECTIVE:     Tolerated 1.5L UF yesterday with HD. Complaining of congestion this morning. Still hypertensive.     PHYSICAL EXAM:     Vital Signs: LYPERCENT 11.9 01/05/2022    MOPERCENT 6.9 01/05/2022    EOPERCENT 0.9 01/05/2022    BAPERCENT 0.6 01/05/2022    NE 6.41 01/05/2022    LYMABS 0.96 (L) 01/05/2022    MOABSO 0.56 01/05/2022    EOABSO 0.07 01/05/2022    BAABSO 0.05 01/05/2022     Lab Results starting Saturday  -- HD today to maintain schedule, UF to help HTN    Anemia in ESRD:  -- hold MYRNA given elevated BP, covid infection     CKD-MBD:  -- check phos  -- tums with meals     HTN:  -- continue coreg  -- increase amlodipine to 2.5 mg BID  -- UF

## 2022-01-06 NOTE — PROGRESS NOTES
[facility]  Progress Note    Megha Giron Patient Status:  Observation    1935 MRN [id number]   [facility] 5NW-A Attending Anita Desai MD   Hosp Day # 0 PCP Yamel Daley MD     Subjective:   No chest p out\"  Keflex                      Comment:Swollen body parts and rash  Pcns [Penicillins]          Comment:Swollen body parts and rash    Physical Exam:   Direct examination deferred in the setting of active Covid 19 infection in consideration of public h

## 2022-01-06 NOTE — CM/SW NOTE
Received an order stating pt needs to get dialysis at [facility]. Pt is currently at [facility]. SW asked to speak to manager in regards to getting the pt's dialysis center temporarily moved.  Placed on hold for over 30 minut

## 2022-01-06 NOTE — COVID NURSING ASSESSMENT
COVID-19 Daily Discharge Readiness-Nursing    O2 Sat at Rest: 93% on room air  Temperature max from last 24 hrs: Temp (24hrs), Av.2 °F (36.8 °C), Min:97.7 °F (36.5 °C), Max:98.5 °F (36.9 °C)    Inflammatory Markers: Recent Labs   Lab 22  0741   C

## 2022-01-06 NOTE — PROGRESS NOTES
[facility] Hospitalist Progress Note     Gilberto Price Patient Status:  Observation    1935 MRN [id number]   [facility] 5NW-A Attending Shelby Rodriguez MD   Hosp Day # 0 PCP Tori Paz MD     CC: follow up    SUBJ glycol (PEG 3350), sennosides, bisacodyl, metoclopramide, acetaminophen, traMADol, guaiFENesin-dextromethorphan       Microbiology:    No results found for this visit on 01/04/22.     Lab Results   Component Value Date    COVID19 Detected (A) 01/04/2022

## 2022-01-06 NOTE — PLAN OF CARE
Problem: CARDIOVASCULAR - ADULT  Goal: Maintains optimal cardiac output and hemodynamic stability  Description: INTERVENTIONS:  - Monitor vital signs, rhythm, and trends  - Monitor for bleeding, hypotension and signs of decreased cardiac output  - Evalua ordered  - Assess for signs and symptoms of hyperglycemia and hypoglycemia  - Administer ordered medications to maintain glucose within target range  - Assess barriers to adequate nutritional intake and initiate nutrition consult as needed  - Instruct jluis

## 2022-01-07 VITALS
TEMPERATURE: 98 F | BODY MASS INDEX: 27 KG/M2 | HEART RATE: 63 BPM | RESPIRATION RATE: 16 BRPM | SYSTOLIC BLOOD PRESSURE: 147 MMHG | OXYGEN SATURATION: 96 % | DIASTOLIC BLOOD PRESSURE: 58 MMHG | WEIGHT: 173.94 LBS

## 2022-01-07 LAB
CRP SERPL-MCNC: 3.08 MG/DL (ref ?–0.3)
D DIMER PPP FEU-MCNC: 2.28 UG/ML FEU (ref ?–0.84)
DEPRECATED HBV CORE AB SER IA-ACNC: 1074.5 NG/ML
LDH SERPL L TO P-CCNC: 207 U/L

## 2022-01-07 PROCEDURE — 85379 FIBRIN DEGRADATION QUANT: CPT | Performed by: HOSPITALIST

## 2022-01-07 PROCEDURE — 83615 LACTATE (LD) (LDH) ENZYME: CPT | Performed by: HOSPITALIST

## 2022-01-07 PROCEDURE — 86140 C-REACTIVE PROTEIN: CPT | Performed by: HOSPITALIST

## 2022-01-07 PROCEDURE — 82728 ASSAY OF FERRITIN: CPT | Performed by: HOSPITALIST

## 2022-01-07 RX ORDER — AMLODIPINE BESYLATE 2.5 MG/1
2.5 TABLET ORAL 2 TIMES DAILY
Qty: 120 TABLET | Refills: 5 | Status: SHIPPED | OUTPATIENT
Start: 2022-01-07

## 2022-01-07 RX ORDER — AMLODIPINE BESYLATE 2.5 MG/1
2.5 TABLET ORAL 2 TIMES DAILY
Status: DISCONTINUED | OUTPATIENT
Start: 2022-01-07 | End: 2022-01-07

## 2022-01-07 RX ORDER — AMLODIPINE BESYLATE 5 MG/1
5 TABLET ORAL 2 TIMES DAILY
Qty: 120 TABLET | Refills: 5 | Status: SHIPPED | OUTPATIENT
Start: 2022-01-07 | End: 2022-01-07

## 2022-01-07 RX ORDER — AMLODIPINE BESYLATE 5 MG/1
5 TABLET ORAL 2 TIMES DAILY
Status: DISCONTINUED | OUTPATIENT
Start: 2022-01-07 | End: 2022-01-07

## 2022-01-07 NOTE — COVID NURSING ASSESSMENT
COVID-19 Daily Discharge Readiness-Nursing    O2 Sat at Rest:  SPO2% on Room Air at Rest: 98  %   Temperature max from last 24 hrs: Temp (24hrs), Av.2 °F (36.8 °C), Min:97.7 °F (36.5 °C), Max:98.8 °F (37.1 °C)    Inflammatory Markers: Recent Labs   Lab

## 2022-01-07 NOTE — PROGRESS NOTES
[facility]    Nephrology Progress Note    Dacia Lovell Attending:  Edmund Gale MD       SUBJECTIVE:     Tolerated 2L UF yesterday  Overall feels better but still has some cough    PHYSICAL EXAM:     Vital Signs: /58 (BP Location: Ri MOPERCENT 6.9 01/05/2022    EOPERCENT 0.9 01/05/2022    BAPERCENT 0.6 01/05/2022    NE 6.41 01/05/2022    LYMABS 0.96 (L) 01/05/2022    MOABSO 0.56 01/05/2022    EOABSO 0.07 01/05/2022    BAABSO 0.05 01/05/2022     Lab Results   Component Value Date    MAL discharge to [facility] for TTS schedule while recovering from covid infection if chair time secured     Anemia in ESRD:  -- hold MYRNA given elevated BP, covid infection     CKD-MBD:  -- tums with meals     HTN:  -- continue coreg  -- increased amlod

## 2022-01-07 NOTE — PROGRESS NOTES
Assumed pt care at [facility]. Pt is A&Ox4, glasses, upper/lower dentures, [place]. VSS, afebrile. Maintaining O2 sats WDL on RA. Tele, NSR-SB. Refusing heparin. DW. HD-MWF. Pt received dialysis overnight- 2L removed. Last BM 1/6. Renal diet.  Voids per urinal. Up SB

## 2022-01-07 NOTE — CM/SW NOTE
Faxed flowsheets to [facility]. Informed them the pt would be discharged today.

## 2022-01-07 NOTE — DISCHARGE SUMMARY
[facility] Discharge Summary     Patient ID:  Megha Giron  82year old  4/12/1935    Admit date: 1/4/2022    Discharge date and time: 1/7/2022    Attending Physician: Anita Desai MD     Primary Care Physician: Cathi Bateman BB  - asa  - I d/w Dr. Rommel Al on day of discharge.      # HL  -statin     # DM  - not on home meds, follow glc on BMP     # ESRD on HD  - M/W/F normally  - renal c/s --> HD on saturday     # anemia 2/2 ESRD      Consults: IP CONSULT TO NEPHROLOGY  IP CONSU obtained. COMPARISON:  [facility] , XR, XR CHEST AP PORTABLE  (CPT=71045), 6/08/2021, 7:20 AM.  INDICATIONS:  covid positive today, pt vaccinated and boosted. missed dialysis yesterday.  sats 98%,HR 55  PATIENT STATED HISTORY: (As transcribed by Technologist) Chew Tab  Chew 1 tablet by mouth 3 (three) times daily with meals. HM VITAMIN D3 2000 units Oral Cap  Take 2,000 Units by mouth daily.       TRAMADOL 50 MG Oral Tab  TAKE ONE TABLET BY MOUTH EVERY 6 HOURS AS NEEDED            Code Status: Prior      Exam

## 2022-01-07 NOTE — COVID NURSING ASSESSMENT
COVID-19 Daily Discharge Readiness-Nursing    O2 Sat at Rest:  SPO2% on Room Air at Rest: 98  %   O2 Sat with Exertion:   94 % on   0 liters   Temperature max from last 24 hrs: Temp (24hrs), Av.1 °F (36.7 °C), Min:97.7 °F (36.5 °C), Max:98.8 °F (37.1 °

## 2022-01-07 NOTE — PROGRESS NOTES
01/07/22 1154 01/07/22 1157 01/07/22 1200   Vital Signs   BP (!) 177/58 (!) 182/63 147/58   MAP (mmHg) 89 96 82   BP Location Right arm Right arm Right arm   BP Method Automatic Automatic Automatic   Patient Position Lying Sitting Standing

## 2022-01-07 NOTE — PROGRESS NOTES
01/07/22 1200   Provider Notification   Reason for Communication Other (comment)  (postive orthostatics)   Provider Name Other (comment)  [facility] NP)   Method of Communication Page   Response Waiting for response   Notification Time [phone]   Room 531.  Pt pos

## 2022-01-07 NOTE — PROGRESS NOTES
NURSING DISCHARGE NOTE     Assumed care of patient 0700. Pt A&Ox4. VSS on RA. Voids. Positive orthostatics today, MD made aware. All consults cleared pt for discharge. Discharge instructions given to patient, all questions answered at this time.  Pt sta

## 2022-01-07 NOTE — PROGRESS NOTES
[facility] Cardiology  Progress Note    Linh James Patient Status:  Observation    1935 MRN [id number]   [facility] 5NW-A Attending Angélica Hdz MD   Hosp Day # 0 PCP Nicole Devi MD     Subjective:   No chest p [Penicillins]          Comment:Swollen body parts and rash    Physical Exam:   Direct examination deferred in the setting of active Covid 19 infection in consideration of public health safety.       Laboratory/Data:   Recent Labs   Lab 01/04/22  2047 01/05/

## 2022-01-07 NOTE — PROGRESS NOTES
01/07/22 0522 01/07/22 0524 01/07/22 0526   Vital Signs   BP (!) 191/58 (!) 194/60 (!) 163/61   MAP (mmHg) 94 94 88   BP Location Right arm Right arm Right arm   BP Method Automatic Automatic Automatic   Patient Position Lying Sitting Standing

## 2022-01-08 NOTE — PAYOR COMM NOTE
Discharge Notification    Patient Name: Bud Cobb [address] [place] [address] #: [id number]  Authorization Number: [id number]  Admit Date/Time: 1/4/2022 8:28 PM  Discharge Date/Time: 1/7/2022 3:56 PM

## 2022-01-20 PROBLEM — N17.9 ACUTE KIDNEY INJURY (HCC): Status: RESOLVED | Noted: 2022-01-04 | Resolved: 2022-01-20

## 2022-01-20 PROBLEM — L97.521 SKIN ULCER OF TOE OF LEFT FOOT, LIMITED TO BREAKDOWN OF SKIN (HCC): Status: RESOLVED | Noted: 2020-04-15 | Resolved: 2022-01-20

## 2022-01-20 PROBLEM — N17.9 ACUTE RENAL FAILURE (ARF) (HCC): Status: RESOLVED | Noted: 2022-01-04 | Resolved: 2022-01-20

## 2022-01-20 PROBLEM — J43.9 PULMONARY EMPHYSEMA, UNSPECIFIED EMPHYSEMA TYPE (HCC): Status: ACTIVE | Noted: 2022-01-20

## 2022-02-18 ENCOUNTER — HOSPITAL ENCOUNTER (OUTPATIENT)
Dept: INTERVENTIONAL RADIOLOGY/VASCULAR | Facility: HOSPITAL | Age: 87
Discharge: HOME | End: 2022-02-18
Attending: SURGERY | Admitting: SURGERY
Payer: MEDICARE

## 2022-02-18 VITALS
DIASTOLIC BLOOD PRESSURE: 61 MMHG | BODY MASS INDEX: 25.9 KG/M2 | TEMPERATURE: 98 F | RESPIRATION RATE: 14 BRPM | HEIGHT: 67 IN | SYSTOLIC BLOOD PRESSURE: 168 MMHG | OXYGEN SATURATION: 95 % | HEART RATE: 64 BPM | WEIGHT: 165 LBS

## 2022-02-18 DIAGNOSIS — N18.6 ESRD (END STAGE RENAL DISEASE) (HCC): ICD-10-CM

## 2022-02-18 DIAGNOSIS — T82.898A ANEURYSM OF ARTERIOVENOUS DIALYSIS FISTULA, INITIAL ENCOUNTER (HCC): ICD-10-CM

## 2022-02-18 LAB — INR: 1.1 (ref 0.8–1.3)

## 2022-02-18 PROCEDURE — 36558 INSERT TUNNELED CV CATH: CPT

## 2022-02-18 PROCEDURE — 77001 FLUOROGUIDE FOR VEIN DEVICE: CPT

## 2022-02-18 PROCEDURE — 0JH63WZ INSERTION OF TOTALLY IMPLANTABLE VASCULAR ACCESS DEVICE INTO CHEST SUBCUTANEOUS TISSUE AND FASCIA, PERCUTANEOUS APPROACH: ICD-10-PCS | Performed by: RADIOLOGY

## 2022-02-18 PROCEDURE — 99152 MOD SED SAME PHYS/QHP 5/>YRS: CPT

## 2022-02-18 PROCEDURE — S0077 INJECTION, CLINDAMYCIN PHOSP: HCPCS

## 2022-02-18 PROCEDURE — 76937 US GUIDE VASCULAR ACCESS: CPT

## 2022-02-18 PROCEDURE — 85610 PROTHROMBIN TIME: CPT

## 2022-02-18 RX ORDER — DIPHENHYDRAMINE HYDROCHLORIDE 50 MG/ML
50 INJECTION INTRAMUSCULAR; INTRAVENOUS ONCE AS NEEDED
Status: DISCONTINUED | OUTPATIENT
Start: 2022-02-18 | End: 2022-02-18

## 2022-02-18 RX ORDER — HYDROCODONE BITARTRATE AND ACETAMINOPHEN 5; 325 MG/1; MG/1
1 TABLET ORAL EVERY 4 HOURS PRN
Status: DISCONTINUED | OUTPATIENT
Start: 2022-02-18 | End: 2022-02-21

## 2022-02-18 RX ORDER — CLINDAMYCIN PHOSPHATE 150 MG/ML
INJECTION, SOLUTION INTRAVENOUS
Status: COMPLETED
Start: 2022-02-18 | End: 2022-02-18

## 2022-02-18 RX ORDER — SODIUM CHLORIDE 9 MG/ML
INJECTION, SOLUTION INTRAVENOUS CONTINUOUS
Status: DISCONTINUED | OUTPATIENT
Start: 2022-02-18 | End: 2022-02-21

## 2022-02-18 RX ORDER — LIDOCAINE HYDROCHLORIDE AND EPINEPHRINE 10; 10 MG/ML; UG/ML
INJECTION, SOLUTION INFILTRATION; PERINEURAL
Status: COMPLETED
Start: 2022-02-18 | End: 2022-02-18

## 2022-02-18 RX ORDER — LIDOCAINE HYDROCHLORIDE 10 MG/ML
INJECTION, SOLUTION INFILTRATION; PERINEURAL
Status: COMPLETED
Start: 2022-02-18 | End: 2022-02-18

## 2022-02-18 RX ORDER — ONDANSETRON 2 MG/ML
4 INJECTION INTRAMUSCULAR; INTRAVENOUS ONCE AS NEEDED
Status: DISCONTINUED | OUTPATIENT
Start: 2022-02-18 | End: 2022-02-18

## 2022-02-18 RX ORDER — HEPARIN SODIUM 5000 [USP'U]/ML
INJECTION, SOLUTION INTRAVENOUS; SUBCUTANEOUS
Status: COMPLETED
Start: 2022-02-18 | End: 2022-02-18

## 2022-02-18 RX ORDER — MORPHINE SULFATE 4 MG/ML
2 INJECTION, SOLUTION INTRAMUSCULAR; INTRAVENOUS EVERY 2 HOUR PRN
Status: DISCONTINUED | OUTPATIENT
Start: 2022-02-18 | End: 2022-02-21

## 2022-02-18 RX ORDER — ACETAMINOPHEN 325 MG/1
650 TABLET ORAL EVERY 6 HOURS PRN
Status: DISCONTINUED | OUTPATIENT
Start: 2022-02-18 | End: 2022-02-21

## 2022-02-18 RX ORDER — MIDAZOLAM HYDROCHLORIDE 1 MG/ML
INJECTION INTRAMUSCULAR; INTRAVENOUS
Status: COMPLETED
Start: 2022-02-18 | End: 2022-02-18

## 2022-02-18 RX ADMIN — SODIUM CHLORIDE: 9 INJECTION, SOLUTION INTRAVENOUS at 13:15:00

## 2022-02-18 NOTE — PROCEDURES
Pt and wife report plans for revision of LUE AVF. No prior HD catheters. RIJ 19 cm Pristine. Comp-none.

## 2022-02-18 NOTE — PROGRESS NOTES
Pt s/p permacath placement. Pt came from IR with scant amount of bloody drainage noted from tunneled cath site. Site started oozing again in holding area. Manual pressure held to site X20min with oozing resolved-quick clot placed on site. Post procedure VSS, pt denies pain, tolerating PO intake and ambulation in room. IV d/c'd. Discharge instructions given to pt-verbalized understanding. Pt to lobby via [facility]. Wife to drive home.

## 2022-03-08 ENCOUNTER — HOSPITAL ENCOUNTER (EMERGENCY)
Facility: HOSPITAL | Age: 87
Discharge: HOME | End: 2022-03-08
Attending: EMERGENCY MEDICINE
Payer: MEDICARE

## 2022-03-08 VITALS
WEIGHT: 165 LBS | HEIGHT: 67.72 IN | OXYGEN SATURATION: 96 % | TEMPERATURE: 97 F | HEART RATE: 59 BPM | DIASTOLIC BLOOD PRESSURE: 66 MMHG | RESPIRATION RATE: 20 BRPM | SYSTOLIC BLOOD PRESSURE: 153 MMHG | BODY MASS INDEX: 25.3 KG/M2

## 2022-03-08 DIAGNOSIS — L02.91 ABSCESS: Primary | ICD-10-CM

## 2022-03-08 LAB — GLUCOSE BLD-MCNC: 86 MG/DL (ref 70–99)

## 2022-03-08 PROCEDURE — 82962 GLUCOSE BLOOD TEST: CPT

## 2022-03-08 PROCEDURE — 99283 EMERGENCY DEPT VISIT LOW MDM: CPT

## 2022-03-08 RX ORDER — CLINDAMYCIN HYDROCHLORIDE 300 MG/1
300 CAPSULE ORAL 3 TIMES DAILY
Qty: 30 CAPSULE | Refills: 0 | Status: SHIPPED | OUTPATIENT
Start: 2022-03-08 | End: 2022-03-18

## 2022-03-08 NOTE — ED QUICK NOTES
Pt states that he had a dialysis catheter placed about 1 week ago. Pt states that he had dialysis yesterday and was told to go the ED for possible infection to the site. Pt has bruising around the site and a hematoma.

## 2022-03-08 NOTE — ED INITIAL ASSESSMENT (HPI)
Pt was sent to the ED by Dr. Amanda Hernandez for possible infection to the site of a newly placed dialysis catheter.

## 2022-03-31 ENCOUNTER — HOSPITAL ENCOUNTER (OUTPATIENT)
Dept: GENERAL RADIOLOGY | Age: 87
Discharge: HOME | End: 2022-03-31
Attending: INTERNAL MEDICINE
Payer: MEDICARE

## 2022-03-31 ENCOUNTER — EKG ENCOUNTER (OUTPATIENT)
Dept: LAB | Age: 87
End: 2022-03-31
Attending: SURGERY
Payer: MEDICARE

## 2022-03-31 ENCOUNTER — LAB ENCOUNTER (OUTPATIENT)
Dept: LAB | Age: 87
End: 2022-03-31
Attending: SURGERY
Payer: MEDICARE

## 2022-03-31 DIAGNOSIS — N18.6 ESRD (END STAGE RENAL DISEASE) ON DIALYSIS (HCC): ICD-10-CM

## 2022-03-31 DIAGNOSIS — Z01.818 PRE-OP TESTING: ICD-10-CM

## 2022-03-31 DIAGNOSIS — Z01.812 ENCOUNTER FOR PREOPERATIVE SCREENING LABORATORY TESTING FOR COVID-19 VIRUS: ICD-10-CM

## 2022-03-31 DIAGNOSIS — Z99.2 ESRD (END STAGE RENAL DISEASE) ON DIALYSIS (HCC): ICD-10-CM

## 2022-03-31 DIAGNOSIS — Z20.822 ENCOUNTER FOR PREOPERATIVE SCREENING LABORATORY TESTING FOR COVID-19 VIRUS: ICD-10-CM

## 2022-03-31 DIAGNOSIS — Z01.818 PREOP TESTING: ICD-10-CM

## 2022-03-31 DIAGNOSIS — R06.00 DYSPNEA ON EXERTION: ICD-10-CM

## 2022-03-31 LAB
ALBUMIN SERPL-MCNC: 3.1 G/DL (ref 3.4–5)
ALBUMIN/GLOB SERPL: 0.9 {RATIO} (ref 1–2)
ALP LIVER SERPL-CCNC: 95 U/L
ALT SERPL-CCNC: 12 U/L
ANION GAP SERPL CALC-SCNC: 6 MMOL/L (ref 0–18)
APTT PPP: 32.5 SECONDS (ref 23.3–35.6)
AST SERPL-CCNC: 15 U/L (ref 15–37)
BASOPHILS # BLD AUTO: 0.06 X10(3) UL (ref 0–0.2)
BILIRUB SERPL-MCNC: 1 MG/DL (ref 0.1–2)
BUN BLD-MCNC: 17 MG/DL (ref 7–18)
BUN/CREAT SERPL: 4.3 (ref 10–20)
CALCIUM BLD-MCNC: 8.9 MG/DL (ref 8.5–10.1)
CHLORIDE SERPL-SCNC: 100 MMOL/L (ref 98–112)
CO2 SERPL-SCNC: 32 MMOL/L (ref 21–32)
CREAT BLD-MCNC: 3.92 MG/DL
DEPRECATED RDW RBC AUTO: 70 FL (ref 35.1–46.3)
EOSINOPHIL # BLD AUTO: 0.05 X10(3) UL (ref 0–0.7)
EOSINOPHIL NFR BLD AUTO: 0.8 %
ERYTHROCYTE [DISTWIDTH] IN BLOOD BY AUTOMATED COUNT: 17.1 % (ref 11–15)
FASTING STATUS PATIENT QL REPORTED: NO
GLOBULIN PLAS-MCNC: 3.5 G/DL (ref 2.8–4.4)
GLUCOSE BLD-MCNC: 93 MG/DL (ref 70–99)
HCT VFR BLD AUTO: 30.2 %
HGB BLD-MCNC: 9.9 G/DL
IMM GRANULOCYTES # BLD AUTO: 0.04 X10(3) UL (ref 0–1)
IMM GRANULOCYTES NFR BLD: 0.7 %
INR BLD: 1.15 (ref 0.8–1.2)
LYMPHOCYTES # BLD AUTO: 1.14 X10(3) UL (ref 1–4)
LYMPHOCYTES NFR BLD AUTO: 18.8 %
MCH RBC QN AUTO: 37.1 PG (ref 26–34)
MCHC RBC AUTO-ENTMCNC: 32.8 G/DL (ref 31–37)
MCV RBC AUTO: 113.1 FL
MONOCYTES # BLD AUTO: 0.68 X10(3) UL (ref 0.1–1)
MONOCYTES NFR BLD AUTO: 11.2 %
NEUTROPHILS # BLD AUTO: 4.1 X10 (3) UL (ref 1.5–7.7)
NEUTROPHILS # BLD AUTO: 4.1 X10(3) UL (ref 1.5–7.7)
NEUTROPHILS NFR BLD AUTO: 67.5 %
OSMOLALITY SERPL CALC.SUM OF ELEC: 287 MOSM/KG (ref 275–295)
PLATELET # BLD AUTO: 162 10(3)UL (ref 150–450)
PLATELET MORPHOLOGY: NORMAL
POTASSIUM SERPL-SCNC: 3 MMOL/L (ref 3.5–5.1)
PROT SERPL-MCNC: 6.6 G/DL (ref 6.4–8.2)
PROTHROMBIN TIME: 14.8 SECONDS (ref 11.6–14.8)
RBC # BLD AUTO: 2.67 X10(6)UL
SODIUM SERPL-SCNC: 138 MMOL/L (ref 136–145)
WBC # BLD AUTO: 6.1 X10(3) UL (ref 4–11)

## 2022-03-31 PROCEDURE — 85610 PROTHROMBIN TIME: CPT

## 2022-03-31 PROCEDURE — 85025 COMPLETE CBC W/AUTO DIFF WBC: CPT

## 2022-03-31 PROCEDURE — 93010 ELECTROCARDIOGRAM REPORT: CPT | Performed by: SURGERY

## 2022-03-31 PROCEDURE — 71046 X-RAY EXAM CHEST 2 VIEWS: CPT | Performed by: INTERNAL MEDICINE

## 2022-03-31 PROCEDURE — 85730 THROMBOPLASTIN TIME PARTIAL: CPT

## 2022-03-31 PROCEDURE — 80053 COMPREHEN METABOLIC PANEL: CPT

## 2022-03-31 PROCEDURE — 85060 BLOOD SMEAR INTERPRETATION: CPT

## 2022-03-31 PROCEDURE — 36415 COLL VENOUS BLD VENIPUNCTURE: CPT

## 2022-03-31 PROCEDURE — 93005 ELECTROCARDIOGRAM TRACING: CPT

## 2022-04-01 NOTE — PROGRESS NOTES
CXR shows that you have fluid in your lungs and outside your lungs. Since you are on dialysis, it does looks like more fluid needs to taken off during your dialysis sessions. Please let your nephrologist/HD nurse know about this. Also please make sure you see your cardiologist regarding the fluid overload since it could also be due to your heart. Thanks  Destiny.

## 2022-04-07 ENCOUNTER — ANESTHESIA (OUTPATIENT)
Dept: CARDIAC SURGERY | Facility: HOSPITAL | Age: 87
End: 2022-04-07
Payer: MEDICARE

## 2022-04-07 ENCOUNTER — ANESTHESIA EVENT (OUTPATIENT)
Dept: CARDIAC SURGERY | Facility: HOSPITAL | Age: 87
End: 2022-04-07
Payer: MEDICARE

## 2022-04-07 ENCOUNTER — HOSPITAL ENCOUNTER (OUTPATIENT)
Facility: HOSPITAL | Age: 87
Setting detail: HOSPITAL OUTPATIENT SURGERY
Discharge: HOME | End: 2022-04-07
Attending: SURGERY | Admitting: SURGERY
Payer: MEDICARE

## 2022-04-07 VITALS
SYSTOLIC BLOOD PRESSURE: 144 MMHG | HEIGHT: 67 IN | BODY MASS INDEX: 25.79 KG/M2 | RESPIRATION RATE: 16 BRPM | OXYGEN SATURATION: 96 % | DIASTOLIC BLOOD PRESSURE: 57 MMHG | WEIGHT: 164.31 LBS | TEMPERATURE: 98 F | HEART RATE: 65 BPM

## 2022-04-07 DIAGNOSIS — Z20.822 ENCOUNTER FOR PREOPERATIVE SCREENING LABORATORY TESTING FOR COVID-19 VIRUS: Primary | ICD-10-CM

## 2022-04-07 DIAGNOSIS — Z01.812 ENCOUNTER FOR PREOPERATIVE SCREENING LABORATORY TESTING FOR COVID-19 VIRUS: Primary | ICD-10-CM

## 2022-04-07 DIAGNOSIS — Z99.2 ESRD (END STAGE RENAL DISEASE) ON DIALYSIS (HCC): ICD-10-CM

## 2022-04-07 DIAGNOSIS — Z01.818 PRE-OP TESTING: ICD-10-CM

## 2022-04-07 DIAGNOSIS — N18.6 ESRD (END STAGE RENAL DISEASE) ON DIALYSIS (HCC): ICD-10-CM

## 2022-04-07 LAB
ANION GAP SERPL CALC-SCNC: 5 MMOL/L (ref 0–18)
BUN BLD-MCNC: 14 MG/DL (ref 7–18)
CALCIUM BLD-MCNC: 8.6 MG/DL (ref 8.5–10.1)
CHLORIDE SERPL-SCNC: 101 MMOL/L (ref 98–112)
CO2 SERPL-SCNC: 29 MMOL/L (ref 21–32)
CREAT BLD-MCNC: 3.19 MG/DL
GLUCOSE BLD-MCNC: 68 MG/DL (ref 70–99)
GLUCOSE BLD-MCNC: 75 MG/DL (ref 70–99)
GLUCOSE BLD-MCNC: 82 MG/DL (ref 70–99)
OSMOLALITY SERPL CALC.SUM OF ELEC: 279 MOSM/KG (ref 275–295)
POTASSIUM SERPL-SCNC: 2.9 MMOL/L (ref 3.5–5.1)
SARS-COV-2 RNA RESP QL NAA+PROBE: NOT DETECTED
SODIUM SERPL-SCNC: 135 MMOL/L (ref 136–145)

## 2022-04-07 PROCEDURE — 84132 ASSAY OF SERUM POTASSIUM: CPT | Performed by: SURGERY

## 2022-04-07 PROCEDURE — 82962 GLUCOSE BLOOD TEST: CPT

## 2022-04-07 PROCEDURE — 80048 BASIC METABOLIC PNL TOTAL CA: CPT | Performed by: SURGERY

## 2022-04-07 PROCEDURE — 03170ZF BYPASS RIGHT BRACHIAL ARTERY TO LOWER ARM VEIN, OPEN APPROACH: ICD-10-PCS | Performed by: SURGERY

## 2022-04-07 PROCEDURE — 03L80ZZ OCCLUSION OF LEFT BRACHIAL ARTERY, OPEN APPROACH: ICD-10-PCS | Performed by: SURGERY

## 2022-04-07 RX ORDER — DEXTROSE MONOHYDRATE 25 G/50ML
50 INJECTION, SOLUTION INTRAVENOUS
Status: DISCONTINUED | OUTPATIENT
Start: 2022-04-07 | End: 2022-04-07

## 2022-04-07 RX ORDER — MIDAZOLAM HYDROCHLORIDE 1 MG/ML
1 INJECTION INTRAMUSCULAR; INTRAVENOUS EVERY 5 MIN PRN
Status: DISCONTINUED | OUTPATIENT
Start: 2022-04-07 | End: 2022-04-07

## 2022-04-07 RX ORDER — HYDROMORPHONE HYDROCHLORIDE 1 MG/ML
0.4 INJECTION, SOLUTION INTRAMUSCULAR; INTRAVENOUS; SUBCUTANEOUS EVERY 5 MIN PRN
Status: DISCONTINUED | OUTPATIENT
Start: 2022-04-07 | End: 2022-04-07

## 2022-04-07 RX ORDER — DEXAMETHASONE SODIUM PHOSPHATE 4 MG/ML
VIAL (ML) INJECTION AS NEEDED
Status: DISCONTINUED | OUTPATIENT
Start: 2022-04-07 | End: 2022-04-07 | Stop reason: SURG

## 2022-04-07 RX ORDER — SODIUM CHLORIDE, SODIUM LACTATE, POTASSIUM CHLORIDE, CALCIUM CHLORIDE 600; 310; 30; 20 MG/100ML; MG/100ML; MG/100ML; MG/100ML
INJECTION, SOLUTION INTRAVENOUS CONTINUOUS PRN
Status: DISCONTINUED | OUTPATIENT
Start: 2022-04-07 | End: 2022-04-07 | Stop reason: SURG

## 2022-04-07 RX ORDER — HYDROCODONE BITARTRATE AND ACETAMINOPHEN 5; 325 MG/1; MG/1
2 TABLET ORAL AS NEEDED
Status: DISCONTINUED | OUTPATIENT
Start: 2022-04-07 | End: 2022-04-07

## 2022-04-07 RX ORDER — ACETAMINOPHEN 500 MG
1000 TABLET ORAL ONCE AS NEEDED
Status: DISCONTINUED | OUTPATIENT
Start: 2022-04-07 | End: 2022-04-07

## 2022-04-07 RX ORDER — BUPIVACAINE HYDROCHLORIDE 5 MG/ML
INJECTION, SOLUTION EPIDURAL; INTRACAUDAL AS NEEDED
Status: DISCONTINUED | OUTPATIENT
Start: 2022-04-07 | End: 2022-04-07 | Stop reason: HOSPADM

## 2022-04-07 RX ORDER — METOCLOPRAMIDE HYDROCHLORIDE 5 MG/ML
10 INJECTION INTRAMUSCULAR; INTRAVENOUS AS NEEDED
Status: DISCONTINUED | OUTPATIENT
Start: 2022-04-07 | End: 2022-04-07

## 2022-04-07 RX ORDER — DEXAMETHASONE SODIUM PHOSPHATE 4 MG/ML
4 VIAL (ML) INJECTION AS NEEDED
Status: DISCONTINUED | OUTPATIENT
Start: 2022-04-07 | End: 2022-04-07

## 2022-04-07 RX ORDER — NICOTINE POLACRILEX 4 MG
15 LOZENGE BUCCAL
Status: DISCONTINUED | OUTPATIENT
Start: 2022-04-07 | End: 2022-04-07

## 2022-04-07 RX ORDER — MIDAZOLAM HYDROCHLORIDE 1 MG/ML
INJECTION INTRAMUSCULAR; INTRAVENOUS AS NEEDED
Status: DISCONTINUED | OUTPATIENT
Start: 2022-04-07 | End: 2022-04-07 | Stop reason: SURG

## 2022-04-07 RX ORDER — SODIUM CHLORIDE 9 MG/ML
INJECTION, SOLUTION INTRAVENOUS CONTINUOUS PRN
Status: DISCONTINUED | OUTPATIENT
Start: 2022-04-07 | End: 2022-04-07 | Stop reason: SURG

## 2022-04-07 RX ORDER — HYDROCODONE BITARTRATE AND ACETAMINOPHEN 5; 325 MG/1; MG/1
1 TABLET ORAL AS NEEDED
Status: DISCONTINUED | OUTPATIENT
Start: 2022-04-07 | End: 2022-04-07

## 2022-04-07 RX ORDER — SODIUM CHLORIDE, SODIUM LACTATE, POTASSIUM CHLORIDE, CALCIUM CHLORIDE 600; 310; 30; 20 MG/100ML; MG/100ML; MG/100ML; MG/100ML
INJECTION, SOLUTION INTRAVENOUS CONTINUOUS
Status: DISCONTINUED | OUTPATIENT
Start: 2022-04-07 | End: 2022-04-07

## 2022-04-07 RX ORDER — NALOXONE HYDROCHLORIDE 0.4 MG/ML
80 INJECTION, SOLUTION INTRAMUSCULAR; INTRAVENOUS; SUBCUTANEOUS AS NEEDED
Status: DISCONTINUED | OUTPATIENT
Start: 2022-04-07 | End: 2022-04-07

## 2022-04-07 RX ORDER — ONDANSETRON 2 MG/ML
4 INJECTION INTRAMUSCULAR; INTRAVENOUS AS NEEDED
Status: DISCONTINUED | OUTPATIENT
Start: 2022-04-07 | End: 2022-04-07

## 2022-04-07 RX ORDER — NICOTINE POLACRILEX 4 MG
30 LOZENGE BUCCAL
Status: DISCONTINUED | OUTPATIENT
Start: 2022-04-07 | End: 2022-04-07

## 2022-04-07 RX ORDER — ONDANSETRON 2 MG/ML
INJECTION INTRAMUSCULAR; INTRAVENOUS AS NEEDED
Status: DISCONTINUED | OUTPATIENT
Start: 2022-04-07 | End: 2022-04-07 | Stop reason: SURG

## 2022-04-07 RX ORDER — LIDOCAINE HYDROCHLORIDE 10 MG/ML
INJECTION, SOLUTION EPIDURAL; INFILTRATION; INTRACAUDAL; PERINEURAL AS NEEDED
Status: DISCONTINUED | OUTPATIENT
Start: 2022-04-07 | End: 2022-04-07 | Stop reason: SURG

## 2022-04-07 RX ORDER — LABETALOL HYDROCHLORIDE 5 MG/ML
5 INJECTION, SOLUTION INTRAVENOUS EVERY 5 MIN PRN
Status: DISCONTINUED | OUTPATIENT
Start: 2022-04-07 | End: 2022-04-07

## 2022-04-07 RX ORDER — MEPERIDINE HYDROCHLORIDE 25 MG/ML
12.5 INJECTION INTRAMUSCULAR; INTRAVENOUS; SUBCUTANEOUS AS NEEDED
Status: DISCONTINUED | OUTPATIENT
Start: 2022-04-07 | End: 2022-04-07

## 2022-04-07 RX ADMIN — SODIUM CHLORIDE, SODIUM LACTATE, POTASSIUM CHLORIDE, CALCIUM CHLORIDE: 600; 310; 30; 20 INJECTION, SOLUTION INTRAVENOUS at 08:59:00

## 2022-04-07 RX ADMIN — DEXAMETHASONE SODIUM PHOSPHATE 4 MG: 4 MG/ML VIAL (ML) INJECTION at 09:24:00

## 2022-04-07 RX ADMIN — ONDANSETRON 4 MG: 2 INJECTION INTRAMUSCULAR; INTRAVENOUS at 09:24:00

## 2022-04-07 RX ADMIN — LIDOCAINE HYDROCHLORIDE 25 MG: 10 INJECTION, SOLUTION EPIDURAL; INFILTRATION; INTRACAUDAL; PERINEURAL at 07:41:00

## 2022-04-07 RX ADMIN — SODIUM CHLORIDE: 9 INJECTION, SOLUTION INTRAVENOUS at 07:25:00

## 2022-04-07 RX ADMIN — MIDAZOLAM HYDROCHLORIDE 2 MG: 1 INJECTION INTRAMUSCULAR; INTRAVENOUS at 07:30:00

## 2022-04-07 NOTE — ANESTHESIA POSTPROCEDURE EVALUATION
[facility] Patient Status:  Hospital Outpatient Surgery   Age/Gender 82year old male MRN [id number]   Location [address] Attending Anthony Tovar MD   [address] Day # 0 PCP Trinh Reese MD       Anesthesia Post-op Note    RIGHT ARM ARTERIOVENOUS FISTULA AND LIGATION OF LEFT ARM FISTULA    Procedure Summary     Date: 04/07/22 Room / Location: [address] [place] [address] 01 / [address] [facility]    Anesthesia Start: 0734 Anesthesia Stop: 0439    Procedure: RIGHT ARM ARTERIOVENOUS FISTULA AND LIGATION OF LEFT ARM FISTULA (Bilateral Lower Arm) Diagnosis: (end stage renal disease, aneurysm of arteriovenous dialysis fistula, initial encounter)    Surgeons: Anthony Tovar MD Anesthesiologist: Pb Garibay MD    Anesthesia Type: general ASA Status: 3          Anesthesia Type: general    Vitals Value Taken Time   /60 04/07/22 1008   Temp 98.4 04/07/22 1020   Pulse 61 04/07/22 1019   Resp 13 04/07/22 1019   SpO2 99 % 04/07/22 1019   Vitals shown include unvalidated device data.     Patient Location: PACU    Anesthesia Type: general    Airway Patency: patent    Postop Pain Control: adequate    Mental Status: mildly sedated but able to meaningfully participate in the post-anesthesia evaluation    Nausea/Vomiting: none    Cardiopulmonary/Hydration status: stable euvolemic    Complications: no apparent anesthesia related complications    Postop vital signs: stable    Dental Exam: Unchanged from Preop

## 2022-04-07 NOTE — BRIEF OP NOTE
Pre-Operative Diagnosis: end stage renal disease, aneurysm of arteriovenous dialysis fistula, initial encounter     Post-Operative Diagnosis: end stage renal disease, aneurysm of arteriovenous dialysis fistula, initial encounter      Procedure Performed:   1. Right cephalic to brachial artery av fistula   2. Ligation of left cephalic av fistula     Surgeon(s) and Role:     Dyan Alejandra MD - Primary    Assistant(s):  Surgical Assistant.: Vineet Thompson     Surgical Findings:   1.  Doppler radial on right at completion     Specimen: none     Estimated Blood Loss: 100 mL    Naye Hernandez MD  4/7/2022  9:21 AM

## 2022-04-07 NOTE — PROGRESS NOTES
Family arrived with home O2 machine. Wife and son verbalized concerns about whether patient knew how to use machine. Pt verbalized that he was very familiar and comfortable with the oxygen machine and RN instructed patient to wear O2 all day today and tonight. Pt will be going to dialysis tomorrow and Rn instructed patient to wear o2 until dialysis. Pt and family verb understanding.

## 2022-04-07 NOTE — OPERATIVE REPORT
[facility]    PATIENT'S NAME: Gatito Bermudez    ATTENDING PHYSICIAN: Ozzie Blue M.D. OPERATING PHYSICIAN: Ozzie Blue M.D. PATIENT ACCOUNT#:   [de-identified]    LOCATION:  PACU [place] [facility] PACU 6 [facility] 10  MEDICAL RECORD #:   [id number]       YOB: 1935  ADMISSION DATE:       04/07/2022      OPERATION DATE:  04/07/2022    OPERATIVE REPORT      PREOPERATIVE DIAGNOSIS:  End-stage renal disease, aneurysm of arteriovenous fistula. POSTOPERATIVE DIAGNOSIS:  End-stage renal disease, aneurysm of arteriovenous fistula. PROCEDURE:    1. Right cephalic to brachial artery arteriovenous fistula at the antecubital fossa. 2.   Ligation of left arm cephalic arteriovenous fistula. ASSISTANT:  Bozena East CST, CFA    SPECIMEN:  None. ESTIMATED BLOOD LOSS:  100 mL. INDICATIONS:  This is an 72-year-old male with a significant history of renal failure. 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 We are proceeding with creation of a new fistula and ligation of his previous fistula as described below. FINDINGS:  Doppler radial on the right at completion. OPERATIVE TECHNIQUE:  Patient was taken to the operating room and placed under general anesthesia. Using ultrasound, I mapped the cephalic vein in the right arm and marked the antecubital fossa appropriately. Patient was then prepped and draped in sterile fashion. Time-out performed. I then made a transverse incision at the elbow crease, going through skin and subcutaneous fat down to the cephalic vein. There was a significant amount of scar tissue from his previous IV access and previous blood draws, but I was able to fully mobilize the vein from the surrounding subcutaneous tissues so it would easily reach the brachial artery without any significant tension.   I then dissected out the brachial artery and encircled it with 2 vessel loops. I then disconnected the cephalic vein distally, insufflated it with heparinized saline, spatulated the distal end and then tightened the loops on the brachial artery. I then performed a transverse arteriotomy on the brachial artery and then performed an end-to-side anastomosis of the cephalic vein to the brachial artery. Anastomosis was performed using 6-0 Prolene. After completing the anastomosis, I then released the vessel loops and clamps, and there was excellent flow through the cephalic vein. Due to the amount of scar tissue, we had to take our time and make sure that things were hemostatic, and after controlling any bleeding from the scar tissue from the previous accesses, we then closed the incision with interrupted Vicryl and Monocryl. Dressings were applied. We then turned our attention to the left arm. The left arm was prepped in standard fashion. I made a transverse incision at the previous anastomosis. I dissected out the cephalic vein just distal to the previous anastomosis, placed 2 clamps proximally and distally and transected the vein. I then oversewed the vein using 4-0 Prolene proximally and distally. I then released the clamps, and having ligated the vein I then closed the incision with interrupted Vicryl and a running Monocryl. Patient was then awakened from anesthesia and taken to Recovery.     Dictated By Fred Catalan M.D.  d: 04/07/2022 09:26:39  t: 04/07/2022 10:51:00  [facility] [id number]  YTK/

## 2022-04-07 NOTE — ANESTHESIA PROCEDURE NOTES
Airway  Date/Time: 4/7/2022 7:42 AM  Urgency: elective    Airway not difficult    General Information and Staff    Patient location during procedure: OR  Anesthesiologist: Zoë Benson MD  Performed: anesthesiologist     Indications and Patient Condition  Indications for airway management: anesthesia  Sedation level: deep  Preoxygenated: yes  Patient position: sniffing  MILS maintained throughout  Mask difficulty assessment: 0 - not attempted    Final Airway Details  Final airway type: supraglottic airway      Successful airway: classic  Size 4      Number of attempts at approach: 1 MOB states that she understands all discharge instructions and has no questions at this time.  Car seat checked.

## 2022-06-10 ENCOUNTER — APPOINTMENT (OUTPATIENT)
Dept: GENERAL RADIOLOGY | Facility: HOSPITAL | Age: 87
End: 2022-06-10
Attending: EMERGENCY MEDICINE
Payer: MEDICARE

## 2022-06-10 ENCOUNTER — HOSPITAL ENCOUNTER (INPATIENT)
Facility: HOSPITAL | Age: 87
LOS: 1 days | Discharge: LEFT BEFORE DISCHARGE | End: 2022-06-11
Attending: EMERGENCY MEDICINE | Admitting: HOSPITALIST
Payer: MEDICARE

## 2022-06-10 ENCOUNTER — HOSPITAL ENCOUNTER (OUTPATIENT)
Facility: HOSPITAL | Age: 87
Setting detail: OBSERVATION
Discharge: LEFT BEFORE DISCHARGE | End: 2022-06-11
Attending: EMERGENCY MEDICINE | Admitting: HOSPITALIST
Payer: MEDICARE

## 2022-06-10 DIAGNOSIS — J90 PLEURAL EFFUSION: Primary | ICD-10-CM

## 2022-06-10 DIAGNOSIS — R06.00 DYSPNEA ON EXERTION: ICD-10-CM

## 2022-06-10 DIAGNOSIS — J81.0 ACUTE PULMONARY EDEMA (HCC): ICD-10-CM

## 2022-06-10 PROBLEM — R06.09 DYSPNEA ON EXERTION: Status: ACTIVE | Noted: 2022-06-10

## 2022-06-10 LAB
ALBUMIN SERPL-MCNC: 2.9 G/DL (ref 3.4–5)
ALBUMIN/GLOB SERPL: 0.7 {RATIO} (ref 1–2)
ALP LIVER SERPL-CCNC: 114 U/L
ALT SERPL-CCNC: 10 U/L
ANION GAP SERPL CALC-SCNC: 7 MMOL/L (ref 0–18)
APTT PPP: 34.4 SECONDS (ref 23.3–35.6)
AST SERPL-CCNC: 14 U/L (ref 15–37)
ATRIAL RATE: 62 BPM
BASOPHILS # BLD AUTO: 0.04 X10(3) UL (ref 0–0.2)
BASOPHILS NFR BLD AUTO: 0.7 %
BILIRUB SERPL-MCNC: 0.5 MG/DL (ref 0.1–2)
BUN BLD-MCNC: 25 MG/DL (ref 7–18)
CALCIUM BLD-MCNC: 9.1 MG/DL (ref 8.5–10.1)
CHLORIDE SERPL-SCNC: 99 MMOL/L (ref 98–112)
CO2 SERPL-SCNC: 29 MMOL/L (ref 21–32)
CREAT BLD-MCNC: 3.15 MG/DL
EOSINOPHIL # BLD AUTO: 0.07 X10(3) UL (ref 0–0.7)
EOSINOPHIL NFR BLD AUTO: 1.2 %
ERYTHROCYTE [DISTWIDTH] IN BLOOD BY AUTOMATED COUNT: 16.3 %
GLOBULIN PLAS-MCNC: 4.1 G/DL (ref 2.8–4.4)
GLUCOSE BLD-MCNC: 123 MG/DL (ref 70–99)
HCT VFR BLD AUTO: 28.3 %
HGB BLD-MCNC: 8.8 G/DL
IMM GRANULOCYTES # BLD AUTO: 0.02 X10(3) UL (ref 0–1)
IMM GRANULOCYTES NFR BLD: 0.3 %
INR BLD: 1.2 (ref 0.8–1.2)
LYMPHOCYTES # BLD AUTO: 0.63 X10(3) UL (ref 1–4)
LYMPHOCYTES NFR BLD AUTO: 10.9 %
MCH RBC QN AUTO: 32.8 PG (ref 26–34)
MCHC RBC AUTO-ENTMCNC: 31.1 G/DL (ref 31–37)
MCV RBC AUTO: 105.6 FL
MONOCYTES # BLD AUTO: 0.85 X10(3) UL (ref 0.1–1)
MONOCYTES NFR BLD AUTO: 14.8 %
NEUTROPHILS # BLD AUTO: 4.15 X10 (3) UL (ref 1.5–7.7)
NEUTROPHILS # BLD AUTO: 4.15 X10(3) UL (ref 1.5–7.7)
NEUTROPHILS NFR BLD AUTO: 72.1 %
NT-PROBNP SERPL-MCNC: ABNORMAL PG/ML (ref ?–450)
OSMOLALITY SERPL CALC.SUM OF ELEC: 286 MOSM/KG (ref 275–295)
P AXIS: 53 DEGREES
P-R INTERVAL: 190 MS
PLATELET # BLD AUTO: 137 10(3)UL (ref 150–450)
POTASSIUM SERPL-SCNC: 3.9 MMOL/L (ref 3.5–5.1)
PROT SERPL-MCNC: 7 G/DL (ref 6.4–8.2)
PROTHROMBIN TIME: 15.3 SECONDS (ref 11.6–14.8)
Q-T INTERVAL: 520 MS
QRS DURATION: 162 MS
QTC CALCULATION (BEZET): 527 MS
R AXIS: -20 DEGREES
RBC # BLD AUTO: 2.68 X10(6)UL
SARS-COV-2 RNA RESP QL NAA+PROBE: NOT DETECTED
SODIUM SERPL-SCNC: 135 MMOL/L (ref 136–145)
T AXIS: -11 DEGREES
VENTRICULAR RATE: 62 BPM
WBC # BLD AUTO: 5.8 X10(3) UL (ref 4–11)

## 2022-06-10 PROCEDURE — 93005 ELECTROCARDIOGRAM TRACING: CPT

## 2022-06-10 PROCEDURE — 85730 THROMBOPLASTIN TIME PARTIAL: CPT | Performed by: EMERGENCY MEDICINE

## 2022-06-10 PROCEDURE — 93010 ELECTROCARDIOGRAM REPORT: CPT

## 2022-06-10 PROCEDURE — 99285 EMERGENCY DEPT VISIT HI MDM: CPT

## 2022-06-10 PROCEDURE — 71045 X-RAY EXAM CHEST 1 VIEW: CPT | Performed by: EMERGENCY MEDICINE

## 2022-06-10 PROCEDURE — 83880 ASSAY OF NATRIURETIC PEPTIDE: CPT | Performed by: EMERGENCY MEDICINE

## 2022-06-10 PROCEDURE — 85610 PROTHROMBIN TIME: CPT | Performed by: EMERGENCY MEDICINE

## 2022-06-10 PROCEDURE — 80053 COMPREHEN METABOLIC PANEL: CPT | Performed by: EMERGENCY MEDICINE

## 2022-06-10 PROCEDURE — 36415 COLL VENOUS BLD VENIPUNCTURE: CPT

## 2022-06-10 PROCEDURE — 85025 COMPLETE CBC W/AUTO DIFF WBC: CPT | Performed by: EMERGENCY MEDICINE

## 2022-06-10 RX ORDER — ATORVASTATIN CALCIUM 20 MG/1
20 TABLET, FILM COATED ORAL NIGHTLY
Status: DISCONTINUED | OUTPATIENT
Start: 2022-06-10 | End: 2022-06-11

## 2022-06-10 RX ORDER — ALLOPURINOL 300 MG/1
300 TABLET ORAL DAILY
Status: DISCONTINUED | OUTPATIENT
Start: 2022-06-11 | End: 2022-06-11

## 2022-06-10 RX ORDER — ACETAMINOPHEN 325 MG/1
650 TABLET ORAL EVERY 6 HOURS PRN
Status: DISCONTINUED | OUTPATIENT
Start: 2022-06-10 | End: 2022-06-11

## 2022-06-10 RX ORDER — TRAMADOL HYDROCHLORIDE 50 MG/1
50 TABLET ORAL EVERY 12 HOURS
Status: DISCONTINUED | OUTPATIENT
Start: 2022-06-10 | End: 2022-06-11

## 2022-06-10 RX ORDER — ASPIRIN 81 MG/1
81 TABLET ORAL DAILY
Status: DISCONTINUED | OUTPATIENT
Start: 2022-06-11 | End: 2022-06-11

## 2022-06-10 RX ORDER — ONDANSETRON 2 MG/ML
4 INJECTION INTRAMUSCULAR; INTRAVENOUS EVERY 4 HOURS PRN
Status: DISCONTINUED | OUTPATIENT
Start: 2022-06-10 | End: 2022-06-10

## 2022-06-10 RX ORDER — TORSEMIDE 20 MG/1
20 TABLET ORAL
Status: DISCONTINUED | OUTPATIENT
Start: 2022-06-11 | End: 2022-06-11

## 2022-06-10 RX ORDER — HEPARIN SODIUM 5000 [USP'U]/ML
5000 INJECTION, SOLUTION INTRAVENOUS; SUBCUTANEOUS EVERY 8 HOURS SCHEDULED
Status: DISCONTINUED | OUTPATIENT
Start: 2022-06-10 | End: 2022-06-11

## 2022-06-10 RX ORDER — SENNA AND DOCUSATE SODIUM 50; 8.6 MG/1; MG/1
2 TABLET, FILM COATED ORAL DAILY
Status: DISCONTINUED | OUTPATIENT
Start: 2022-06-10 | End: 2022-06-11

## 2022-06-10 RX ORDER — CALCIUM CARBONATE 200(500)MG
500 TABLET,CHEWABLE ORAL
Status: DISCONTINUED | OUTPATIENT
Start: 2022-06-11 | End: 2022-06-11

## 2022-06-10 RX ORDER — ONDANSETRON 2 MG/ML
4 INJECTION INTRAMUSCULAR; INTRAVENOUS EVERY 6 HOURS PRN
Status: DISCONTINUED | OUTPATIENT
Start: 2022-06-10 | End: 2022-06-11

## 2022-06-10 RX ORDER — AMLODIPINE BESYLATE 2.5 MG/1
2.5 TABLET ORAL 2 TIMES DAILY
Status: DISCONTINUED | OUTPATIENT
Start: 2022-06-10 | End: 2022-06-11

## 2022-06-10 RX ORDER — CARVEDILOL 6.25 MG/1
6.25 TABLET ORAL 2 TIMES DAILY WITH MEALS
Status: DISCONTINUED | OUTPATIENT
Start: 2022-06-11 | End: 2022-06-11

## 2022-06-10 RX ORDER — METOCLOPRAMIDE HYDROCHLORIDE 5 MG/ML
5 INJECTION INTRAMUSCULAR; INTRAVENOUS EVERY 8 HOURS PRN
Status: DISCONTINUED | OUTPATIENT
Start: 2022-06-10 | End: 2022-06-11

## 2022-06-10 NOTE — ED INITIAL ASSESSMENT (HPI)
Pt c/o WILLIE x \"several weeks\", saw a pulmonologist on Tuesday and was told he needed a thoracentesis.

## 2022-06-10 NOTE — ED QUICK NOTES
Pt awake and alert, skin w/d,resps reg/slightly labored. Pt resting comfortably on cart. Wife at bedside.

## 2022-06-11 ENCOUNTER — APPOINTMENT (OUTPATIENT)
Dept: CT IMAGING | Facility: HOSPITAL | Age: 87
End: 2022-06-11
Attending: INTERNAL MEDICINE
Payer: MEDICARE

## 2022-06-11 VITALS
TEMPERATURE: 98 F | BODY MASS INDEX: 25.12 KG/M2 | HEIGHT: 67 IN | HEART RATE: 60 BPM | WEIGHT: 160.06 LBS | DIASTOLIC BLOOD PRESSURE: 39 MMHG | SYSTOLIC BLOOD PRESSURE: 116 MMHG | OXYGEN SATURATION: 100 % | RESPIRATION RATE: 20 BRPM

## 2022-06-11 LAB
ANION GAP SERPL CALC-SCNC: 7 MMOL/L (ref 0–18)
BASOPHILS # BLD AUTO: 0.05 X10(3) UL (ref 0–0.2)
BASOPHILS NFR BLD AUTO: 0.9 %
BUN BLD-MCNC: 31 MG/DL (ref 7–18)
CALCIUM BLD-MCNC: 9.3 MG/DL (ref 8.5–10.1)
CHLORIDE SERPL-SCNC: 99 MMOL/L (ref 98–112)
CO2 SERPL-SCNC: 30 MMOL/L (ref 21–32)
CREAT BLD-MCNC: 3.92 MG/DL
EOSINOPHIL # BLD AUTO: 0.08 X10(3) UL (ref 0–0.7)
EOSINOPHIL NFR BLD AUTO: 1.5 %
ERYTHROCYTE [DISTWIDTH] IN BLOOD BY AUTOMATED COUNT: 16.2 %
GLUCOSE BLD-MCNC: 78 MG/DL (ref 70–99)
HCT VFR BLD AUTO: 28.2 %
HGB BLD-MCNC: 8.4 G/DL
IMM GRANULOCYTES # BLD AUTO: 0.02 X10(3) UL (ref 0–1)
IMM GRANULOCYTES NFR BLD: 0.4 %
LYMPHOCYTES # BLD AUTO: 0.91 X10(3) UL (ref 1–4)
LYMPHOCYTES NFR BLD AUTO: 16.9 %
MCH RBC QN AUTO: 32.4 PG (ref 26–34)
MCHC RBC AUTO-ENTMCNC: 29.8 G/DL (ref 31–37)
MCV RBC AUTO: 108.9 FL
MONOCYTES # BLD AUTO: 0.69 X10(3) UL (ref 0.1–1)
MONOCYTES NFR BLD AUTO: 12.8 %
NEUTROPHILS # BLD AUTO: 3.64 X10 (3) UL (ref 1.5–7.7)
NEUTROPHILS # BLD AUTO: 3.64 X10(3) UL (ref 1.5–7.7)
NEUTROPHILS NFR BLD AUTO: 67.5 %
OSMOLALITY SERPL CALC.SUM OF ELEC: 287 MOSM/KG (ref 275–295)
PLATELET # BLD AUTO: 156 10(3)UL (ref 150–450)
POTASSIUM SERPL-SCNC: 4.3 MMOL/L (ref 3.5–5.1)
RBC # BLD AUTO: 2.59 X10(6)UL
SODIUM SERPL-SCNC: 136 MMOL/L (ref 136–145)
WBC # BLD AUTO: 5.4 X10(3) UL (ref 4–11)

## 2022-06-11 PROCEDURE — 80048 BASIC METABOLIC PNL TOTAL CA: CPT | Performed by: HOSPITALIST

## 2022-06-11 PROCEDURE — 85025 COMPLETE CBC W/AUTO DIFF WBC: CPT | Performed by: HOSPITALIST

## 2022-06-11 PROCEDURE — 71250 CT THORAX DX C-: CPT | Performed by: INTERNAL MEDICINE

## 2022-06-11 NOTE — ED QUICK NOTES
Pt awake and alert, skin w/d,resps reg/slightly labored. Pt repositioned on cart for comfort. Pt aware awaiting bed assignment. Wife remains at bedside. Dinner tray ordered at this time.

## 2022-06-11 NOTE — PROGRESS NOTES
DI Pulm addendum:    - seen by Dr. Zak Edmonds earlier today; CT chest was ordered. - CT reviewed. Revealed various abnormalities including a partially loculated mod to large R effusion with significant pleural thickening and rind around lung tissue. I discussed with patient and explained that if this is removed via a thoracentesis, there is a good chance the lung won't re-expand in that area and as a result, would lead to PTX ex-vacuo. I feel best option is to proceed with IR guided small bore chest tube; if lung doesn't expand, consider MIST protocol and if no improvement then consider decortication. In meantime, pt may benefit from further volume removal via HD; d/w renal who was at bedside and is in agreement. - d/w family at length. They voiced understanding. Pt was very angry at this plan and wanted to leave AMA.     Addis Quick MD

## 2022-06-11 NOTE — PLAN OF CARE
Pt transferred from ER around 2100. : Came to ED for difficulty breathing, recently went to pulmonolgist office - was told he needs thoracentesis, but was unable to schedule. Alert and oriented x 4. Pt appears to have labored breathing, conversational dyspnea. SPO2 98% on 3L NC - pt has home oxygen PRN, has not needed to use it until just recently. Pt is in tripod position and using pursed lip breathing. Pt appears to be in sinus rhythm on tele with occasional PVC's. VS stable. Pt denies any chest pain or n/v at this time. CXR: cardiomegaly, pleural effusion and pulmonary edema. proBNP (151,704), COVID (-). Pt reports having HD today. Two new fistulas to Right upper arm- significant bruising noted. RIGHT and LEFT arm precautions  d/t AV fistulas. BP's taken in Left forearm. R chest permacath - CDI. Pt is NPO per orders. Pulmonology and nephrology consulted per ER MD note. Pt is [place] - bilateral hearing aides. Admission navigator completed, pt has been oriented to room. Safety precautions in place. Bed alarm on and in lowest position. Call light demonstrated and within reach. Personal items at bedside. Will continue to monitor.                Problem: Patient/Family Goals  Goal: Patient/Family Long Term Goal  Description: Patient's Long Term Goal: \" Go home\"     Interventions:  - monitor SPO2  - supplemental oxygen - wean as tolerated  - pulm consult   - neph consult   - See additional Care Plan goals for specific interventions  Outcome: Progressing  Goal: Patient/Family Short Term Goal  Description: Patient's Short Term Goal: \" not be so short of breath\"     Interventions:   - monitor fluid volume  - monitor SPO2  - supplemental oxygen   -   - See additional Care Plan goals for specific interventions  Outcome: Progressing     Problem: RESPIRATORY - ADULT  Goal: Achieves optimal ventilation and oxygenation  Description: INTERVENTIONS:  - Assess for changes in respiratory status  - Assess for changes in mentation and behavior  - Position to facilitate oxygenation and minimize respiratory effort  - Oxygen supplementation based on oxygen saturation or ABGs  - Provide Smoking Cessation handout, if applicable  - Encourage broncho-pulmonary hygiene including cough, deep breathe, Incentive Spirometry  - Assess the need for suctioning and perform as needed  - Assess and instruct to report SOB or any respiratory difficulty  - Respiratory Therapy support as indicated  - Manage/alleviate anxiety  - Monitor for signs/symptoms of CO2 retention  Outcome: Progressing

## 2022-06-11 NOTE — PLAN OF CARE
Assumed care at 0730. Patient alert, oriented x4. Oxygenation stable on room air. Dyspnea on exertion, lung sounds diminished with crackles bilaterally. Tele: normal sinus rhythm. Pt did not note pain. Pt continent of bowel and bladder. Diminished urine production with dialysis. CT chest today. No plan for thoracentesis per pulmonology. Patient updated on plan of care. Questions answered. Patient and family had concerns about plan of care. Contacted Dr. Rosa Mann and Dr. Kunal Estevez. MDs to bedside.     Problem: Patient/Family Goals  Goal: Patient/Family Long Term Goal  Description: Patient's Long Term Goal: \" Go home\"     Interventions:  - monitor SPO2  - supplemental oxygen - wean as tolerated  - pulm consult   - neph consult   - See additional Care Plan goals for specific interventions  Outcome: Progressing  Goal: Patient/Family Short Term Goal  Description: Patient's Short Term Goal: \" not be so short of breath\"     Interventions:   - monitor fluid volume  - monitor SPO2  - supplemental oxygen   - See additional Care Plan goals for specific interventions  Outcome: Progressing     Problem: RESPIRATORY - ADULT  Goal: Achieves optimal ventilation and oxygenation  Description: INTERVENTIONS:  - Assess for changes in respiratory status  - Assess for changes in mentation and behavior  - Position to facilitate oxygenation and minimize respiratory effort  - Oxygen supplementation based on oxygen saturation or ABGs  - Provide Smoking Cessation handout, if applicable  - Encourage broncho-pulmonary hygiene including cough, deep breathe, Incentive Spirometry  - Assess the need for suctioning and perform as needed  - Assess and instruct to report SOB or any respiratory difficulty  - Respiratory Therapy support as indicated  - Manage/alleviate anxiety  - Monitor for signs/symptoms of CO2 retention  Outcome: Progressing

## 2022-06-11 NOTE — PROGRESS NOTES
Pharmacy Note: Renal dose adjustment for Tramadol (Ultram)  Paula Vincent has been prescribed Tramadol (Ultram)  50 mg orally every 6 hours as needed for pain. Estimated Creatinine Clearance: 16 mL/min (A) (based on SCr of 3.15 mg/dL (H)). His calculated creatinine clearance is < 30 mL/min, therefore, the dose of Tramadol (Ultram) has been changed to 50 mg  every 12 hours as needed for pain per P&T approved protocol. Pharmacy will follow and resume original order if renal function improves.     Thank you,   Jose Hansen, [facility]  6/10/2022,  11:43 PM

## 2022-06-11 NOTE — PLAN OF CARE
Patient chose to leave AMA. Discussed benefits and risks of leaving AMA with patient and family. Patient and family verbalized understanding. Notified all care team members that patient was leaving. IV removed. Tele discontinued and returned to monitor tech.

## 2022-06-11 NOTE — ED QUICK NOTES
Bedside report received from [facility]. Patient and wife updated on plan of care, all questions answered at this time. Patient provided menu at this time.

## 2022-06-11 NOTE — ED QUICK NOTES
Orders for admission, patient is aware of plan and ready to go upstairs. Any questions, please call ED RN Amanda Bell at extension [phone]. Patient Covid vaccination status: Fully vaccinated     COVID Test Ordered in ED: Rapid SARS-CoV-2 by PCR    COVID Suspicion at Admission: Low clinical suspicion for COVID    Running Infusions:  None    Mental Status/LOC at time of transport: A/O x3    Other pertinent information: Wife at bedside.    CIWA score: N/A   NIH score:  N/A

## 2022-06-14 NOTE — PAYOR COMM NOTE
--------------  DISCHARGE REVIEW    Payor: [facility] Correa [place] #:  [id number]  Authorization Number: [id number]    Admit date: 6/10/22  Admit time:   8:48 PM  Discharge Date: 6/11/2022  6:26 PM     Admitting Physician: Klaus Alfonso MD  Primary Care Physician: Elroy De Anda MD       REVIEWER COMMENTS    FOR FINAL REVIEW/APPROVAL OF ALL INPT DAYS

## 2022-10-04 NOTE — H&P
[facility] hospitalist H+P  PCP Priyank Shearer MD  CC dizzy, SOB  HPI 83 yo male with multiple medical problems including but not limited to DM2, ESRD on HD, HTN, HL here due to dizziness for 1 week, generalized weakness and CORBIN.  No chest pain or SOB at rest. No ab file      Years of education: Not on file      Highest education level: Not on file    Occupational History      Not on file    Social Needs      Financial resource strain: Not on file      Food insecurity:        Worry: Not on file        Inability: Not o Narrative      Not on file    All   Cephalexin              SWELLING  Erythromycin Base           Comment:Swollen body parts and rash  Hydralazine             OTHER (SEE COMMENTS)    Comment:\"Black out\"  Keflex                      Comment:Swollen body p Gen: awake, alert, no respiratory distress  HEENT; mm dry, anicteric, EOMI  Neck supple  Lymph no tender cervical LAD  CV:  nl S1/S2  Pulm: no wheezing  Abd; soft, not tender, +BS  Ext: no calf tendenress b/l  Vascular + b/l radial and DP pulses  Skin Yes - the patient is able to be screened

## (undated) DEVICE — HEMOCLIP HORIZON SM MULTI

## (undated) DEVICE — TRANSPOSAL ULTRAFLEX DUO/QUAD ULTRA CART MANIFOLD

## (undated) DEVICE — SUTURE SILK 3-0

## (undated) DEVICE — PDS II VLT 0 107CM AG ST3: Brand: ENDOLOOP

## (undated) DEVICE — EXOFIN TISSUE ADHESIVE 1.0ML

## (undated) DEVICE — CHLORAPREP 26ML APPLICATOR

## (undated) DEVICE — CV PACK-LF: Brand: MEDLINE INDUSTRIES, INC.

## (undated) DEVICE — GOWN SURG AERO CHROME XXL

## (undated) DEVICE — SUTURE SILK 0 FSL

## (undated) DEVICE — STANDARD HYPODERMIC NEEDLE,POLYPROPYLENE HUB: Brand: MONOJECT

## (undated) DEVICE — OPEN HEART: Brand: MEDLINE INDUSTRIES, INC.

## (undated) DEVICE — SUTURE PROLENE 4-0 V-5

## (undated) DEVICE — SYRINGE 20CC LL TIP

## (undated) DEVICE — GEL AQUASONIC 100 20GR

## (undated) DEVICE — INDICATED FOR SURGICAL CLAMPING DURING CARDIOVASCULAR PERIPHERAL VASCULAR, AND GENERAL SURGERY.: Brand: SOFT/FIBRA® SPRING CLIP

## (undated) DEVICE — KENDALL SCD EXPRESS SLEEVES, KNEE LENGTH, MEDIUM: Brand: KENDALL SCD

## (undated) DEVICE — BASIC DOUBLE BASIN 1-LF: Brand: MEDLINE INDUSTRIES, INC.

## (undated) DEVICE — SUTURE SILK 2-0

## (undated) DEVICE — SUTURE MONOCRYL 4-0 PS-2

## (undated) DEVICE — CELL SAVER 5/5+ BOWL KIT-225ML: Brand: HAEMONETICS CELL SAVER 5/5+ SYSTEMS

## (undated) DEVICE — SUTURE VICRYL 3-0 SH

## (undated) DEVICE — SUTURE PROLENE 6-0 C-1

## (undated) DEVICE — DERMABOND LIQUID ADHESIVE

## (undated) DEVICE — INTENDED TO BE USED TO OCCLUDE, RETRACT AND IDENTIFY ARTERIES, VEINS, TENDONS AND NERVES IN SURGICAL PROCEDURES: Brand: STERION®  VESSEL LOOP

## (undated) DEVICE — TRAY ENDOVEIN KTV16 HARVESTING

## (undated) DEVICE — SUTURE PROLENE 6-0 BV-1

## (undated) DEVICE — SUTURE PROLENE 7-0 CC

## (undated) DEVICE — GAUZE SPONGES,12 PLY: Brand: CURITY

## (undated) DEVICE — CELL SAVER BAG 600ML 4R2023

## (undated) DEVICE — SOL  .9 1000ML BTL

## (undated) DEVICE — ABSORBABLE HEMOSTAT (OXIDIZED REGENERATED CELLULOSE, U.S.P.): Brand: SURGICEL

## (undated) DEVICE — SUTURE PROLENE 4-0 SH

## (undated) DEVICE — SUTURE PROLENE 5-0 C-1

## (undated) DEVICE — SYRINGE 30ML LL TIP

## (undated) DEVICE — CELL SAVER RESERVOIR BRAT

## (undated) DEVICE — SOL  .9 500ML

## (undated) DEVICE — SUTURE POLYDEK 2-0

## (undated) DEVICE — VIOLET BRAIDED (POLYGLACTIN 910), SYNTHETIC ABSORBABLE SUTURE: Brand: COATED VICRYL

## (undated) DEVICE — SOL LACT RINGERS 1000ML

## (undated) DEVICE — DRAPE,EXTREMITY,89X128,STERILE: Brand: MEDLINE

## (undated) DEVICE — SURGICAL POWDER 3.0G

## (undated) DEVICE — SYSTEM ZDRIVE NLTX DISPOSABLE

## (undated) DEVICE — HEMOCLIP HORIZON LG 004200

## (undated) DEVICE — STERILE POLYISOPRENE POWDER-FREE SURGICAL GLOVES: Brand: PROTEXIS

## (undated) DEVICE — Device

## (undated) DEVICE — DECANTER BAG 9": Brand: MEDLINE INDUSTRIES, INC.

## (undated) DEVICE — 3M™ TEGADERM™ TRANSPARENT FILM DRESSING, 1626W, 4 IN X 4-3/4 IN (10 CM X 12 CM), 50 EACH/CARTON, 4 CARTON/CASE: Brand: 3M™ TEGADERM™

## (undated) DEVICE — GOWN,SIRUS,FAB REINF,RAGLAN,XL,STERILE: Brand: MEDLINE

## (undated) DEVICE — SPONGE LAP 18X18 XRAY STRL

## (undated) DEVICE — FIXED CORE WIRE GUIDE SAFE-T-J, CURVED: Brand: COOK

## (undated) DEVICE — GLOVE SURG TRIUMPH SZ 8

## (undated) DEVICE — GLOVE BIOGEL M SURG SZ 71/2

## (undated) DEVICE — SCD SLEEVE KNEE HI BLEND

## (undated) DEVICE — SUTURE WIRE DOUBLE STERNOTOMY

## (undated) DEVICE — CAUTERY NEEDLE 2IN INS E1465

## (undated) NOTE — MR AVS SNAPSHOT
[address], [address] [phone]               Thank you for choosing us for your health care visit with Vickey Mueller MD.  We are glad to serve you and happy to provide you with [facility] allopurinol 300 MG Tabs   TAKE 1 TABLET BY MOUTH EVERY DAY   Commonly known as:  ZYLOPRIM           AmLODIPine Besylate 10 MG Tabs   Take 10 mg by mouth daily.    Commonly known as:  NORVASC           Atorvastatin Calcium 20 MG Tabs   TAKE ONE TABLET BY MO Educational Information     Healthy Diet and Regular Exercise  [employer] [facility] [employer] [address] for making healthy food choices  -   Enjoy your food, but eat less. Fully enjoy your food when eating. Don’t eat while distracted and slow down.    Avoid

## (undated) NOTE — LETTER
[facility]  Jasonnicole Pottergladis 63 [address] [facility], [address] [place] [address]    Consent for Operation    Date: __________________    Time: _______________    1.  I authorize the performance upon Francisco Javier Loo the following operation:      revision of left arteriovenous videotape. The [facility] will not be responsible for storage or maintenance of this tape. 6. For the purpose of advancing medical education, I consent to the admittance of observers to the Operating Room.     7. I authorize the use of any specimen, organs Signature of Patient:   ___________________________    When the patient is a minor or mentally incompetent to give consent:  Signature of person authorized to consent for patient: ___________________________   Relationship to patient: _____________________ drugs/illegal medications). Failure to inform my anesthesiologist about these medicines may increase my risk of anesthetic complications. · If I am allergic to anything or have had a reaction to anesthesia before.     3. I understand how the anesthesia med I have discussed the procedure and information above with the patient (or patient’s representative) and answered their questions. The patient or their representative has agreed to have anesthesia services.     _______________________________________________

## (undated) NOTE — IP AVS SNAPSHOT
[facility] [place] [address] [place] [address], [address] [place] [address] Rd ~ [phone]                Discharge Summary   1/23/2017    [facility]           Admission Information        Provider Department    1/23/2017 Mark Tia MD  Pacu      S [    ]    [    ]       Clindamycin HCl 300 MG Caps   Commonly known as:  CLEOCIN        Take  by mouth 3 (three) times daily.  Indications: take 2 po prn in case of infection in knee      [    ]    [    ]    [    ]    [    ]       Lurdes Marus 100 MG Tabs   Gen ·  This can prevent pneumonia  2. Ambulate:   · Get up and walk several times a day-not strenuous  · Do Foot pumps when you are sitting  · This can prevent blood clots  3.  Drink Plenty of Water  · Sometimes after surgery; you don't feel like eating normall (01/11/17)  75.8 (01/11/17)  16.8 (01/11/17)  5.0 (01/11/17)  1.8 -- -- (01/11/17)  6.69 (01/11/17)  1.48 (01/11/17)  0.44 (01/11/17)  0.16 (01/11/17)  0.05    (11/16/16)  70.8 (11/16/16)  19.3 (11/16/16)  6.8 (11/16/16)  2.5   (11/16/16)  6.13 (11/16/16) Visit [employer]  You can access your MyChart to more actively manage your health care and view more details from this visit by going to [email or web address].   If you've recently had a stay at the Hospital you can access your discharge instructions i

## (undated) NOTE — LETTER
[facility]  Lamar Valero 61 [address] [facility], [address] [place] [address]    Consent for Operation    Date: __________________    Time: _______________    1.  I authorize the performance upon [employer] the following operation:      revision of left arm arterio-ve videotape. The [facility] will not be responsible for storage or maintenance of this tape. 6. For the purpose of advancing medical education, I consent to the admittance of observers to the Operating Room.     7. I authorize the use of any specimen, organs Signature of Patient:   ___________________________    When the patient is a minor or mentally incompetent to give consent:  Signature of person authorized to consent for patient: ___________________________   Relationship to patient: _____________________ drugs/illegal medications). Failure to inform my anesthesiologist about these medicines may increase my risk of anesthetic complications. · If I am allergic to anything or have had a reaction to anesthesia before.     3. I understand how the anesthesia med I have discussed the procedure and information above with the patient (or patient’s representative) and answered their questions. The patient or their representative has agreed to have anesthesia services.     _______________________________________________

## (undated) NOTE — LETTER
[facility]  [address] [facility], [address] [facility]  Consent for Procedure/Sedation    Date: 9/4/2020    Time: 10:00AM      1.  I authorize the performance upon Marly Johnson the following:cardiac catheterization, left ventricular cineangiograph Signature of person authorized                                     Relationship to  to consent for patient: _________________________ patient: ___________________    Witness: _______________________________ Date: _____________________    Printed: 9/4/202

## (undated) NOTE — IP AVS SNAPSHOT
[facility] [place]  [address] [place], [address] ~ [phone]                Discharge Summary   3/27/2017    [facility]           Admission Information        Provider Department    3/27/2017 Jeevan Carnes MD  Pacu      S Commonly known as:  NORVASC        TAKE ONE TABLET BY MOUTH EVERY DAY    Rossy Bentley     [    ]    [    ]    [    ]    [    ]       Atorvastatin Calcium 20 MG Tabs   Commonly known as:  LIPITOR        TAKE ONE TABLET BY MOUTH EVERY DAY    Rossy Bentley · 24 hours after Anesthesia/Sedation    Call the doctor for:  · Elevated Temperature  · Bleeding  · Nausea/Vomiting  · Pain not relieved with pain medication    For life threatening emergencies (unexpected chest pain, difficulty breathing) call 911.       Audi Sparrow 5.40 (03/27/17)  1.84 (03/27/17)  0.63 (H) (03/27/17)  0.17 (03/27/17)  0.04    (01/11/17)  75.8 (01/11/17)  16.8 (01/11/17)  5.0 (01/11/17)  1.8   (01/11/17)  6.69 (01/11/17)  1.48 (01/11/17)  0.44 (01/11/17)  0.16 (01/11/17)  5.69      Metabolic Lab Resu view more details from this visit by going to [email or web address]. If you've recently had a stay at the Hospital you can access your discharge instructions in [employer] [facility] by going to Visits < Admission Summaries.  If you've been to the Emergency Depar

## (undated) NOTE — LETTER
Shelby Brandon M.D., F.A.C.S. Cristopher Pritchard M.D., F.A.C.S. Shima Snow M.D., Shanika So. DAVID Powers M.D., F.A.C.S. Bhavesh Cabello. Lydia White M.D., F.A.C.S. JOCELYNE Smith M. Corrin Ax A.D. Travis Spare, M.D., F. chance to have all of your questions and concerns answered. If there are any issues which have not been adequately addressed, we ask you to bring them forward so that we can thoroughly address them.     A patient who is fully informed and understands their treatment, among other options and the risks and benefits of the different treatment options:    Yes _____ No _____    A [facility] surgeon as explained to me that if I should so desire, he/she is willing to explain my case and the surgical and non-surgical optio

## (undated) NOTE — LETTER
[address] [facility] FOR BLOOD OR BLOOD COMPONENTS      In the course of your treatment, it may become necessary to administer a transfusion of blood or blood components.  This form provides basic information concerning this proc explain the alternatives to you if it has not already been done. I,Edmund Swartz, have read/had read to me the above. I understand the matters bearing on the decision whether or not to authorize a transfusion of blood or blood components.  I have no qu

## (undated) NOTE — LETTER
[address] [facility] FOR BLOOD OR BLOOD COMPONENTS      In the course of your treatment, it may become necessary to administer a transfusion of blood or blood components.  This form provides basic information concerning this proc If loss of blood poses serious threats in the course of your treatment, THERE IS NO EFFECTIVE ALTERNATIVE TO BLOOD TRANSFUSION.  However, if you have any further questions on this matter, your physician will fully explain the alternatives to you if it has n

## (undated) NOTE — LETTER
[facility] [address], [address]  Consent for Procedure/Sedation    Date:     Time:       1.  I authorize the performance upon Román Whitley the following:  LEFT ARM GRAFT FISTULOGRAM , ANGIOPLASTY, POSSIBLE THROMBOLYTICS ________________________________    ___________________    Witness: _________________________      Date: ___________________    Printed: 2018   2:38 PM  Patient Name: Mardel Market        : 1935       Medical Record #: [id number]

## (undated) NOTE — LETTER
[facility] [address], [address]  Consent for Procedure/Sedation    Date:     Time:       1. I authorize the performance upon [employer] the following:  LEFT ARM FISTULOGRAM, ANGIOPLASTY, POSSIBLE THROMBOLYSIS     2.  I aut ________________________________    ___________________    Witness: _________________________      Date: ___________________    Printed: 2018   2:45 PM  Patient Name: Elisa Fisher        : 1935       Medical Record #: [id number]

## (undated) NOTE — LETTER
Pepper Roe [address]  [address] [facility] [address], [address] [facility]  Authorization for Surgical Operation and Procedure     Date:_9/8/2020__________                                                                                                         Time:__06 potential risks that can occur: fever and allergic reactions, hemolytic reactions, transmission of diseases such as Hepatitis, AIDS and Cytomegalovirus (CMV) and fluid overload.   In the event that I wish to have an autologous transfusion of my own blood, o attending physician will determine when the applicable recovery period ends for purposes of reinstating the DNAR order.   10. Patients having a sterilization procedure: I understand that if the procedure is successful the results will be permanent and it wi a. Allow the anesthesiologist (anesthesia doctor) to give me medicine and do additional procedures as necessary.  Some examples are: Starting or using an “IV” to give me medicine, fluids or blood during my procedure, and having a breathing tube placed to he 7. Regional Anesthesia (“spinal”, “epidural”, & “nerve blocks”): I understand that rare but potential complications include headache, bleeding, infection, seizure, irregular heart rhythms, and nerve injury.     I can change my mind about having anesthesia